# Patient Record
Sex: FEMALE | Race: WHITE | Employment: OTHER | URBAN - METROPOLITAN AREA
[De-identification: names, ages, dates, MRNs, and addresses within clinical notes are randomized per-mention and may not be internally consistent; named-entity substitution may affect disease eponyms.]

---

## 2017-01-09 ENCOUNTER — TELEPHONE (OUTPATIENT)
Dept: INTERNAL MEDICINE CLINIC | Age: 82
End: 2017-01-09

## 2017-01-09 ENCOUNTER — OFFICE VISIT (OUTPATIENT)
Dept: INTERNAL MEDICINE CLINIC | Age: 82
End: 2017-01-09

## 2017-01-09 ENCOUNTER — HOSPITAL ENCOUNTER (OUTPATIENT)
Dept: GENERAL RADIOLOGY | Age: 82
Discharge: HOME | End: 2017-01-09
Attending: FAMILY MEDICINE | Admitting: FAMILY MEDICINE

## 2017-01-09 VITALS
HEART RATE: 83 BPM | SYSTOLIC BLOOD PRESSURE: 112 MMHG | DIASTOLIC BLOOD PRESSURE: 64 MMHG | OXYGEN SATURATION: 96 % | WEIGHT: 154 LBS | BODY MASS INDEX: 24.75 KG/M2 | HEIGHT: 66 IN

## 2017-01-09 DIAGNOSIS — F51.04 PSYCHOPHYSIOLOGICAL INSOMNIA: ICD-10-CM

## 2017-01-09 DIAGNOSIS — E78.2 MIXED HYPERLIPIDEMIA: ICD-10-CM

## 2017-01-09 DIAGNOSIS — R53.83 FATIGUE, UNSPECIFIED TYPE: ICD-10-CM

## 2017-01-09 DIAGNOSIS — Z76.89 ENCOUNTER TO ESTABLISH CARE: ICD-10-CM

## 2017-01-09 DIAGNOSIS — I10 ESSENTIAL HYPERTENSION: ICD-10-CM

## 2017-01-09 DIAGNOSIS — E11.9 TYPE 2 DIABETES MELLITUS WITHOUT COMPLICATION, WITHOUT LONG-TERM CURRENT USE OF INSULIN (HCC): ICD-10-CM

## 2017-01-09 DIAGNOSIS — E11.9 TYPE 2 DIABETES MELLITUS WITHOUT COMPLICATION, WITHOUT LONG-TERM CURRENT USE OF INSULIN (HCC): Primary | ICD-10-CM

## 2017-01-09 DIAGNOSIS — R35.0 URINARY FREQUENCY: ICD-10-CM

## 2017-01-09 LAB
A/G RATIO: 1 (ref 1.1–2.2)
ALBUMIN SERPL-MCNC: 4.1 G/DL (ref 3.4–5)
ALP BLD-CCNC: 111 U/L (ref 40–129)
ALT SERPL-CCNC: 37 U/L (ref 10–40)
ANION GAP SERPL CALCULATED.3IONS-SCNC: 15 MMOL/L (ref 3–16)
AST SERPL-CCNC: 35 U/L (ref 15–37)
BILIRUB SERPL-MCNC: 0.7 MG/DL (ref 0–1)
BILIRUBIN, POC: NORMAL
BLOOD URINE, POC: NORMAL
BUN BLDV-MCNC: 29 MG/DL (ref 7–20)
CALCIUM SERPL-MCNC: 9.6 MG/DL (ref 8.3–10.6)
CHLORIDE BLD-SCNC: 98 MMOL/L (ref 99–110)
CHOLESTEROL, TOTAL: 193 MG/DL (ref 0–199)
CLARITY, POC: CLEAR
CO2: 24 MMOL/L (ref 21–32)
COLOR, POC: YELLOW
CREAT SERPL-MCNC: 1.1 MG/DL (ref 0.6–1.2)
GFR AFRICAN AMERICAN: 58
GFR NON-AFRICAN AMERICAN: 48
GLOBULIN: 4 G/DL
GLUCOSE BLD-MCNC: 164 MG/DL (ref 70–99)
GLUCOSE URINE, POC: NORMAL
HDLC SERPL-MCNC: 51 MG/DL (ref 40–60)
KETONES, POC: NORMAL
LDL CHOLESTEROL CALCULATED: 86 MG/DL
LEUKOCYTE EST, POC: NORMAL
NITRITE, POC: NORMAL
PH, POC: 5
POTASSIUM SERPL-SCNC: 5.1 MMOL/L (ref 3.5–5.1)
PROTEIN, POC: NORMAL
SODIUM BLD-SCNC: 137 MMOL/L (ref 136–145)
SPECIFIC GRAVITY, POC: 1.05
TOTAL PROTEIN: 8.1 G/DL (ref 6.4–8.2)
TRIGL SERPL-MCNC: 282 MG/DL (ref 0–150)
TSH REFLEX: 3.41 UIU/ML (ref 0.27–4.2)
UROBILINOGEN, POC: 0.2
VLDLC SERPL CALC-MCNC: 56 MG/DL

## 2017-01-09 PROCEDURE — 81002 URINALYSIS NONAUTO W/O SCOPE: CPT | Performed by: FAMILY MEDICINE

## 2017-01-09 PROCEDURE — 99215 OFFICE O/P EST HI 40 MIN: CPT | Performed by: FAMILY MEDICINE

## 2017-01-09 RX ORDER — NITROFURANTOIN 25; 75 MG/1; MG/1
100 CAPSULE ORAL 2 TIMES DAILY
Qty: 14 CAPSULE | Refills: 0 | Status: SHIPPED | OUTPATIENT
Start: 2017-01-09 | End: 2017-01-16

## 2017-01-09 RX ORDER — LISINOPRIL 20 MG/1
TABLET ORAL
Qty: 180 TABLET | Refills: 3 | Status: SHIPPED | OUTPATIENT
Start: 2017-01-09 | End: 2018-01-23 | Stop reason: SDUPTHER

## 2017-01-09 RX ORDER — CHOLECALCIFEROL (VITAMIN D3) 125 MCG
5 CAPSULE ORAL NIGHTLY
Qty: 30 TABLET | Refills: 3 | Status: SHIPPED | OUTPATIENT
Start: 2017-01-09 | End: 2017-02-14 | Stop reason: SDUPTHER

## 2017-01-09 ASSESSMENT — PATIENT HEALTH QUESTIONNAIRE - PHQ9
SUM OF ALL RESPONSES TO PHQ9 QUESTIONS 1 & 2: 0
SUM OF ALL RESPONSES TO PHQ QUESTIONS 1-9: 0
2. FEELING DOWN, DEPRESSED OR HOPELESS: 0
1. LITTLE INTEREST OR PLEASURE IN DOING THINGS: 0

## 2017-01-10 LAB
ESTIMATED AVERAGE GLUCOSE: 174.3 MG/DL
HBA1C MFR BLD: 7.7 %

## 2017-01-10 ASSESSMENT — ENCOUNTER SYMPTOMS
NAUSEA: 0
ABDOMINAL PAIN: 0
SHORTNESS OF BREATH: 0
CONSTIPATION: 0
WHEEZING: 0
BACK PAIN: 0
VOMITING: 0
SORE THROAT: 0
COUGH: 0
RHINORRHEA: 0
TROUBLE SWALLOWING: 0
CHOKING: 0
DIARRHEA: 0

## 2017-01-11 LAB — URINE CULTURE, ROUTINE: NORMAL

## 2017-01-12 ENCOUNTER — TELEPHONE (OUTPATIENT)
Dept: INTERNAL MEDICINE CLINIC | Age: 82
End: 2017-01-12

## 2017-01-24 ENCOUNTER — OFFICE VISIT (OUTPATIENT)
Dept: INTERNAL MEDICINE CLINIC | Age: 82
End: 2017-01-24

## 2017-01-24 VITALS
OXYGEN SATURATION: 96 % | TEMPERATURE: 98.1 F | BODY MASS INDEX: 25.18 KG/M2 | DIASTOLIC BLOOD PRESSURE: 68 MMHG | WEIGHT: 156 LBS | SYSTOLIC BLOOD PRESSURE: 162 MMHG | HEART RATE: 86 BPM

## 2017-01-24 DIAGNOSIS — R35.0 FREQUENCY OF URINATION: ICD-10-CM

## 2017-01-24 DIAGNOSIS — N32.81 OAB (OVERACTIVE BLADDER): ICD-10-CM

## 2017-01-24 DIAGNOSIS — I10 ESSENTIAL HYPERTENSION: Primary | ICD-10-CM

## 2017-01-24 LAB
BILIRUBIN, POC: ABNORMAL
BLOOD URINE, POC: ABNORMAL
CLARITY, POC: CLEAR
COLOR, POC: YELLOW
GLUCOSE URINE, POC: ABNORMAL
KETONES, POC: ABNORMAL
LEUKOCYTE EST, POC: ABNORMAL
NITRITE, POC: ABNORMAL
PH, POC: 6
PROTEIN, POC: ABNORMAL
SPECIFIC GRAVITY, POC: 1.02
UROBILINOGEN, POC: 0.2

## 2017-01-24 PROCEDURE — 1090F PRES/ABSN URINE INCON ASSESS: CPT | Performed by: FAMILY MEDICINE

## 2017-01-24 PROCEDURE — G8427 DOCREV CUR MEDS BY ELIG CLIN: HCPCS | Performed by: FAMILY MEDICINE

## 2017-01-24 PROCEDURE — 1036F TOBACCO NON-USER: CPT | Performed by: FAMILY MEDICINE

## 2017-01-24 PROCEDURE — 99213 OFFICE O/P EST LOW 20 MIN: CPT | Performed by: FAMILY MEDICINE

## 2017-01-24 PROCEDURE — 4040F PNEUMOC VAC/ADMIN/RCVD: CPT | Performed by: FAMILY MEDICINE

## 2017-01-24 PROCEDURE — 81002 URINALYSIS NONAUTO W/O SCOPE: CPT | Performed by: FAMILY MEDICINE

## 2017-01-24 PROCEDURE — G8400 PT W/DXA NO RESULTS DOC: HCPCS | Performed by: FAMILY MEDICINE

## 2017-01-24 PROCEDURE — 1123F ACP DISCUSS/DSCN MKR DOCD: CPT | Performed by: FAMILY MEDICINE

## 2017-01-24 PROCEDURE — G8420 CALC BMI NORM PARAMETERS: HCPCS | Performed by: FAMILY MEDICINE

## 2017-01-24 PROCEDURE — G8484 FLU IMMUNIZE NO ADMIN: HCPCS | Performed by: FAMILY MEDICINE

## 2017-01-24 RX ORDER — AMLODIPINE BESYLATE 5 MG/1
5 TABLET ORAL DAILY
Qty: 90 TABLET | Refills: 0 | Status: SHIPPED | OUTPATIENT
Start: 2017-01-24 | End: 2017-04-27 | Stop reason: SDUPTHER

## 2017-01-24 RX ORDER — OXYBUTYNIN CHLORIDE 5 MG/1
TABLET ORAL
Qty: 90 TABLET | Refills: 3 | Status: SHIPPED | OUTPATIENT
Start: 2017-01-24 | End: 2018-01-03 | Stop reason: SDUPTHER

## 2017-01-26 LAB
ORGANISM: ABNORMAL
URINE CULTURE, ROUTINE: ABNORMAL

## 2017-01-26 RX ORDER — CLINDAMYCIN HYDROCHLORIDE 300 MG/1
300 CAPSULE ORAL 3 TIMES DAILY
Qty: 30 CAPSULE | Refills: 0 | Status: SHIPPED | OUTPATIENT
Start: 2017-01-26 | End: 2017-02-05

## 2017-02-14 ENCOUNTER — OFFICE VISIT (OUTPATIENT)
Dept: INTERNAL MEDICINE CLINIC | Age: 82
End: 2017-02-14

## 2017-02-14 VITALS
DIASTOLIC BLOOD PRESSURE: 72 MMHG | WEIGHT: 159 LBS | OXYGEN SATURATION: 98 % | SYSTOLIC BLOOD PRESSURE: 128 MMHG | HEART RATE: 92 BPM | BODY MASS INDEX: 25.66 KG/M2

## 2017-02-14 DIAGNOSIS — F51.04 PSYCHOPHYSIOLOGICAL INSOMNIA: ICD-10-CM

## 2017-02-14 DIAGNOSIS — I10 ESSENTIAL HYPERTENSION: Primary | ICD-10-CM

## 2017-02-14 DIAGNOSIS — R35.0 URINARY FREQUENCY: ICD-10-CM

## 2017-02-14 DIAGNOSIS — K52.1 ANTIBIOTIC-ASSOCIATED DIARRHEA: ICD-10-CM

## 2017-02-14 DIAGNOSIS — E11.9 TYPE 2 DIABETES MELLITUS WITHOUT COMPLICATION, WITHOUT LONG-TERM CURRENT USE OF INSULIN (HCC): ICD-10-CM

## 2017-02-14 DIAGNOSIS — T36.95XA ANTIBIOTIC-ASSOCIATED DIARRHEA: ICD-10-CM

## 2017-02-14 PROCEDURE — 1123F ACP DISCUSS/DSCN MKR DOCD: CPT | Performed by: FAMILY MEDICINE

## 2017-02-14 PROCEDURE — G8400 PT W/DXA NO RESULTS DOC: HCPCS | Performed by: FAMILY MEDICINE

## 2017-02-14 PROCEDURE — 1090F PRES/ABSN URINE INCON ASSESS: CPT | Performed by: FAMILY MEDICINE

## 2017-02-14 PROCEDURE — G8484 FLU IMMUNIZE NO ADMIN: HCPCS | Performed by: FAMILY MEDICINE

## 2017-02-14 PROCEDURE — 4040F PNEUMOC VAC/ADMIN/RCVD: CPT | Performed by: FAMILY MEDICINE

## 2017-02-14 PROCEDURE — G8427 DOCREV CUR MEDS BY ELIG CLIN: HCPCS | Performed by: FAMILY MEDICINE

## 2017-02-14 PROCEDURE — G8420 CALC BMI NORM PARAMETERS: HCPCS | Performed by: FAMILY MEDICINE

## 2017-02-14 PROCEDURE — 99214 OFFICE O/P EST MOD 30 MIN: CPT | Performed by: FAMILY MEDICINE

## 2017-02-14 PROCEDURE — 1036F TOBACCO NON-USER: CPT | Performed by: FAMILY MEDICINE

## 2017-02-14 RX ORDER — CHOLECALCIFEROL (VITAMIN D3) 125 MCG
10 CAPSULE ORAL NIGHTLY
Qty: 60 TABLET | Refills: 5 | Status: SHIPPED | OUTPATIENT
Start: 2017-02-14 | End: 2018-02-28

## 2017-02-16 ENCOUNTER — OFFICE VISIT (OUTPATIENT)
Dept: INTERNAL MEDICINE CLINIC | Age: 82
End: 2017-02-16

## 2017-02-16 DIAGNOSIS — R35.0 URINARY FREQUENCY: Primary | ICD-10-CM

## 2017-02-16 LAB
BILIRUBIN, POC: NORMAL
BLOOD URINE, POC: NORMAL
CLARITY, POC: CLEAR
COLOR, POC: YELLOW
GLUCOSE URINE, POC: NORMAL
KETONES, POC: NORMAL
LEUKOCYTE EST, POC: NORMAL
NITRITE, POC: NORMAL
PH, POC: 5
PROTEIN, POC: NORMAL
SPECIFIC GRAVITY, POC: 1.01
UROBILINOGEN, POC: 0.2

## 2017-02-16 PROCEDURE — 81002 URINALYSIS NONAUTO W/O SCOPE: CPT | Performed by: FAMILY MEDICINE

## 2017-02-16 PROCEDURE — 1036F TOBACCO NON-USER: CPT | Performed by: FAMILY MEDICINE

## 2017-02-16 PROCEDURE — 99999 PR OFFICE/OUTPT VISIT,PROCEDURE ONLY: CPT | Performed by: FAMILY MEDICINE

## 2017-02-18 LAB — URINE CULTURE, ROUTINE: NORMAL

## 2017-02-26 ASSESSMENT — ENCOUNTER SYMPTOMS
DIARRHEA: 1
BACK PAIN: 0
SHORTNESS OF BREATH: 0
SORE THROAT: 0
CONSTIPATION: 0
TROUBLE SWALLOWING: 0
RHINORRHEA: 0
CHOKING: 0
ABDOMINAL PAIN: 0
WHEEZING: 0
BLOOD IN STOOL: 0
NAUSEA: 0
VOMITING: 0
COUGH: 0

## 2017-03-09 ENCOUNTER — OFFICE VISIT (OUTPATIENT)
Dept: INTERNAL MEDICINE CLINIC | Age: 82
End: 2017-03-09

## 2017-03-09 VITALS
DIASTOLIC BLOOD PRESSURE: 68 MMHG | HEART RATE: 103 BPM | WEIGHT: 156 LBS | SYSTOLIC BLOOD PRESSURE: 110 MMHG | OXYGEN SATURATION: 96 % | BODY MASS INDEX: 25.18 KG/M2

## 2017-03-09 DIAGNOSIS — E11.9 TYPE 2 DIABETES MELLITUS WITHOUT COMPLICATION, WITHOUT LONG-TERM CURRENT USE OF INSULIN (HCC): ICD-10-CM

## 2017-03-09 DIAGNOSIS — G47.9 SLEEPING DIFFICULTIES: Primary | ICD-10-CM

## 2017-03-09 DIAGNOSIS — R06.83 SNORING: ICD-10-CM

## 2017-03-09 PROCEDURE — G8420 CALC BMI NORM PARAMETERS: HCPCS | Performed by: FAMILY MEDICINE

## 2017-03-09 PROCEDURE — G8427 DOCREV CUR MEDS BY ELIG CLIN: HCPCS | Performed by: FAMILY MEDICINE

## 2017-03-09 PROCEDURE — 1036F TOBACCO NON-USER: CPT | Performed by: FAMILY MEDICINE

## 2017-03-09 PROCEDURE — 4040F PNEUMOC VAC/ADMIN/RCVD: CPT | Performed by: FAMILY MEDICINE

## 2017-03-09 PROCEDURE — 1123F ACP DISCUSS/DSCN MKR DOCD: CPT | Performed by: FAMILY MEDICINE

## 2017-03-09 PROCEDURE — G8400 PT W/DXA NO RESULTS DOC: HCPCS | Performed by: FAMILY MEDICINE

## 2017-03-09 PROCEDURE — 1090F PRES/ABSN URINE INCON ASSESS: CPT | Performed by: FAMILY MEDICINE

## 2017-03-09 PROCEDURE — 99214 OFFICE O/P EST MOD 30 MIN: CPT | Performed by: FAMILY MEDICINE

## 2017-03-09 PROCEDURE — G8484 FLU IMMUNIZE NO ADMIN: HCPCS | Performed by: FAMILY MEDICINE

## 2017-04-17 ENCOUNTER — HOSPITAL ENCOUNTER (OUTPATIENT)
Dept: GENERAL RADIOLOGY | Age: 82
Discharge: HOME | End: 2017-04-17
Attending: FAMILY MEDICINE | Admitting: FAMILY MEDICINE

## 2017-04-17 ENCOUNTER — OFFICE VISIT (OUTPATIENT)
Dept: INTERNAL MEDICINE CLINIC | Age: 82
End: 2017-04-17

## 2017-04-17 VITALS
WEIGHT: 156 LBS | DIASTOLIC BLOOD PRESSURE: 56 MMHG | HEART RATE: 88 BPM | SYSTOLIC BLOOD PRESSURE: 118 MMHG | BODY MASS INDEX: 25.18 KG/M2 | OXYGEN SATURATION: 98 %

## 2017-04-17 DIAGNOSIS — E78.2 MIXED HYPERLIPIDEMIA: ICD-10-CM

## 2017-04-17 DIAGNOSIS — G47.9 SLEEPING DIFFICULTIES: ICD-10-CM

## 2017-04-17 DIAGNOSIS — I10 ESSENTIAL HYPERTENSION: ICD-10-CM

## 2017-04-17 DIAGNOSIS — E11.9 TYPE 2 DIABETES MELLITUS WITHOUT COMPLICATION, WITHOUT LONG-TERM CURRENT USE OF INSULIN (HCC): Primary | ICD-10-CM

## 2017-04-17 DIAGNOSIS — J30.2 SEASONAL ALLERGIC RHINITIS, UNSPECIFIED ALLERGIC RHINITIS TRIGGER: ICD-10-CM

## 2017-04-17 DIAGNOSIS — R01.1 HEART MURMUR, SYSTOLIC: ICD-10-CM

## 2017-04-17 DIAGNOSIS — E11.9 TYPE 2 DIABETES MELLITUS WITHOUT COMPLICATION, WITHOUT LONG-TERM CURRENT USE OF INSULIN (HCC): ICD-10-CM

## 2017-04-17 DIAGNOSIS — R68.89 DECREASED EXERCISE TOLERANCE: ICD-10-CM

## 2017-04-17 LAB
A/G RATIO: 1.1 (ref 1.1–2.2)
ALBUMIN SERPL-MCNC: 3.9 G/DL (ref 3.4–5)
ALP BLD-CCNC: 96 U/L (ref 40–129)
ALT SERPL-CCNC: 36 U/L (ref 10–40)
ANION GAP SERPL CALCULATED.3IONS-SCNC: 19 MMOL/L (ref 3–16)
AST SERPL-CCNC: 34 U/L (ref 15–37)
BILIRUB SERPL-MCNC: 0.7 MG/DL (ref 0–1)
BUN BLDV-MCNC: 21 MG/DL (ref 7–20)
CALCIUM SERPL-MCNC: 9.4 MG/DL (ref 8.3–10.6)
CHLORIDE BLD-SCNC: 103 MMOL/L (ref 99–110)
CO2: 22 MMOL/L (ref 21–32)
CREAT SERPL-MCNC: 0.9 MG/DL (ref 0.6–1.2)
GFR AFRICAN AMERICAN: >60
GFR NON-AFRICAN AMERICAN: >60
GLOBULIN: 3.6 G/DL
GLUCOSE BLD-MCNC: 246 MG/DL (ref 70–99)
POTASSIUM SERPL-SCNC: 4.8 MMOL/L (ref 3.5–5.1)
SODIUM BLD-SCNC: 144 MMOL/L (ref 136–145)
TOTAL PROTEIN: 7.5 G/DL (ref 6.4–8.2)

## 2017-04-17 PROCEDURE — G8427 DOCREV CUR MEDS BY ELIG CLIN: HCPCS | Performed by: FAMILY MEDICINE

## 2017-04-17 PROCEDURE — 1036F TOBACCO NON-USER: CPT | Performed by: FAMILY MEDICINE

## 2017-04-17 PROCEDURE — G8400 PT W/DXA NO RESULTS DOC: HCPCS | Performed by: FAMILY MEDICINE

## 2017-04-17 PROCEDURE — 4040F PNEUMOC VAC/ADMIN/RCVD: CPT | Performed by: FAMILY MEDICINE

## 2017-04-17 PROCEDURE — G8420 CALC BMI NORM PARAMETERS: HCPCS | Performed by: FAMILY MEDICINE

## 2017-04-17 PROCEDURE — 99214 OFFICE O/P EST MOD 30 MIN: CPT | Performed by: FAMILY MEDICINE

## 2017-04-17 PROCEDURE — 1090F PRES/ABSN URINE INCON ASSESS: CPT | Performed by: FAMILY MEDICINE

## 2017-04-17 PROCEDURE — 1123F ACP DISCUSS/DSCN MKR DOCD: CPT | Performed by: FAMILY MEDICINE

## 2017-04-17 RX ORDER — LORATADINE 10 MG/1
10 TABLET ORAL DAILY
Qty: 30 TABLET | Refills: 1 | Status: SHIPPED | OUTPATIENT
Start: 2017-04-17 | End: 2017-06-15

## 2017-04-18 LAB
ESTIMATED AVERAGE GLUCOSE: 191.5 MG/DL
HBA1C MFR BLD: 8.3 %

## 2017-04-18 ASSESSMENT — ENCOUNTER SYMPTOMS
NAUSEA: 0
BACK PAIN: 0
CONSTIPATION: 0
TROUBLE SWALLOWING: 0
VOMITING: 0
WHEEZING: 0
CHOKING: 0
DIARRHEA: 0
RHINORRHEA: 0
SORE THROAT: 0
ABDOMINAL PAIN: 0
COUGH: 0
SHORTNESS OF BREATH: 0

## 2017-04-19 ENCOUNTER — TELEPHONE (OUTPATIENT)
Dept: INTERNAL MEDICINE CLINIC | Age: 82
End: 2017-04-19

## 2017-04-26 ENCOUNTER — TELEPHONE (OUTPATIENT)
Dept: INTERNAL MEDICINE CLINIC | Age: 82
End: 2017-04-26

## 2017-04-27 RX ORDER — GLIMEPIRIDE 4 MG/1
TABLET ORAL
Qty: 90 TABLET | Refills: 3 | Status: SHIPPED | OUTPATIENT
Start: 2017-04-27 | End: 2018-05-15 | Stop reason: SDUPTHER

## 2017-05-01 ENCOUNTER — HOSPITAL ENCOUNTER (OUTPATIENT)
Dept: NON INVASIVE DIAGNOSTICS | Age: 82
Discharge: HOME | End: 2017-05-01
Attending: FAMILY MEDICINE | Admitting: FAMILY MEDICINE

## 2017-05-01 DIAGNOSIS — R01.1 CARDIAC MURMUR: ICD-10-CM

## 2017-05-01 LAB
LV EF: 55 %
LVEF MODALITY: NORMAL

## 2017-06-15 ENCOUNTER — OFFICE VISIT (OUTPATIENT)
Dept: INTERNAL MEDICINE CLINIC | Age: 82
End: 2017-06-15

## 2017-06-15 VITALS
DIASTOLIC BLOOD PRESSURE: 64 MMHG | WEIGHT: 156 LBS | SYSTOLIC BLOOD PRESSURE: 150 MMHG | HEART RATE: 67 BPM | BODY MASS INDEX: 25.18 KG/M2 | OXYGEN SATURATION: 97 %

## 2017-06-15 DIAGNOSIS — G47.9 SLEEPING DIFFICULTIES: ICD-10-CM

## 2017-06-15 DIAGNOSIS — E11.9 TYPE 2 DIABETES MELLITUS WITHOUT COMPLICATION, WITHOUT LONG-TERM CURRENT USE OF INSULIN (HCC): Primary | ICD-10-CM

## 2017-06-15 DIAGNOSIS — E78.2 MIXED HYPERLIPIDEMIA: ICD-10-CM

## 2017-06-15 DIAGNOSIS — I10 ESSENTIAL HYPERTENSION: ICD-10-CM

## 2017-06-15 LAB — HBA1C MFR BLD: 8.6 %

## 2017-06-15 PROCEDURE — G8400 PT W/DXA NO RESULTS DOC: HCPCS | Performed by: FAMILY MEDICINE

## 2017-06-15 PROCEDURE — 4040F PNEUMOC VAC/ADMIN/RCVD: CPT | Performed by: FAMILY MEDICINE

## 2017-06-15 PROCEDURE — 1123F ACP DISCUSS/DSCN MKR DOCD: CPT | Performed by: FAMILY MEDICINE

## 2017-06-15 PROCEDURE — 83036 HEMOGLOBIN GLYCOSYLATED A1C: CPT | Performed by: FAMILY MEDICINE

## 2017-06-15 PROCEDURE — 99214 OFFICE O/P EST MOD 30 MIN: CPT | Performed by: FAMILY MEDICINE

## 2017-06-15 PROCEDURE — 1090F PRES/ABSN URINE INCON ASSESS: CPT | Performed by: FAMILY MEDICINE

## 2017-06-15 PROCEDURE — 1036F TOBACCO NON-USER: CPT | Performed by: FAMILY MEDICINE

## 2017-06-15 PROCEDURE — G8419 CALC BMI OUT NRM PARAM NOF/U: HCPCS | Performed by: FAMILY MEDICINE

## 2017-06-15 PROCEDURE — G8427 DOCREV CUR MEDS BY ELIG CLIN: HCPCS | Performed by: FAMILY MEDICINE

## 2017-06-15 ASSESSMENT — ENCOUNTER SYMPTOMS
WHEEZING: 0
VOMITING: 0
BACK PAIN: 0
NAUSEA: 0
CONSTIPATION: 0
CHOKING: 0
TROUBLE SWALLOWING: 0
SORE THROAT: 0
DIARRHEA: 0
ABDOMINAL PAIN: 0
RHINORRHEA: 0
COUGH: 0

## 2017-06-20 ENCOUNTER — TELEPHONE (OUTPATIENT)
Dept: INTERNAL MEDICINE CLINIC | Age: 82
End: 2017-06-20

## 2017-06-26 ENCOUNTER — TELEPHONE (OUTPATIENT)
Dept: INTERNAL MEDICINE CLINIC | Age: 82
End: 2017-06-26

## 2017-06-26 RX ORDER — LANCETS
1 EACH MISCELLANEOUS DAILY
Qty: 100 EACH | Refills: 3 | Status: ON HOLD | OUTPATIENT
Start: 2017-06-26 | End: 2019-08-24 | Stop reason: HOSPADM

## 2017-07-18 ENCOUNTER — TELEPHONE (OUTPATIENT)
Dept: INTERNAL MEDICINE CLINIC | Age: 82
End: 2017-07-18

## 2017-07-26 ENCOUNTER — OFFICE VISIT (OUTPATIENT)
Dept: INTERNAL MEDICINE CLINIC | Age: 82
End: 2017-07-26

## 2017-07-26 VITALS
TEMPERATURE: 98.2 F | BODY MASS INDEX: 25.02 KG/M2 | SYSTOLIC BLOOD PRESSURE: 136 MMHG | HEART RATE: 88 BPM | OXYGEN SATURATION: 97 % | WEIGHT: 155 LBS | DIASTOLIC BLOOD PRESSURE: 60 MMHG

## 2017-07-26 DIAGNOSIS — J30.2 SEASONAL ALLERGIC RHINITIS, UNSPECIFIED ALLERGIC RHINITIS TRIGGER: Primary | ICD-10-CM

## 2017-07-26 DIAGNOSIS — M19.90 ARTHRITIS: ICD-10-CM

## 2017-07-26 DIAGNOSIS — R05.9 COUGH: ICD-10-CM

## 2017-07-26 PROCEDURE — 4040F PNEUMOC VAC/ADMIN/RCVD: CPT | Performed by: FAMILY MEDICINE

## 2017-07-26 PROCEDURE — 1090F PRES/ABSN URINE INCON ASSESS: CPT | Performed by: FAMILY MEDICINE

## 2017-07-26 PROCEDURE — 1123F ACP DISCUSS/DSCN MKR DOCD: CPT | Performed by: FAMILY MEDICINE

## 2017-07-26 PROCEDURE — G8419 CALC BMI OUT NRM PARAM NOF/U: HCPCS | Performed by: FAMILY MEDICINE

## 2017-07-26 PROCEDURE — 1036F TOBACCO NON-USER: CPT | Performed by: FAMILY MEDICINE

## 2017-07-26 PROCEDURE — G8400 PT W/DXA NO RESULTS DOC: HCPCS | Performed by: FAMILY MEDICINE

## 2017-07-26 PROCEDURE — G8427 DOCREV CUR MEDS BY ELIG CLIN: HCPCS | Performed by: FAMILY MEDICINE

## 2017-07-26 PROCEDURE — 99214 OFFICE O/P EST MOD 30 MIN: CPT | Performed by: FAMILY MEDICINE

## 2017-07-26 RX ORDER — LORATADINE 10 MG/1
10 CAPSULE, LIQUID FILLED ORAL DAILY
Qty: 30 CAPSULE | Refills: 2 | Status: SHIPPED | OUTPATIENT
Start: 2017-07-26 | End: 2018-02-28

## 2017-07-26 RX ORDER — BENZONATATE 100 MG/1
100-200 CAPSULE ORAL 3 TIMES DAILY PRN
Qty: 25 CAPSULE | Refills: 0 | Status: SHIPPED | OUTPATIENT
Start: 2017-07-26 | End: 2017-08-02

## 2017-07-26 ASSESSMENT — ENCOUNTER SYMPTOMS
CHEST TIGHTNESS: 0
RHINORRHEA: 1
VOMITING: 0
NAUSEA: 0
EYE DISCHARGE: 0
DIARRHEA: 0
COUGH: 1
SINUS PRESSURE: 0
SORE THROAT: 0

## 2017-07-28 ENCOUNTER — TELEPHONE (OUTPATIENT)
Dept: INTERNAL MEDICINE CLINIC | Age: 82
End: 2017-07-28

## 2017-09-18 ENCOUNTER — TELEPHONE (OUTPATIENT)
Dept: INTERNAL MEDICINE CLINIC | Age: 82
End: 2017-09-18

## 2017-09-18 RX ORDER — ATORVASTATIN CALCIUM 40 MG/1
40 TABLET, FILM COATED ORAL DAILY
Qty: 90 TABLET | Refills: 3 | Status: SHIPPED | OUTPATIENT
Start: 2017-09-18 | End: 2018-11-11 | Stop reason: SDUPTHER

## 2017-10-04 ENCOUNTER — OFFICE VISIT (OUTPATIENT)
Dept: ORTHOPEDIC SURGERY | Age: 82
End: 2017-10-04

## 2017-10-04 VITALS
WEIGHT: 156 LBS | DIASTOLIC BLOOD PRESSURE: 76 MMHG | SYSTOLIC BLOOD PRESSURE: 129 MMHG | BODY MASS INDEX: 25.07 KG/M2 | HEART RATE: 62 BPM | HEIGHT: 66 IN

## 2017-10-04 DIAGNOSIS — M25.511 RIGHT SHOULDER PAIN, UNSPECIFIED CHRONICITY: Primary | ICD-10-CM

## 2017-10-04 PROCEDURE — 99203 OFFICE O/P NEW LOW 30 MIN: CPT | Performed by: ORTHOPAEDIC SURGERY

## 2017-10-04 PROCEDURE — 20610 DRAIN/INJ JOINT/BURSA W/O US: CPT | Performed by: ORTHOPAEDIC SURGERY

## 2017-10-04 PROCEDURE — G8484 FLU IMMUNIZE NO ADMIN: HCPCS | Performed by: ORTHOPAEDIC SURGERY

## 2017-10-04 PROCEDURE — G8417 CALC BMI ABV UP PARAM F/U: HCPCS | Performed by: ORTHOPAEDIC SURGERY

## 2017-10-04 PROCEDURE — G8400 PT W/DXA NO RESULTS DOC: HCPCS | Performed by: ORTHOPAEDIC SURGERY

## 2017-10-04 PROCEDURE — G8427 DOCREV CUR MEDS BY ELIG CLIN: HCPCS | Performed by: ORTHOPAEDIC SURGERY

## 2017-10-04 PROCEDURE — 73030 X-RAY EXAM OF SHOULDER: CPT | Performed by: ORTHOPAEDIC SURGERY

## 2017-10-04 PROCEDURE — 1123F ACP DISCUSS/DSCN MKR DOCD: CPT | Performed by: ORTHOPAEDIC SURGERY

## 2017-10-04 PROCEDURE — 1090F PRES/ABSN URINE INCON ASSESS: CPT | Performed by: ORTHOPAEDIC SURGERY

## 2017-10-04 PROCEDURE — 1036F TOBACCO NON-USER: CPT | Performed by: ORTHOPAEDIC SURGERY

## 2017-10-04 PROCEDURE — 4040F PNEUMOC VAC/ADMIN/RCVD: CPT | Performed by: ORTHOPAEDIC SURGERY

## 2017-10-04 RX ORDER — MELOXICAM 15 MG/1
15 TABLET ORAL DAILY
Qty: 30 TABLET | Refills: 3 | Status: SHIPPED | OUTPATIENT
Start: 2017-10-04 | End: 2018-02-28

## 2017-10-04 NOTE — PROGRESS NOTES
Chief Complaint:  Shoulder Pain (RIGHT)      History of Present Illness:  Delisa Briceno is a [de-identified] y.o. female here regarding right shoulder pain. She also has bilateral shoulder pain and left knee pain. But it'll her right shoulder is worse. She has a family history of relatively severe degenerative osteoarthritis. Currently not undergoing any arthritic treatment or therapy. Today she presents with right shoulder pain. He has trouble with range of motion feels that she has limited range of motion as well as weakness. The pain is located diffusely throughout the shoulder. She describes the symptoms as aching and sharp. Symptoms improve with rest. She has trouble sleeping at night on the right shoulder. Medical History:  Patient's medications, allergies, past medical, surgical, social and family histories were reviewed and updated as appropriate. Review of Systems:  Pertinent items are noted in HPI  Review of systems reviewed from Patient History Form dated on 10/4/17 and available in the patient's chart under the Media tab. Vital Signs:  Vitals:    10/04/17 1331   BP: 129/76   Pulse: 62       Pain Assessment:  Pain Assessment  Location of Pain: Shoulder  Location Modifiers: Right  Severity of Pain: 1  Quality of Pain: Dull, Aching  Frequency of Pain: Intermittent  Limiting Behavior: No  Relieving Factors: Nsaids  Result of Injury: No  Work-Related Injury: No  Are there other pain locations you wish to document?: No         General Exam:   Constitutional: Patient is adequately groomed with no evidence of malnutrition  Mental Status: The patient is oriented to time, place and person. The patient's mood and affect are appropriate. Vascular: Examination reveals no swelling or calf tenderness. Peripheral pulses are palpable and 2+. Neurological: The patient has good coordination. There is no weakness or sensory deficit. Shoulder Examination:  Inspection:  No gross deformities noted.  No atrophy, erythema or ecchymosis. Skin is intact. Palpation:  She has some mild tenderness to palpation around the right shoulder girdle but not well localized. Range of Motion:  Her range of motion both active and passive are limited secondary to her degenerative changes. Flexion is about 120° her abduction to about 70  Ro 40 internal rotates to her hip pocket. Strength:  She has good rotator cuff strength no evidence of a rotator cuff deficit. Special Tests:  Her shoulder is stable    Skin: There are no rashes, ulcerations or lesions. Additional Comments: Sensation is intact to light touch throughout the median, ulnar and radial nerve distribution. Able to wiggle fingers, give thumbs up, A-OK and cross index and middle fingers. Additional Examinations:  Left Upper Extremity: Examination of the left upper extremity does not show any tenderness, deformity or injury. Range of motion is unremarkable. There is no gross instability. There are no rashes, ulcerations or lesions. Strength and tone are normal.    XR SHOULDER RIGHT STANDARD  NE ARTHROCENTESIS ASPIR&/INJ MAJOR JT/BURSA W/O US  NE METHYLPREDNISOLONE 40 MG INJ  Diagnostic Test Findings:    4 views of the right shoulder show moderately severe degenerative osteoarthritis of the right glenohumeral joint      Assessment:  Right shoulder arthritis    Impression:  Encounter Diagnosis   Name Primary?  Right shoulder pain, unspecified chronicity Yes       Office Procedures:  Orders Placed This Encounter   Procedures    XR SHOULDER RIGHT (MIN 2 VIEWS)     97788     Order Specific Question:   Reason for exam:     Answer:   Pain    NE ARTHROCENTESIS ASPIR&/INJ MAJOR JT/BURSA W/O US    NE METHYLPREDNISOLONE 40 MG INJ       Treatment Plan:  She will be placed on meloxicam 15 mg a day for at least the next 2 months. She is instructed to take that with food contact us if she has any problems.   Today we also injected her glenohumeral joint with Depo-Medrol and Marcaine which she tolerated well. She'll follow up as needed. The risks and benefits of an injection were discussed with the patient. The patient had full opportunity to ask questions and all were answered. The patient then provided verbal informed consent. The skin was then prepped with betadine solution and alcohol. Under aseptic conditions, the  right glenohumeral joint was injected with 2cc of 1% xylocaine, then a mixture of 3cc of 0.25% marcaine and 2cc (80 mg) of Depomedrol. There were no immediate complications following the injection. The patient was advised of the possibility of injection site reaction and instructed to apply ice to the area. This dictation was performed with a verbal recognition program (DRAGON) and it was checked for errors. It is possible that there are still dictated errors within this office note. If so, please bring any errors to my attention for an addendum. All efforts were made to ensure that this office note is accurate.

## 2017-10-04 NOTE — MR AVS SNAPSHOT
After Visit Summary             Gala Mckinley   10/4/2017 1:30 PM   Office Visit    Description:  Female : 1935   Provider:  Perri Bocanegra MD   Department:  [address] [place] [address] Drive and Future Appointments         Below is a list of your follow-up and future appointments. This may not be a complete list as you may have made appointments directly with providers that we are not aware of or your providers may have made some for you. Please call your providers to confirm appointments. It is important to keep your appointments. Please bring your current insurance card, photo ID, co-pay, and all medication bottles to your appointment. If self-pay, payment is expected at the time of service. Your To-Do List     Future Appointments Provider Department Dept Phone    10/9/2017 2:00 PM Raissa Mondragon MD [facility] [phone]    Please arrive 15 minutes prior to appointment, bring photo ID and insurance card. Follow-Up    Return if symptoms worsen or fail to improve. Information from Your Visit        Department     Name Address Phone Fax    [facility] [address] [phone]      You Were Seen for:         Comments    Right shoulder pain, unspecified chronicity   [[id number]]         Vital Signs     Blood Pressure Pulse Height Weight Body Mass Index Smoking Status    129/76 62 5' 6\" (1.676 m) 156 lb (70.8 kg) 25.18 kg/m2 Never Smoker      Additional Information about your Body Mass Index (BMI)           Your BMI as listed above is considered overweight (25.0-29.9). BMI is an estimate of body fat, calculated from your height and weight. The higher your BMI, the greater your risk of heart disease, high blood pressure, type 2 diabetes, stroke, gallstones, arthritis, sleep apnea, and certain cancers. BMI is not perfect.   It may overestimate body fat in athletes and [employer] LANCETS ULTRATHIN 30G MISC USE TO TEST BLOOD SUGAR LEVELS ONCE DAILY  Dx E11.9    Glucosamine-Chondroitin (MOVE FREE PO) Take  by mouth. ibuprofen (ADVIL) 200 MG tablet Take 200 mg by mouth every 6 hours as needed. Allergies              Metformin And Related Other (See Comments)    diarrhea     Darvocet [Propoxyphene N-acetaminophen] Nausea And Vomiting      We Ordered/Performed the following           IL ARTHROCENTESIS ASPIR&/INJ MAJOR JT/BURSA W/O US     IL METHYLPREDNISOLONE 40 MG INJ     XR SHOULDER RIGHT (MIN 2 VIEWS)     Comments:    650[address]         Result Summary for XR SHOULDER RIGHT (MIN 2 VIEWS)      Result Information     Status          Final result (Exam End: 10/4/2017  1:31 PM)           10/4/2017  1:31 PM      Narrative & Impression           Radiology result is complete; follow up with provider / physician office for radiology results                       Additional Information        Basic Information     Date Of Birth Sex Race Ethnicity Preferred Language    1935 Female White Non-/Non  English      Problem List as of 10/4/2017  Date Reviewed: 10/4/2017                Status post hip replacement    Essential hypertension    Type 2 diabetes mellitus without complication ([facility] [address]    Mixed hyperlipidemia    Ganglion cyst    Scoliosis    Elevated LFTs    Osteoarthritis      Your Goals as of 10/4/2017 at 2:45 PM              10/4/17    7/26/17    6/15/17       Blood Pressure    Blood Pressure < 140/90   129/76  136/60  150/64    Notes    Patient Self-Management Goal for Chronic Condition  Goal: I will take all medications as prescribed by my doctor, and I will call the office if I am having any medication problems.   Barriers to success: none  Plan for overcoming my barriers: N/A     Confidence: 10/10  Date goal set:4/17/17  Date goal attained:         Immunizations as of 10/4/2017     Name Date    Influenza Virus Vaccine 10/2/2013, 9/24/2012, 11/29/2011, 10/27/2010, 9/16/2009    Influenza, High Dose 10/18/2016, 10/24/2014    Pneumococcal 13-valent Conjugate ([id number]) 2/1/2016    Pneumococcal Polysaccharide ([id number]) 8/8/2008, 4/30/2003    Td 7/11/2006      Preventive Care        Date Due    Tetanus Combination Vaccine (1 - Tdap) 7/12/2006    Yearly Flu Vaccine (1) 9/1/2017    Hemoglobin A1C (Test For Long-Term Glucose Control) 12/15/2017    Cholesterol Screening 1/9/2018    Eye Exam By An Eye Doctor 1/12/2018    Diabetic Foot Exam 4/18/2018            MyChart Signup           Our records indicate that you have declined MyChart signup.

## 2017-10-05 ENCOUNTER — TELEPHONE (OUTPATIENT)
Dept: ORTHOPEDIC SURGERY | Age: 82
End: 2017-10-05

## 2017-10-09 ENCOUNTER — OFFICE VISIT (OUTPATIENT)
Dept: INTERNAL MEDICINE CLINIC | Age: 82
End: 2017-10-09

## 2017-10-09 VITALS
HEART RATE: 95 BPM | BODY MASS INDEX: 24.86 KG/M2 | OXYGEN SATURATION: 97 % | WEIGHT: 154 LBS | SYSTOLIC BLOOD PRESSURE: 128 MMHG | DIASTOLIC BLOOD PRESSURE: 68 MMHG

## 2017-10-09 DIAGNOSIS — Z23 NEED FOR INFLUENZA VACCINATION: ICD-10-CM

## 2017-10-09 DIAGNOSIS — Z23 NEED FOR DIPHTHERIA-TETANUS-PERTUSSIS (TDAP) VACCINE: ICD-10-CM

## 2017-10-09 DIAGNOSIS — I10 ESSENTIAL HYPERTENSION: ICD-10-CM

## 2017-10-09 DIAGNOSIS — E78.2 MIXED HYPERLIPIDEMIA: ICD-10-CM

## 2017-10-09 DIAGNOSIS — G47.9 SLEEPING DIFFICULTIES: ICD-10-CM

## 2017-10-09 DIAGNOSIS — E11.9 TYPE 2 DIABETES MELLITUS WITHOUT COMPLICATION, WITHOUT LONG-TERM CURRENT USE OF INSULIN (HCC): Primary | ICD-10-CM

## 2017-10-09 LAB — HBA1C MFR BLD: 7.9 %

## 2017-10-09 PROCEDURE — G8427 DOCREV CUR MEDS BY ELIG CLIN: HCPCS | Performed by: FAMILY MEDICINE

## 2017-10-09 PROCEDURE — 1123F ACP DISCUSS/DSCN MKR DOCD: CPT | Performed by: FAMILY MEDICINE

## 2017-10-09 PROCEDURE — 83036 HEMOGLOBIN GLYCOSYLATED A1C: CPT | Performed by: FAMILY MEDICINE

## 2017-10-09 PROCEDURE — 1036F TOBACCO NON-USER: CPT | Performed by: FAMILY MEDICINE

## 2017-10-09 PROCEDURE — 99214 OFFICE O/P EST MOD 30 MIN: CPT | Performed by: FAMILY MEDICINE

## 2017-10-09 PROCEDURE — G8484 FLU IMMUNIZE NO ADMIN: HCPCS | Performed by: FAMILY MEDICINE

## 2017-10-09 PROCEDURE — G0008 ADMIN INFLUENZA VIRUS VAC: HCPCS | Performed by: FAMILY MEDICINE

## 2017-10-09 PROCEDURE — G8400 PT W/DXA NO RESULTS DOC: HCPCS | Performed by: FAMILY MEDICINE

## 2017-10-09 PROCEDURE — 90662 IIV NO PRSV INCREASED AG IM: CPT | Performed by: FAMILY MEDICINE

## 2017-10-09 PROCEDURE — 4040F PNEUMOC VAC/ADMIN/RCVD: CPT | Performed by: FAMILY MEDICINE

## 2017-10-09 PROCEDURE — 1090F PRES/ABSN URINE INCON ASSESS: CPT | Performed by: FAMILY MEDICINE

## 2017-10-09 PROCEDURE — G8420 CALC BMI NORM PARAMETERS: HCPCS | Performed by: FAMILY MEDICINE

## 2017-10-09 ASSESSMENT — ENCOUNTER SYMPTOMS
NAUSEA: 0
RHINORRHEA: 0
BACK PAIN: 0
CHOKING: 0
ABDOMINAL PAIN: 0
CONSTIPATION: 0
COUGH: 0
SORE THROAT: 0
TROUBLE SWALLOWING: 0
WHEEZING: 0
VOMITING: 0
DIARRHEA: 0

## 2017-10-09 NOTE — PATIENT INSTRUCTIONS
Go for Tdap vaccine at the pharmacy with Rx I gave you    Return for fasting bloodwork    Bring log of BS and BP readings to next appt    Let me know of your BP is consistently >140/90

## 2017-10-09 NOTE — PROGRESS NOTES
stable per Pt   Musculoskeletal: Normal range of motion. She exhibits no edema. Right pelvis rides higher than left, altered gait, walks with a cane   Lymphadenopathy:     She has no cervical adenopathy. Neurological: She is alert and oriented to person, place, and time. No cranial nerve deficit. Skin: Skin is warm and dry. No rash noted. She is not diaphoretic. Psychiatric: She has a normal mood and affect. Her behavior is normal. Judgment and thought content normal.   Vitals reviewed. Vitals:    10/09/17 1359   BP: 128/68   Site: Right Arm   Position: Sitting   Cuff Size: Medium Adult   Pulse: 95   SpO2: 97%   Weight: 154 lb (69.9 kg)     Body mass index is 24.86 kg/m². Assessment/Plan:    1. Type 2 diabetes mellitus without complication, without long-term current use of insulin (HCC)  A1c was improved at 7.9 (from 8.6)  today  Currently on glimeperide  We called the pharmacy to inquire into filling patterns of recently prescribed Jardiance, and she had not yet picked it up; I encouraged her to try this medication as we could stand to further improve her DM control  Urged her to cut back on ice cream and continue to make healthier food choices  Recent eye exam and Foot exam were normal  Continue on ACEI, and statin      2. Essential hypertension  BP was well-controlled today and at home   Continue on amlodipine and lisinopril  continue watching BP closely  Rec low salt diet    3. Mixed hyperlipidemia  Stable on Lipitor 40 mg  Tolerating statin well without side effect    - Lipid Panel; Future    4. Sleeping difficulties  Continue on melatonin  Previously advised her to continue avoiding OTC sleep aids that contain benadryl  Encouraged better sleep hygiene (avoid turning on the TV in the middle of the night)  Discussed considering a sleep study in the future to eval for COLE or movement d/o; Referred to sleep medicine previously but patient is still not ready to proceed    5.  Need for influenza vaccination  - INFLUENZA, HIGH DOSE, 65 YRS +, IM, PF, PREFILL SYR, 0.5ML (FLUZONE HD)    6. Need for diphtheria-tetanus-pertussis (Tdap) vaccine  - Tetanus-Diphth-Acell Pertussis (BOOSTRIX) 5-2.5-18.5 LF-MCG/0.5 injection; Inject 0.5 mLs into the muscle once for 1 dose  Dispense: 0.5 mL; Refill: 0      Return in about 3 months (around 1/9/2018) for DM, HTN, HLD.

## 2017-10-09 NOTE — PROGRESS NOTES
Vaccine Information Sheet, \"Influenza - Inactivated\"  given to Lali Conroy, or parent/legal guardian of  Lali Conroy and verbalized understanding. Patient responses:    Have you ever had a reaction to a flu vaccine? No  Are you able to eat eggs without adverse effects? Yes  Do you have any current illness? No  Have you ever had Guillian [place] Syndrome? No    Flu vaccine given per order. Please see immunization tab.

## 2017-10-16 NOTE — TELEPHONE ENCOUNTER
Please let her know that I sent an Rx for Invokana to try to obtain.  Let me know what the cost is when she gets it please, and have her call back for instructions

## 2017-10-17 ENCOUNTER — HOSPITAL ENCOUNTER (OUTPATIENT)
Dept: GENERAL RADIOLOGY | Age: 82
Discharge: HOME | End: 2017-10-17
Attending: FAMILY MEDICINE | Admitting: FAMILY MEDICINE

## 2017-10-17 DIAGNOSIS — E11.9 TYPE 2 DIABETES MELLITUS WITHOUT COMPLICATION, WITHOUT LONG-TERM CURRENT USE OF INSULIN (HCC): ICD-10-CM

## 2017-10-17 DIAGNOSIS — E78.2 MIXED HYPERLIPIDEMIA: ICD-10-CM

## 2017-10-17 LAB
A/G RATIO: 1.1 (ref 1.1–2.2)
ALBUMIN SERPL-MCNC: 4.3 G/DL (ref 3.4–5)
ALP BLD-CCNC: 93 U/L (ref 40–129)
ALT SERPL-CCNC: 30 U/L (ref 10–40)
ANION GAP SERPL CALCULATED.3IONS-SCNC: 17 MMOL/L (ref 3–16)
AST SERPL-CCNC: 30 U/L (ref 15–37)
BILIRUB SERPL-MCNC: 0.7 MG/DL (ref 0–1)
BUN BLDV-MCNC: 39 MG/DL (ref 7–20)
CALCIUM SERPL-MCNC: 9.6 MG/DL (ref 8.3–10.6)
CHLORIDE BLD-SCNC: 102 MMOL/L (ref 99–110)
CHOLESTEROL, TOTAL: 221 MG/DL (ref 0–199)
CO2: 22 MMOL/L (ref 21–32)
CREAT SERPL-MCNC: 1.1 MG/DL (ref 0.6–1.2)
GFR AFRICAN AMERICAN: 58
GFR NON-AFRICAN AMERICAN: 48
GLOBULIN: 3.9 G/DL
GLUCOSE BLD-MCNC: 140 MG/DL (ref 70–99)
HDLC SERPL-MCNC: 50 MG/DL (ref 40–60)
LDL CHOLESTEROL CALCULATED: 120 MG/DL
POTASSIUM SERPL-SCNC: 5.3 MMOL/L (ref 3.5–5.1)
SODIUM BLD-SCNC: 141 MMOL/L (ref 136–145)
TOTAL PROTEIN: 8.2 G/DL (ref 6.4–8.2)
TRIGL SERPL-MCNC: 253 MG/DL (ref 0–150)
VLDLC SERPL CALC-MCNC: 51 MG/DL

## 2017-10-20 ENCOUNTER — TELEPHONE (OUTPATIENT)
Dept: INTERNAL MEDICINE CLINIC | Age: 82
End: 2017-10-20

## 2017-10-23 NOTE — PROGRESS NOTES
CMP suggests mild dehydration  Lipids are elevated with , ,     Be sure to drink plenty of water, and take atorvastatin/lipitor daily

## 2017-11-16 ENCOUNTER — TELEPHONE (OUTPATIENT)
Dept: INTERNAL MEDICINE CLINIC | Age: 82
End: 2017-11-16

## 2017-11-16 NOTE — TELEPHONE ENCOUNTER
Patient began taking jardiance about 3 weeks ago, and after about a week , she started feeling \"not right\" in the mornings. Velvet Mendez She states it is getting progressively worse. Feels light headed or dizzy and has trouble walking and getting her balance. It improves as the day goes on, but is never entirely gone. Please advise  [phone]. She will be out between 1 and 4.

## 2017-11-24 ENCOUNTER — OFFICE VISIT (OUTPATIENT)
Dept: ORTHOPEDIC SURGERY | Age: 82
End: 2017-11-24

## 2017-11-24 VITALS
WEIGHT: 154.1 LBS | BODY MASS INDEX: 24.77 KG/M2 | HEART RATE: 72 BPM | HEIGHT: 66 IN | DIASTOLIC BLOOD PRESSURE: 82 MMHG | SYSTOLIC BLOOD PRESSURE: 121 MMHG

## 2017-11-24 DIAGNOSIS — M54.32 SCIATICA OF LEFT SIDE: ICD-10-CM

## 2017-11-24 DIAGNOSIS — M25.551 BILATERAL HIP PAIN: Primary | ICD-10-CM

## 2017-11-24 DIAGNOSIS — M51.36 DDD (DEGENERATIVE DISC DISEASE), LUMBAR: ICD-10-CM

## 2017-11-24 DIAGNOSIS — M25.552 BILATERAL HIP PAIN: Primary | ICD-10-CM

## 2017-11-24 DIAGNOSIS — M70.62 GREATER TROCHANTERIC BURSITIS OF LEFT HIP: ICD-10-CM

## 2017-11-24 DIAGNOSIS — M21.70 LEG LENGTH DISCREPANCY: ICD-10-CM

## 2017-11-24 DIAGNOSIS — M16.12 PRIMARY OSTEOARTHRITIS OF LEFT HIP: ICD-10-CM

## 2017-11-24 DIAGNOSIS — Z96.641 HISTORY OF TOTAL RIGHT HIP REPLACEMENT: ICD-10-CM

## 2017-11-24 PROCEDURE — G8427 DOCREV CUR MEDS BY ELIG CLIN: HCPCS | Performed by: ORTHOPAEDIC SURGERY

## 2017-11-24 PROCEDURE — G8420 CALC BMI NORM PARAMETERS: HCPCS | Performed by: ORTHOPAEDIC SURGERY

## 2017-11-24 PROCEDURE — 1090F PRES/ABSN URINE INCON ASSESS: CPT | Performed by: ORTHOPAEDIC SURGERY

## 2017-11-24 PROCEDURE — 20611 DRAIN/INJ JOINT/BURSA W/US: CPT | Performed by: ORTHOPAEDIC SURGERY

## 2017-11-24 PROCEDURE — 4040F PNEUMOC VAC/ADMIN/RCVD: CPT | Performed by: ORTHOPAEDIC SURGERY

## 2017-11-24 PROCEDURE — 99214 OFFICE O/P EST MOD 30 MIN: CPT | Performed by: ORTHOPAEDIC SURGERY

## 2017-11-24 PROCEDURE — G8400 PT W/DXA NO RESULTS DOC: HCPCS | Performed by: ORTHOPAEDIC SURGERY

## 2017-11-24 PROCEDURE — G8484 FLU IMMUNIZE NO ADMIN: HCPCS | Performed by: ORTHOPAEDIC SURGERY

## 2017-11-24 PROCEDURE — 1123F ACP DISCUSS/DSCN MKR DOCD: CPT | Performed by: ORTHOPAEDIC SURGERY

## 2017-11-24 PROCEDURE — 1036F TOBACCO NON-USER: CPT | Performed by: ORTHOPAEDIC SURGERY

## 2017-11-24 NOTE — PROGRESS NOTES
CHIEF COMPLAINT:    Chief Complaint   Patient presents with    New Patient     Bilateral Hip: pain started few weeks ago, no injury, most pain in butt, increase pain in the morning        HISTORY OF PRESENT ILLNESS:                The patient is a [de-identified] y.o. female presents to clinic for evaluation of bilateral hip pain. She has a history of right hip replacement. She states that most of her pain today is actually in the LEFT hip. She does not complain of pain with range of motion of the hip however, experiences significant pain 1st thing in the morning when ambulating longer distances. She points to the lateral and posterior aspect of the LEFT hip for pain. She states the pain does not radiate down the leg. She also states that she feels as though the right hip is shorter than the LEFT. She does wear a heel insert in the LEFT shoe only however, has been doing this for 50+ years. Past Medical History:   Diagnosis Date    DJD (degenerative joint disease)     Eczema     Elevated LFTs     HBP (high blood pressure)     Hyperlipidemia     NIDDM (non-insulin dependent diabetes mellitus)     Scoliosis           The pain assessment was noted & is as follows:  Pain Assessment  Location of Pain: Pelvis  Location Modifiers: Left, Right  Severity of Pain: 3 (8/10 when she first wakes up )  Quality of Pain: Aching  Duration of Pain: Persistent  Frequency of Pain: Constant  Aggravating Factors:  Other (Comment) (sit to stand, laying down to stand, and in the morning walking, but over time walking makes it fell a little better )  Limiting Behavior: Some  Relieving Factors: Rest  Result of Injury: No  Work-Related Injury: No  Are there other pain locations you wish to document?: No]      Work Status/Functionality:     Past Medical History: Medical history form was reviewed today & can be found in the media tab  Past Medical History:   Diagnosis Date    DJD (degenerative joint disease)     Eczema     Elevated LFTs of motion. Old, well-healed surgical incision is noted. Brief examination of the lumbar spine reveals no midline tenderness. Range of motion limited with forward bend. Negative straight leg raise testing bilaterally. Diagnostic Testing:      Views:  3   Location:  Bilateral hips. One AP view of the pelvis and one additional view of each hip   Findings:  X-rays taken reveal a right total hip replacement alignment with no signs of loosening. She does have FAIRLY severe osteoarthritis of the LEFT hip as well as her lumbar spine. No acute findings. she actually is mildly shorter LEFT hip on x-ray. From a leg length perspective, the surgical total hip right leg is actually about a centimeter longer than the arthritic LEFT side. The patient actually perceives the opposite. Orders     Orders Placed This Encounter   Procedures    XR HIP BILATERAL W AP PELVIS (2 VIEWS)     62142     Order Specific Question:   Reason for exam:     Answer:   Pain    US Guided Needle Placement    Ambulatory referral to Physical Therapy     Referral Priority:   Routine     Referral Type:   Eval and Treat     Referral Reason:   Specialty Services Required     Requested Specialty:   Physical Therapy     Number of Visits Requested:   1    MD ARTHROCENTESIS ASPIR&/INJ MAJOR JT/BURSA W/O US    MD METHYLPREDNISOLONE 40 MG INJ    Foot Insert, Removable, Molded Orthotics     Standing Status:   Future     Standing Expiration Date:   11/24/2018         Assessment / Treatment Plan:     1. LEFT hip osteoarthritis/ trochanteric bursitis    2. History of right total hip replacement    3. DDD lumbar spine with left-sided    She was given a trochanteric cortisone injection of the LEFT hip today. She is also given a referral to physical therapy for her left-sided sciatica and a referral to [facility] for custom orthotics given her perceived leg length discrepancy.   We'll see her back in 3-4 weeks if her symptoms do not improve

## 2017-11-28 ENCOUNTER — HOSPITAL ENCOUNTER (OUTPATIENT)
Dept: PHYSICAL THERAPY | Age: 82
Discharge: HOME | End: 2017-11-30
Admitting: ORTHOPAEDIC SURGERY

## 2017-11-28 NOTE — PLAN OF CARE
that recent x-rays show that right LE is actually 1 cm longer then L LE. Pt states that she has worn shoe insert on the left for past 50 years. Dr. Kiana Stoddard has given her a script for custom orthotic evaluation w/ [facility]. Received cortisone injection into lateral aspect of left hip 4 days ago, but it has not changed her symptoms. Pat Seals on her buttocks about 6 months ago when she missed the chair she was sitting back on, otherwise no fall history    Relevant Medical History: Right THR 2011  X-rays (11-):X-rays taken reveal a right total hip replacement alignment with no signs of loosening. She does have FAIRLY severe osteoarthritis of the LEFT hip as well as her lumbar spine. No acute findings. she actually is mildly shorter LEFT hip on x-ray.     From a leg length perspective, the surgical total hip right leg is actually about a centimeter longer than the arthritic LEFT side. The patient actually perceives the opposite. Functional Disability Index/G-Codes:  PT G-Codes  Functional Assessment Tool Used: LEFS  Score: 75%  Functional Limitation: Mobility: Walking and moving around  Mobility: Walking and Moving Around Current Status (): At least 60 percent but less than 80 percent impaired, limited or restricted  Mobility: Walking and Moving Around Goal Status (): At least 40 percent but less than 60 percent impaired, limited or restricted    Pain Scale: 8/10 when first rising, then slowly down to 3/10 when she get moving around. Easing factors: rest  Provocative factors: first standing, standing, walking, stairs, squatting, kneeling, bending, lifting    Type: []Constant   [x]Intermittent  []Radiating []Localized []other:     Numbness/Tingling: none    Occupation/School: Retired/ lives alone    Living Status/Prior Level of Function: Lives alone in 2 story house w/ laundry in basement. Has had to crawl up the stairs recently due to increase bilat hip pain.   Independent with ADLs and dermatome dysfunction   []Signs/symptoms consistent with post-surgical status including: decreased ROM, strength and function. []signs/symptoms consistent with pathology which may benefit from Dry needling     [x]other: Left Hip OA and tendinitis/bursitis. Right hip gluteal weakness. Prognosis/Rehab Potential:      []Excellent   [x]Good    []Fair   []Poor    Tolerance of evaluation/treatment:    []Excellent   [x]Good    []Fair   []Poor  Physical Therapy Evaluation Complexity Justification  [x] A history of present problem with:  [] no personal factors and/or comorbidities that impact the plan of care;  []1-2 personal factors and/or comorbidities that impact the plan of care  [x]3 personal factors and/or comorbidities that impact the plan of care  [x] An examination of body systems using standardized tests and measures addressing any of the following: body structures and functions (impairments), activity limitations, and/or participation restrictions;:  [] a total of 1-2 or more elements   [x] a total of 3 or more elements   [] a total of 4 or more elements   [x] A clinical presentation with:  [] stable and/or uncomplicated characteristics   [x] evolving clinical presentation with changing characteristics  [] unstable and unpredictable characteristics;   [x] Clinical decision making of [] low, [x] moderate, [] high complexity using standardized patient assessment instrument and/or measurable assessment of functional outcome.     [] EVAL (LOW) 10027 (typically 20 minutes face-to-face)  [x] EVAL (MOD) 11106 (typically 30 minutes face-to-face)  [] EVAL (HIGH) 91378 (typically 45 minutes face-to-face)  [] RE-EVAL         PLAN: Begin PT focusing on: proximal hip mobilizations, LB mobs, LB core activation, proximal hip activation, and HEP    Frequency/Duration:  2 days per week for 6 Weeks:  Interventions:  [x]  Therapeutic exercise including: strength training, ROM, for LE, Glutes and core   [x]  NMR activation and

## 2017-11-28 NOTE — FLOWSHEET NOTE
[facility],  [address]  Phone: [phone]  Fax [phone]    Physical Therapy Daily Treatment Note  Date:  2017    Patient Name:  Rodo Pham    :  1935  MRN: [id number]  Restrictions/Precautions:  Right THR 2011, Lumbar DDD, DM, HTN, HLD  Medical/Treatment Diagnosis Information:  · Diagnosis: M54.32 Left Sciatica, M70.62 Left Hip Greater Trochanteric Bursitis  · Treatment Diagnosis: M25.552 Left Hip Pain,  Insurance/Certification information:  PT Insurance Information: [facility]  Physician Information:  Referring Practitioner: Dr. Primo Dillon of care signed (Y/N): sent (Due 2017)    Date of Patient follow up with Physician:     G-Code (if applicable):      Date G-Code Applied:    PT G-Codes  Functional Assessment Tool Used: LEFS  Score: 75%  Functional Limitation: Mobility: Walking and moving around  Mobility: Walking and Moving Around Current Status (): At least 60 percent but less than 80 percent impaired, limited or restricted  Mobility: Walking and Moving Around Goal Status (): At least 40 percent but less than 60 percent impaired, limited or restricted    Progress Note: [x]  Yes  []  No  Next due by: Visit #10      Latex Allergy:  [x]NO      []YES  Preferred Language for Healthcare:   [x]English       []other:     Visit # Insurance Allowable Requires auth   1 2xwk for 3 week    [x]no        []yes:     Pain level:  8/10  Left > Right buttock / hip / groin and LB    SUBJECTIVE:  See eval    OBJECTIVE: See eval  Observation:   Test measurements:      RESTRICTIONS/PRECAUTIONS:  Pt has Right THR. Had cortisone injection into left hip recently.     Exercises/Interventions:     Therapeutic Ex sets/reps comments   Dx Review and Back/Hip care Instructions 10 min hep        HL TrA w/ Add ball squeeze H5x10 hep   HL LTR H5x10 hep   HL Hip Abd w/ Blue TB H5 x10 hep   Supine Psoas Release H30x3 hep   Seated Calf Stretch w/ strap H30x3 hep                                                Manual Intervention      Right SL: rolling & STM Left Buttock, LB and Left Hip 7 min    Gentle distraction and stretching left hip: HF, IT Band, quad, hip rotators 8 min                                  NMR re-education                                 Therapeutic Exercise and NMR EXR  [x] (53923) Provided verbal/tactile cueing for activities related to strengthening, flexibility, endurance, ROM  for improvements in proximal hip and core control with self care, mobility, lifting and ambulation.  [] (17429) Provided verbal/tactile cueing for activities related to improving balance, coordination, kinesthetic sense, posture, motor skill, proprioception  to assist with core control in self care, mobility, lifting, and ambulation.      Therapeutic Activities:    [] (94233 or 35554) Provided verbal/tactile cueing for activities related to improving balance, coordination, kinesthetic sense, posture, motor skill, proprioception and motor activation to allow for proper function  with self care and ADLs  [] (17246) Provided training and instruction to the patient for proper core and proximal hip recruitment and positioning with ambulation re-education     Home Exercise Program:    [x] (99680) Reviewed/Progressed HEP activities related to strengthening, flexibility, endurance, ROM of core, proximal hip and LE for functional self-care, mobility, lifting and ambulation   [] (83677) Reviewed/Progressed HEP activities related to improving balance, coordination, kinesthetic sense, posture, motor skill, proprioception of core, proximal hip and LE for self care, mobility, lifting, and ambulation      Manual Treatments:  PROM / STM / Oscillations-Mobs:  G-I, II, III, IV (PA's, Inf., Post.)  [] (19006) Provided manual therapy to mobilize proximal hip and LS spine soft tissue/joints for the purpose of modulating pain, promoting relaxation, increasing ROM, reducing/eliminating soft tissue swelling/inflammation/restriction, improving soft tissue extensibility and allowing for proper ROM for normal function with self care, mobility, lifting and ambulation. Modalities:  HV ES/ CP to left hip and HMP to LB in supine w/ legs over wedges x15 min     Charges:  Timed Code Treatment Minutes: 35   Total Treatment Minutes: 80     [] EVAL (LOW) 36990 (typically 20 minutes face-to-face)  [x] EVAL (MOD) 84544 (typically 30 minutes face-to-face)  [] EVAL (HIGH) 86770 (typically 45 minutes face-to-face)  [] RE-EVAL     [x] BZ(16830) x  1   [] IONTO  [] NMR (84382) x      [] VASO  [x] Manual (59951) x  1    [] Other:  [] TA x       [] Mech Traction (94353)  [] ES(attended) (81132)      [x] ES (un) (35873):     Goals:   Short Term Goals: To be achieved in: 2 weeks  1. Independent in HEP and progression per patient tolerance, in order to prevent re-injury. 2. Patient will have a decrease in pain to facilitate improvement in movement, function, and ADLs as indicated by Functional Deficits.     Long Term Goals: To be achieved in: 6 weeks  1. Disability index score of  50 % or less for the LEFS    to assist with reaching prior level of function. 2. Patient will demonstrate increased AROM to WNL, good LS mobility, good hip ROM to allow for proper joint functioning as indicated by patients Functional Deficits. 3. Patient will demonstrate an increase in Strength to good proximal hip and core activation to allow for proper functional mobility as indicated by patients Functional Deficits. 4. Patient will return to rise from chair without increased symptoms or restriction. 5. Walk up and down flight of stairs w/o increase in symptoms. Progression Towards Functional goals:  [] Patient is progressing as expected towards functional goals listed. [] Progression is slowed due to complexities listed.   [] Progression has been slowed due to co-morbidities.   [x] Plan just implemented, too soon to assess goals progression  [] Other:     ASSESSMENT:  See eval    Treatment/Activity Tolerance:  [x] Patient tolerated treatment well [] Patient limited by fatique  [x] Patient limited by pain  [] Patient limited by other medical complications  [] Other:     Prognosis: [] Good [x] Fair  [] Poor    Patient Requires Follow-up: [x] Yes  [] No    PLAN: See eval  [] Continue per plan of care [] Alter current plan (see comments)  [x] Plan of care initiated [] Hold pending MD visit [] Discharge    Electronically signed by: Olivia Acosta, [address]

## 2017-12-01 ENCOUNTER — HOSPITAL ENCOUNTER (OUTPATIENT)
Dept: PHYSICAL THERAPY | Age: 82
Discharge: HOME | End: 2017-12-31
Attending: ORTHOPAEDIC SURGERY | Admitting: ORTHOPAEDIC SURGERY

## 2017-12-04 ENCOUNTER — HOSPITAL ENCOUNTER (OUTPATIENT)
Dept: PHYSICAL THERAPY | Age: 82
Discharge: HOME | End: 2017-12-04
Admitting: ORTHOPAEDIC SURGERY

## 2017-12-04 NOTE — TELEPHONE ENCOUNTER
Refill request for jardiance medication.      Name of Evan [facility]    Last visit - 10/9/17     Pending visit - 1/9/18    Last refill -7/18/17  3 refills

## 2017-12-04 NOTE — FLOWSHEET NOTE
[facility], [address]  Phone: [phone]  Fax [phone]    Physical Therapy Daily Treatment Note  Date:  2017    Patient Name:  Ernestine Briggs    :  1935  MRN: [id number]  Restrictions/Precautions:  Right THR 2011, Lumbar DDD, DM, HTN, HLD  Medical/Treatment Diagnosis Information:  · Diagnosis: M54.32 Left Sciatica, M70.62 Left Hip Greater Trochanteric Bursitis  · Treatment Diagnosis: M25.552 Left Hip Pain,  Insurance/Certification information:  PT Insurance Information: [facility]  Physician Information:  Referring Practitioner: Dr. Leonora Claire of care signed (Y/N): sent (Due 2017)    Date of Patient follow up with Physician:     G-Code (if applicable):      Date G-Code Applied:         Progress Note: [x]  Yes  []  No  Next due by: Visit #10      Latex Allergy:  [x]NO      []YES  Preferred Language for Healthcare:   [x]English       []other:     Visit # Insurance Allowable Requires auth   2 2xwk for 3 week    [x]no        []yes:     Pain level:  7/10  Left > Right buttock / hip / groin and LB    SUBJECTIVE:  Still having a lot of pain in both buttocks and low back. OBJECTIVE:   Observation: Pt experiences some vertigo when changing positions. Monitor closely. Pt encouraged to use SC or walker when in community and when coming to PT. Able to progress TE w/ extended rest breaks and close monitoring. Test measurements:      RESTRICTIONS/PRECAUTIONS:  Pt has Right THR. Had cortisone injection into left hip recently. PT has DM/HTN: monitor closely for dizziness.                  Exercises/Interventions:     Therapeutic Ex & NMR sets/reps comments        HL TrA w/ Add ball squeeze H5x10 hep   SB LTR H5x10 hep   SB DKTC H5x10 R THR precautions   SB Bridge H5 x10    HL Hip Abd w/ Blue TB H5 x10 hep   Supine Psoas Release H30x3 hep   hep   Gastroc Stretch on Incline H30x3    Sidestepping @ half wall OTB @ Knees 1 lap    Seated HS Stretch w/ Chairs H30x3 B    Mini Wall Slide w/ Add Ball Squeeze H5 x10                             Manual Intervention      Right SL: rolling & STM Left Buttock, LB and Left Hip 8 min    Gentle distraction and stretching left hip: HF, IT Band, quad, hip rotators 8 min                                                             Therapeutic Exercise and NMR EXR  [x] (70955) Provided verbal/tactile cueing for activities related to strengthening, flexibility, endurance, ROM  for improvements in proximal hip and core control with self care, mobility, lifting and ambulation.  [] (06059) Provided verbal/tactile cueing for activities related to improving balance, coordination, kinesthetic sense, posture, motor skill, proprioception  to assist with core control in self care, mobility, lifting, and ambulation.      Therapeutic Activities:    [] (84765 or 50456) Provided verbal/tactile cueing for activities related to improving balance, coordination, kinesthetic sense, posture, motor skill, proprioception and motor activation to allow for proper function  with self care and ADLs  [] (99747) Provided training and instruction to the patient for proper core and proximal hip recruitment and positioning with ambulation re-education     Home Exercise Program:    [x] (62551) Reviewed/Progressed HEP activities related to strengthening, flexibility, endurance, ROM of core, proximal hip and LE for functional self-care, mobility, lifting and ambulation   [] (09520) Reviewed/Progressed HEP activities related to improving balance, coordination, kinesthetic sense, posture, motor skill, proprioception of core, proximal hip and LE for self care, mobility, lifting, and ambulation      Manual Treatments:  PROM / STM / Oscillations-Mobs:  G-I, II, III, IV (PA's, Inf., Post.)  [] (23546) Provided manual therapy to mobilize proximal hip and LS spine soft tissue/joints for the purpose of modulating pain, promoting relaxation,  increasing ROM, reducing/eliminating soft tissue swelling/inflammation/restriction, improving soft tissue extensibility and allowing for proper ROM for normal function with self care, mobility, lifting and ambulation. Modalities:  HV ES/ CP to left hip and HMP to LB in supine w/ legs over wedges x15 min     Charges:  Timed Code Treatment Minutes: 45   Total Treatment Minutes: 60     [] EVAL (LOW) 50014 (typically 20 minutes face-to-face)  [] EVAL (MOD) 10428 (typically 30 minutes face-to-face)  [] EVAL (HIGH) 11781 (typically 45 minutes face-to-face)  [] RE-EVAL     [x] IF(75666) x  1   [] IONTO  [x] NMR (83858) x  1   [] VASO  [x] Manual (75777) x  1    [] Other:  [] TA x       [] Mech Traction (17635)  [] ES(attended) (67369)      [x] ES (un) (43620):     Goals:   Short Term Goals: To be achieved in: 2 weeks  1. Independent in HEP and progression per patient tolerance, in order to prevent re-injury. 2. Patient will have a decrease in pain to facilitate improvement in movement, function, and ADLs as indicated by Functional Deficits.     Long Term Goals: To be achieved in: 6 weeks  1. Disability index score of  50 % or less for the LEFS    to assist with reaching prior level of function. 2. Patient will demonstrate increased AROM to WNL, good LS mobility, good hip ROM to allow for proper joint functioning as indicated by patients Functional Deficits. 3. Patient will demonstrate an increase in Strength to good proximal hip and core activation to allow for proper functional mobility as indicated by patients Functional Deficits. 4. Patient will return to rise from chair without increased symptoms or restriction. 5. Walk up and down flight of stairs w/o increase in symptoms. Progression Towards Functional goals:  [] Patient is progressing as expected towards functional goals listed. [] Progression is slowed due to complexities listed.   [] Progression has been slowed due to co-morbidities.   [x] Plan just implemented, too soon to assess goals progression  [] Other:     ASSESSMENT:  See eval    Treatment/Activity Tolerance:  [x] Patient tolerated treatment well [] Patient limited by fatique  [x] Patient limited by pain  [] Patient limited by other medical complications  [] Other:     Prognosis: [] Good [x] Fair  [] Poor    Patient Requires Follow-up: [x] Yes  [] No    PLAN: See eval  [x] Continue per plan of care [] Alter current plan (see comments)  [] Plan of care initiated [] Hold pending MD visit [] Discharge    Electronically signed by: Paulina Harris, [address]

## 2017-12-04 NOTE — TELEPHONE ENCOUNTER
I don't think she is taking Jardiance any longer (due to some side effects).  Please call her to clarify

## 2017-12-08 ENCOUNTER — HOSPITAL ENCOUNTER (OUTPATIENT)
Dept: PHYSICAL THERAPY | Age: 82
Discharge: HOME | End: 2017-12-08
Admitting: ORTHOPAEDIC SURGERY

## 2017-12-08 NOTE — FLOWSHEET NOTE
[facility], [address]  Phone: [phone]  Fax [phone]    Physical Therapy Daily Treatment Note  Date:  2017    Patient Name:  Nato Trotter    :  1935  MRN: [id number]  Restrictions/Precautions:  Right THR 2011, Lumbar DDD, DM, HTN, HLD  Medical/Treatment Diagnosis Information:  · Diagnosis: M54.32 Left Sciatica, M70.62 Left Hip Greater Trochanteric Bursitis  · Treatment Diagnosis: M25.552 Left Hip Pain,  Insurance/Certification information:  PT Insurance Information: [facility]  Physician Information:  Referring Practitioner: Dr. Clive Fowler of care signed (Y/N): sent (Due 2017)    Date of Patient follow up with Physician:     G-Code (if applicable):      Date G-Code Applied:         Progress Note: [x]  Yes  []  No  Next due by: Visit #10      Latex Allergy:  [x]NO      []YES  Preferred Language for Healthcare:   [x]English       []other:     Visit # Insurance Allowable Requires auth   3 2xwk for 3 week    [x]no        []yes:     Pain level:  3/10  Left < Right buttock / hip / groin and LB    SUBJECTIVE:  Pt states she was sore after last therapy RX for a day, but then started to feel better. Having more pain in right gluteal and SI region today. Has lost her HEP and needs a new copy. OBJECTIVE:   Observation: Pt enters clinic w/o AD. Fitted w/ SC and GT in clinic and, then, had pt use SC when moving about clinic to reinforce using SC in community of safety. Increase TTP over right SI Joint, right gluteus medius, and right quad. Pt given new written HEP. Test measurements:  Short Left LE. IC become level w/ 1/3 inch insert under left heel. Pt encouraged to resume wearing left heel insert to decrease stresses to right pelvis and LB. RESTRICTIONS/PRECAUTIONS:  Pt has Right THR. Had cortisone injection into left hip recently. PT has DM/HTN: monitor closely for dizziness. Exercises/Interventions:     Therapeutic Ex & NMR sets/reps comments   GT w/ SC level surface, curb, stairs 17 min    Bike 5 min    Standing Gastroc Stretch on incline H30x3    Sidestepping @ half wall OTB @ Knees  2 laps    Seated HS Stretch w/ chairs H30x3    Mini Wall Slide w/ Add ball squeeze H10x10    Airex WS EC x20ea direction         SAQ 3# 3x10 B    HL TrA w/ Add ball squeeze H5x10 hep   SB LTR H5x10 hep   SB DKTC H5x10 R THR precautions   Bridge w/ IN H5 x10    HL Hip Abd w/ Blue TB H5 x10 hep   Supine Psoas Release H30x3 hep                            Manual Intervention      Right SL: rolling & STM Left Buttock, LB and Left Hip 8 min    Gentle distraction and stretching left hip: HF, IT Band, quad, hip rotators 8 min                                                             Therapeutic Exercise and NMR EXR  [x] (78268) Provided verbal/tactile cueing for activities related to strengthening, flexibility, endurance, ROM  for improvements in proximal hip and core control with self care, mobility, lifting and ambulation.  [] (36401) Provided verbal/tactile cueing for activities related to improving balance, coordination, kinesthetic sense, posture, motor skill, proprioception  to assist with core control in self care, mobility, lifting, and ambulation.      Therapeutic Activities:    [] (50508 or 79793) Provided verbal/tactile cueing for activities related to improving balance, coordination, kinesthetic sense, posture, motor skill, proprioception and motor activation to allow for proper function  with self care and ADLs  [] (28415) Provided training and instruction to the patient for proper core and proximal hip recruitment and positioning with ambulation re-education     Home Exercise Program:    [x] (36184) Reviewed/Progressed HEP activities related to strengthening, flexibility, endurance, ROM of core, proximal hip and LE for functional self-care, mobility, lifting and ambulation   [] (04655) Reviewed/Progressed HEP activities related to improving balance, coordination, kinesthetic sense, posture, motor skill, proprioception of core, proximal hip and LE for self care, mobility, lifting, and ambulation      Manual Treatments:  PROM / STM / Oscillations-Mobs:  G-I, II, III, IV (PA's, Inf., Post.)  [] (09540) Provided manual therapy to mobilize proximal hip and LS spine soft tissue/joints for the purpose of modulating pain, promoting relaxation,  increasing ROM, reducing/eliminating soft tissue swelling/inflammation/restriction, improving soft tissue extensibility and allowing for proper ROM for normal function with self care, mobility, lifting and ambulation. Modalities:  HV ES/ CP to left hip and HMP to LB in supine w/ legs over wedges x15 min     Charges:  Timed Code Treatment Minutes: 60   Total Treatment Minutes: 75     [] EVAL (LOW) 50983 (typically 20 minutes face-to-face)  [] EVAL (MOD) 29025 (typically 30 minutes face-to-face)  [] EVAL (HIGH) 00357 (typically 45 minutes face-to-face)  [] RE-EVAL     [x] HP(19998) x  2   [] IONTO  [x] NMR (22821) x  1   [] VASO  [x] Manual (70175) x  1    [] Other:  [] TA x       [] Mech Traction (09784)  [] ES(attended) (18325)      [x] ES (un) (34913):     Goals:   Short Term Goals: To be achieved in: 2 weeks  1. Independent in HEP and progression per patient tolerance, in order to prevent re-injury. Approaching  2. Patient will have a decrease in pain to facilitate improvement in movement, function, and ADLs as indicated by Functional Deficits. Met     Long Term Goals: To be achieved in: 6 weeks  1. Disability index score of  50 % or less for the LEFS    to assist with reaching prior level of function. 2. Patient will demonstrate increased AROM to WNL, good LS mobility, good hip ROM to allow for proper joint functioning as indicated by patients Functional Deficits.    3. Patient will demonstrate an increase in Strength to good proximal hip and core activation to allow for proper functional mobility as indicated by patients Functional Deficits. 4. Patient will return to rise from chair without increased symptoms or restriction. 5. Walk up and down flight of stairs w/o increase in symptoms. Progression Towards Functional goals:  [x] Patient is progressing as expected towards functional goals listed. [] Progression is slowed due to complexities listed. [] Progression has been slowed due to co-morbidities. [] Plan just implemented, too soon to assess goals progression  [] Other:     ASSESSMENT:  Pt would benefit from wearing her left shoe insert to level her pelvis when walking and standing.     Treatment/Activity Tolerance:  [x] Patient tolerated treatment well [] Patient limited by fatique  [x] Patient limited by pain  [] Patient limited by other medical complications  [] Other:     Prognosis: [] Good [x] Fair  [] Poor    Patient Requires Follow-up: [x] Yes  [] No    PLAN: See eval  [x] Continue per plan of care [] Alter current plan (see comments)  [] Plan of care initiated [] Hold pending MD visit [] Discharge    Electronically signed by: Eric Shepard, [address]

## 2017-12-13 ENCOUNTER — TELEPHONE (OUTPATIENT)
Dept: INTERNAL MEDICINE CLINIC | Age: 82
End: 2017-12-13

## 2017-12-13 NOTE — TELEPHONE ENCOUNTER
Patient has had cold symptoms for the past 2 days. Bad non productive  cough, and her chest hurts. She's not wheezing, but her chest feels tight. She has a slight sore throat.   Please advise  [phone]  Uses [facility]

## 2017-12-22 ENCOUNTER — ORTHOTIC/BRACE ENCOUNTER (OUTPATIENT)
Dept: ORTHOPEDIC SURGERY | Age: 82
End: 2017-12-22

## 2017-12-22 NOTE — PROGRESS NOTES
She is seen today for evaluation for custom foot orthotics. She feels as though her left side is shorter than her right. I think this is most likely from Scoliosis. We discussed custom inserts, but her insurance most likely will not cover this. She opted to try some over the counter inserts first. I gave her a recommendation for [employer] insoles or Dr. Semaj Brooks. She will try this and see if it helps. I advised her that she can follow up with me at any time if she wants to pursue custom inserts.

## 2018-01-01 ENCOUNTER — HOSPITAL ENCOUNTER (OUTPATIENT)
Dept: PHYSICAL THERAPY | Age: 83
Discharge: HOME | End: 2018-01-31
Attending: ORTHOPAEDIC SURGERY | Admitting: ORTHOPAEDIC SURGERY

## 2018-01-08 ENCOUNTER — OFFICE VISIT (OUTPATIENT)
Dept: ORTHOPEDIC SURGERY | Age: 83
End: 2018-01-08

## 2018-01-08 VITALS — BODY MASS INDEX: 24.77 KG/M2 | WEIGHT: 154.1 LBS | HEIGHT: 66 IN

## 2018-01-08 DIAGNOSIS — M16.12 PRIMARY OSTEOARTHRITIS OF LEFT HIP: Primary | ICD-10-CM

## 2018-01-08 DIAGNOSIS — Z96.641 HISTORY OF RIGHT HIP REPLACEMENT: ICD-10-CM

## 2018-01-08 DIAGNOSIS — M70.61 GREATER TROCHANTERIC BURSITIS, RIGHT: ICD-10-CM

## 2018-01-08 PROCEDURE — G8427 DOCREV CUR MEDS BY ELIG CLIN: HCPCS | Performed by: ORTHOPAEDIC SURGERY

## 2018-01-08 PROCEDURE — 99214 OFFICE O/P EST MOD 30 MIN: CPT | Performed by: ORTHOPAEDIC SURGERY

## 2018-01-08 PROCEDURE — G8420 CALC BMI NORM PARAMETERS: HCPCS | Performed by: ORTHOPAEDIC SURGERY

## 2018-01-08 PROCEDURE — 1036F TOBACCO NON-USER: CPT | Performed by: ORTHOPAEDIC SURGERY

## 2018-01-08 PROCEDURE — G8400 PT W/DXA NO RESULTS DOC: HCPCS | Performed by: ORTHOPAEDIC SURGERY

## 2018-01-08 PROCEDURE — 20611 DRAIN/INJ JOINT/BURSA W/US: CPT | Performed by: ORTHOPAEDIC SURGERY

## 2018-01-08 PROCEDURE — 1123F ACP DISCUSS/DSCN MKR DOCD: CPT | Performed by: ORTHOPAEDIC SURGERY

## 2018-01-08 PROCEDURE — G8484 FLU IMMUNIZE NO ADMIN: HCPCS | Performed by: ORTHOPAEDIC SURGERY

## 2018-01-08 PROCEDURE — 1090F PRES/ABSN URINE INCON ASSESS: CPT | Performed by: ORTHOPAEDIC SURGERY

## 2018-01-08 PROCEDURE — 4040F PNEUMOC VAC/ADMIN/RCVD: CPT | Performed by: ORTHOPAEDIC SURGERY

## 2018-01-08 NOTE — PROGRESS NOTES
show any tenderness, deformity or injury. Range of motion is unremarkable. There is no gross instability. There are no rashes, ulcerations or lesions. Strength and tone are normal.      Diagnostic Testing:      none    Orders   No orders of the defined types were placed in this encounter. Assessment / Treatment Plan:     1. Bilateral greater trochanteric bursitis. The patient is elected to go ahead and receive a cortisone injection today to the right hip. I discussed in detail the risks, benefits, and complications of an injection which include but are not limited to infection, skin reactions, hot swollen joints, and anaphylaxis with the patient. The patient verbalized good understanding and gave informed consent for the injection. The skin was prepped using sterile alcohol. A sterile 22-gauge needle was inserted into the area of maximal tenderness over the greater trochanter and a mixture of 4 mL of 2% Carbocaine, 4 mL of 0.25% Marcaine, and 80 mg of Depo-Medrol was injected under sterile technique. The needle was withdrawn and the puncture site sealed with a Band-Aid. Technique: Under sterile conditions a [employer] ultrasound unit with a variable frequency (6.0-15.0 MHz) linear transducer was used to localize the placement of a 22-gauge needle into the area of maximal tenderness over the greater trochanter. Findings: Successful needle placement for Hip injection. Final images were taken and saved for permanent record. The patient tolerated the injection well. The patient was instructed to call the office immediately if there is any pain, redness, warmth, fever, or chills. 2.  History of right total hip replacement    3. LEFT hip osteoarthritis      We discussed treatment options including attempting a greater trochanteric cortisone injection to the right hip.   We also discussed physical therapy and possible further evaluation of her lumbar spine to rule out a radicular cause for

## 2018-01-09 ENCOUNTER — OFFICE VISIT (OUTPATIENT)
Dept: INTERNAL MEDICINE CLINIC | Age: 83
End: 2018-01-09

## 2018-01-09 VITALS
HEIGHT: 66 IN | BODY MASS INDEX: 24.59 KG/M2 | SYSTOLIC BLOOD PRESSURE: 136 MMHG | DIASTOLIC BLOOD PRESSURE: 64 MMHG | WEIGHT: 153 LBS | HEART RATE: 97 BPM | OXYGEN SATURATION: 97 %

## 2018-01-09 DIAGNOSIS — Z23 NEED FOR DIPHTHERIA-TETANUS-PERTUSSIS (TDAP) VACCINE: ICD-10-CM

## 2018-01-09 DIAGNOSIS — I10 ESSENTIAL HYPERTENSION: ICD-10-CM

## 2018-01-09 DIAGNOSIS — M25.551 PAIN OF BOTH HIP JOINTS: ICD-10-CM

## 2018-01-09 DIAGNOSIS — E11.9 TYPE 2 DIABETES MELLITUS WITHOUT COMPLICATION, WITHOUT LONG-TERM CURRENT USE OF INSULIN (HCC): Primary | ICD-10-CM

## 2018-01-09 DIAGNOSIS — N32.81 OAB (OVERACTIVE BLADDER): ICD-10-CM

## 2018-01-09 DIAGNOSIS — E78.2 MIXED HYPERLIPIDEMIA: ICD-10-CM

## 2018-01-09 DIAGNOSIS — M25.552 PAIN OF BOTH HIP JOINTS: ICD-10-CM

## 2018-01-09 LAB — HBA1C MFR BLD: 7.7 %

## 2018-01-09 PROCEDURE — 1090F PRES/ABSN URINE INCON ASSESS: CPT | Performed by: FAMILY MEDICINE

## 2018-01-09 PROCEDURE — G8427 DOCREV CUR MEDS BY ELIG CLIN: HCPCS | Performed by: FAMILY MEDICINE

## 2018-01-09 PROCEDURE — 1036F TOBACCO NON-USER: CPT | Performed by: FAMILY MEDICINE

## 2018-01-09 PROCEDURE — G8484 FLU IMMUNIZE NO ADMIN: HCPCS | Performed by: FAMILY MEDICINE

## 2018-01-09 PROCEDURE — G8420 CALC BMI NORM PARAMETERS: HCPCS | Performed by: FAMILY MEDICINE

## 2018-01-09 PROCEDURE — G8400 PT W/DXA NO RESULTS DOC: HCPCS | Performed by: FAMILY MEDICINE

## 2018-01-09 PROCEDURE — 1123F ACP DISCUSS/DSCN MKR DOCD: CPT | Performed by: FAMILY MEDICINE

## 2018-01-09 PROCEDURE — 99214 OFFICE O/P EST MOD 30 MIN: CPT | Performed by: FAMILY MEDICINE

## 2018-01-09 PROCEDURE — 4040F PNEUMOC VAC/ADMIN/RCVD: CPT | Performed by: FAMILY MEDICINE

## 2018-01-09 PROCEDURE — 83036 HEMOGLOBIN GLYCOSYLATED A1C: CPT | Performed by: FAMILY MEDICINE

## 2018-01-09 ASSESSMENT — ENCOUNTER SYMPTOMS
NAUSEA: 0
BACK PAIN: 0
RHINORRHEA: 0
DIARRHEA: 0
COUGH: 0
SORE THROAT: 0
TROUBLE SWALLOWING: 0
CHOKING: 0
ABDOMINAL PAIN: 0
CONSTIPATION: 0
VOMITING: 0
WHEEZING: 0

## 2018-01-09 NOTE — PROGRESS NOTES
Nicky Crowley          Chief Complaint   Patient presents with    Diabetes    Hypertension       Diabetes     Hypertension       Doing fairly well, but complains of continued hip pain. Hard when she wakes up in the morning and first bears weight. Seeing ortho Dr Sanam Frederick, who thinks she has bilateral bursitis. She just had an injection into her right hip yesterday; waiting to see if it will be helpful. PT made things worse. Taking oxybutynin but not sure that it is helping; still frustrated by urinary frequency and urgency. Checks her blood sugars regularly; she forgot to bring her log of readings. No low blood sugars. Still eating ice cream but cut down on it, and has cut out candy and cake. Occ checks her BP at home, usually 120-130/80's      Current Outpatient Prescriptions on File Prior to Visit   Medication Sig Dispense Refill    oxybutynin (DITROPAN) 5 MG tablet Take 1 tablet by mouth 3 times daily 90 tablet 5    meloxicam (MOBIC) 15 MG tablet Take 1 tablet by mouth daily 30 tablet 3    atorvastatin (LIPITOR) 40 MG tablet Take 1 tablet by mouth daily 90 tablet 3    glucose blood VI test strips (MASON CONTOUR TEST) strip USE TO TEST ONCE DAILY. Dx E11.92 100 strip 3    MICROLET LANCETS MISC 1 each by Does not apply route daily Dx E11.92 100 each 3    KROGER LANCETS ULTRATHIN 30G MISC USE TO TEST BLOOD  SUGAR LEVELS ONCE DAILY 100 each 3    Glucos-Chond-Hyal Ac-Ca Fructo (MOVE FREE JOINT HEALTH ADVANCE PO) Take by mouth      amLODIPine (NORVASC) 5 MG tablet TAKE ONE TABLET BY MOUTH DAILY 90 tablet 3    glimepiride (AMARYL) 4 MG tablet TAKE ONE TABLET DAILY 90 tablet 3    Coenzyme Q10 (CO Q 10 PO) Take 1 tablet by mouth daily      KROGER LANCETS ULTRATHIN 30G MISC USE TO TEST BLOOD SUGAR LEVELS ONCE DAILY  Dx E11.9 100 each 3    Glucosamine-Chondroitin (MOVE FREE PO) Take  by mouth.  ibuprofen (ADVIL) 200 MG tablet Take 200 mg by mouth every 6 hours as needed.       Tetanus-Diphth-Acell Pertussis (BOOSTRIX) 5-2.5-18.5 LF-MCG/0.5 injection; Inject 0.5 mLs into the muscle once for 1 dose  Dispense: 0.5 mL; Refill: 0    Return in about 3 months (around 4/9/2018) for DM, HTN, HLD, OAB.

## 2018-01-23 DIAGNOSIS — I10 ESSENTIAL HYPERTENSION: ICD-10-CM

## 2018-01-23 RX ORDER — LISINOPRIL 20 MG/1
TABLET ORAL
Qty: 180 TABLET | Refills: 0 | Status: SHIPPED | OUTPATIENT
Start: 2018-01-23 | End: 2018-04-20 | Stop reason: SDUPTHER

## 2018-02-21 ENCOUNTER — TELEPHONE (OUTPATIENT)
Dept: INTERNAL MEDICINE CLINIC | Age: 83
End: 2018-02-21

## 2018-02-21 NOTE — TELEPHONE ENCOUNTER
Patient states she would like to speak with you regarding not feeling well. She thinks it is from taking all her medications at one time. She said she will also call and try to get an appointment in the morning and then hung up. I could not get any other information form her as she hung up on me.

## 2018-02-27 ENCOUNTER — TELEPHONE (OUTPATIENT)
Dept: INTERNAL MEDICINE CLINIC | Age: 83
End: 2018-02-27

## 2018-02-28 ENCOUNTER — OFFICE VISIT (OUTPATIENT)
Dept: INTERNAL MEDICINE CLINIC | Age: 83
End: 2018-02-28

## 2018-02-28 ENCOUNTER — HOSPITAL ENCOUNTER (OUTPATIENT)
Dept: GENERAL RADIOLOGY | Age: 83
Discharge: HOME | End: 2018-02-28
Attending: FAMILY MEDICINE | Admitting: FAMILY MEDICINE

## 2018-02-28 ENCOUNTER — TELEPHONE (OUTPATIENT)
Dept: INTERNAL MEDICINE CLINIC | Age: 83
End: 2018-02-28

## 2018-02-28 VITALS
HEART RATE: 80 BPM | DIASTOLIC BLOOD PRESSURE: 68 MMHG | WEIGHT: 155 LBS | OXYGEN SATURATION: 98 % | SYSTOLIC BLOOD PRESSURE: 138 MMHG | BODY MASS INDEX: 25.02 KG/M2

## 2018-02-28 DIAGNOSIS — M54.41 CHRONIC BILATERAL LOW BACK PAIN WITH BILATERAL SCIATICA: ICD-10-CM

## 2018-02-28 DIAGNOSIS — M51.36 DDD (DEGENERATIVE DISC DISEASE), LUMBAR: ICD-10-CM

## 2018-02-28 DIAGNOSIS — H35.30 MACULAR DEGENERATION: ICD-10-CM

## 2018-02-28 DIAGNOSIS — M54.42 CHRONIC BILATERAL LOW BACK PAIN WITH BILATERAL SCIATICA: ICD-10-CM

## 2018-02-28 DIAGNOSIS — R53.83 FATIGUE, UNSPECIFIED TYPE: Primary | ICD-10-CM

## 2018-02-28 DIAGNOSIS — G89.29 CHRONIC BILATERAL LOW BACK PAIN WITH BILATERAL SCIATICA: ICD-10-CM

## 2018-02-28 DIAGNOSIS — H40.9 GLAUCOMA OF BOTH EYES, UNSPECIFIED GLAUCOMA TYPE: ICD-10-CM

## 2018-02-28 DIAGNOSIS — N32.81 OAB (OVERACTIVE BLADDER): ICD-10-CM

## 2018-02-28 LAB
A/G RATIO: 1.2 (ref 1.1–2.2)
ALBUMIN SERPL-MCNC: 4.2 G/DL (ref 3.4–5)
ALP BLD-CCNC: 120 U/L (ref 40–129)
ALT SERPL-CCNC: 26 U/L (ref 10–40)
ANION GAP SERPL CALCULATED.3IONS-SCNC: 14 MMOL/L (ref 3–16)
AST SERPL-CCNC: 24 U/L (ref 15–37)
BASOPHILS ABSOLUTE: 0.1 K/UL (ref 0–0.2)
BASOPHILS RELATIVE PERCENT: 0.7 %
BILIRUB SERPL-MCNC: 0.6 MG/DL (ref 0–1)
BUN BLDV-MCNC: 20 MG/DL (ref 7–20)
CALCIUM SERPL-MCNC: 9.1 MG/DL (ref 8.3–10.6)
CHLORIDE BLD-SCNC: 101 MMOL/L (ref 99–110)
CO2: 26 MMOL/L (ref 21–32)
CREAT SERPL-MCNC: 0.8 MG/DL (ref 0.6–1.2)
EOSINOPHILS ABSOLUTE: 0.6 K/UL (ref 0–0.6)
EOSINOPHILS RELATIVE PERCENT: 5.9 %
GFR AFRICAN AMERICAN: >60
GFR NON-AFRICAN AMERICAN: >60
GLOBULIN: 3.6 G/DL
GLUCOSE BLD-MCNC: 142 MG/DL (ref 70–99)
HCT VFR BLD CALC: 35.5 % (ref 36–48)
HEMOGLOBIN: 11.7 G/DL (ref 12–16)
LYMPHOCYTES ABSOLUTE: 1.3 K/UL (ref 1–5.1)
LYMPHOCYTES RELATIVE PERCENT: 13.9 %
MCH RBC QN AUTO: 28.2 PG (ref 26–34)
MCHC RBC AUTO-ENTMCNC: 33 G/DL (ref 31–36)
MCV RBC AUTO: 85.5 FL (ref 80–100)
MONOCYTES ABSOLUTE: 0.8 K/UL (ref 0–1.3)
MONOCYTES RELATIVE PERCENT: 8.1 %
NEUTROPHILS ABSOLUTE: 6.7 K/UL (ref 1.7–7.7)
NEUTROPHILS RELATIVE PERCENT: 71.4 %
PDW BLD-RTO: 14.4 % (ref 12.4–15.4)
PLATELET # BLD: 248 K/UL (ref 135–450)
PMV BLD AUTO: 9.9 FL (ref 5–10.5)
POTASSIUM SERPL-SCNC: 5 MMOL/L (ref 3.5–5.1)
RBC # BLD: 4.15 M/UL (ref 4–5.2)
SODIUM BLD-SCNC: 141 MMOL/L (ref 136–145)
T4 FREE: 1 NG/DL (ref 0.9–1.8)
TOTAL PROTEIN: 7.8 G/DL (ref 6.4–8.2)
TSH REFLEX: 4.62 UIU/ML (ref 0.27–4.2)
WBC # BLD: 9.4 K/UL (ref 4–11)

## 2018-02-28 PROCEDURE — 1036F TOBACCO NON-USER: CPT | Performed by: FAMILY MEDICINE

## 2018-02-28 PROCEDURE — 99214 OFFICE O/P EST MOD 30 MIN: CPT | Performed by: FAMILY MEDICINE

## 2018-02-28 PROCEDURE — G8427 DOCREV CUR MEDS BY ELIG CLIN: HCPCS | Performed by: FAMILY MEDICINE

## 2018-02-28 PROCEDURE — 1090F PRES/ABSN URINE INCON ASSESS: CPT | Performed by: FAMILY MEDICINE

## 2018-02-28 PROCEDURE — G8484 FLU IMMUNIZE NO ADMIN: HCPCS | Performed by: FAMILY MEDICINE

## 2018-02-28 PROCEDURE — 4040F PNEUMOC VAC/ADMIN/RCVD: CPT | Performed by: FAMILY MEDICINE

## 2018-02-28 PROCEDURE — 1123F ACP DISCUSS/DSCN MKR DOCD: CPT | Performed by: FAMILY MEDICINE

## 2018-02-28 PROCEDURE — G8400 PT W/DXA NO RESULTS DOC: HCPCS | Performed by: FAMILY MEDICINE

## 2018-02-28 PROCEDURE — G8417 CALC BMI ABV UP PARAM F/U: HCPCS | Performed by: FAMILY MEDICINE

## 2018-02-28 ASSESSMENT — PATIENT HEALTH QUESTIONNAIRE - PHQ9
SUM OF ALL RESPONSES TO PHQ QUESTIONS 1-9: 0
SUM OF ALL RESPONSES TO PHQ9 QUESTIONS 1 & 2: 0
2. FEELING DOWN, DEPRESSED OR HOPELESS: 0
1. LITTLE INTEREST OR PLEASURE IN DOING THINGS: 0

## 2018-02-28 NOTE — TELEPHONE ENCOUNTER
Dianna Wan, calling to have a new script to them for Tawana's test strips with a dx code added to the script.

## 2018-03-02 ASSESSMENT — ENCOUNTER SYMPTOMS
CONSTIPATION: 0
BACK PAIN: 1
SORE THROAT: 0
COUGH: 0
VOMITING: 0
WHEEZING: 0
ABDOMINAL PAIN: 0
CHOKING: 0
DIARRHEA: 0
RHINORRHEA: 0
TROUBLE SWALLOWING: 0
NAUSEA: 0

## 2018-03-05 ENCOUNTER — TELEPHONE (OUTPATIENT)
Dept: INTERNAL MEDICINE CLINIC | Age: 83
End: 2018-03-05

## 2018-03-05 NOTE — TELEPHONE ENCOUNTER
[employer] has denied Tawana's test strips under Medicare Part D; all diabetic supplies should be billed under Medicare Part B. Please advise patient to discuss this with the pharmacy.

## 2018-03-07 PROBLEM — E03.8 SUBCLINICAL HYPOTHYROIDISM: Status: ACTIVE | Noted: 2018-03-07

## 2018-04-20 DIAGNOSIS — I10 ESSENTIAL HYPERTENSION: ICD-10-CM

## 2018-04-20 RX ORDER — LISINOPRIL 20 MG/1
TABLET ORAL
Qty: 180 TABLET | Refills: 0 | Status: SHIPPED | OUTPATIENT
Start: 2018-04-20 | End: 2018-07-20 | Stop reason: SDUPTHER

## 2018-04-26 ENCOUNTER — OFFICE VISIT (OUTPATIENT)
Dept: ORTHOPEDIC SURGERY | Age: 83
End: 2018-04-26

## 2018-04-26 VITALS
DIASTOLIC BLOOD PRESSURE: 83 MMHG | HEART RATE: 79 BPM | BODY MASS INDEX: 24.91 KG/M2 | HEIGHT: 66 IN | WEIGHT: 154.98 LBS | SYSTOLIC BLOOD PRESSURE: 117 MMHG

## 2018-04-26 DIAGNOSIS — M54.5 LOW BACK PAIN, UNSPECIFIED BACK PAIN LATERALITY, UNSPECIFIED CHRONICITY, WITH SCIATICA PRESENCE UNSPECIFIED: Primary | ICD-10-CM

## 2018-04-26 PROCEDURE — 1036F TOBACCO NON-USER: CPT | Performed by: PHYSICAL MEDICINE & REHABILITATION

## 2018-04-26 PROCEDURE — 99203 OFFICE O/P NEW LOW 30 MIN: CPT | Performed by: PHYSICAL MEDICINE & REHABILITATION

## 2018-04-26 PROCEDURE — 1090F PRES/ABSN URINE INCON ASSESS: CPT | Performed by: PHYSICAL MEDICINE & REHABILITATION

## 2018-04-26 PROCEDURE — G8417 CALC BMI ABV UP PARAM F/U: HCPCS | Performed by: PHYSICAL MEDICINE & REHABILITATION

## 2018-04-26 PROCEDURE — 4040F PNEUMOC VAC/ADMIN/RCVD: CPT | Performed by: PHYSICAL MEDICINE & REHABILITATION

## 2018-04-26 PROCEDURE — G8427 DOCREV CUR MEDS BY ELIG CLIN: HCPCS | Performed by: PHYSICAL MEDICINE & REHABILITATION

## 2018-04-26 PROCEDURE — 1123F ACP DISCUSS/DSCN MKR DOCD: CPT | Performed by: PHYSICAL MEDICINE & REHABILITATION

## 2018-04-26 PROCEDURE — G8400 PT W/DXA NO RESULTS DOC: HCPCS | Performed by: PHYSICAL MEDICINE & REHABILITATION

## 2018-05-09 DIAGNOSIS — I10 ESSENTIAL HYPERTENSION: ICD-10-CM

## 2018-05-09 RX ORDER — AMLODIPINE BESYLATE 5 MG/1
TABLET ORAL
Qty: 90 TABLET | Refills: 2 | Status: SHIPPED | OUTPATIENT
Start: 2018-05-09 | End: 2019-02-11 | Stop reason: SDUPTHER

## 2018-05-17 ENCOUNTER — OFFICE VISIT (OUTPATIENT)
Dept: INTERNAL MEDICINE CLINIC | Age: 83
End: 2018-05-17

## 2018-05-17 VITALS
OXYGEN SATURATION: 97 % | BODY MASS INDEX: 25.03 KG/M2 | WEIGHT: 155 LBS | HEART RATE: 94 BPM | SYSTOLIC BLOOD PRESSURE: 120 MMHG | DIASTOLIC BLOOD PRESSURE: 62 MMHG

## 2018-05-17 DIAGNOSIS — E11.9 TYPE 2 DIABETES MELLITUS WITHOUT COMPLICATION, WITHOUT LONG-TERM CURRENT USE OF INSULIN (HCC): Primary | ICD-10-CM

## 2018-05-17 DIAGNOSIS — I10 ESSENTIAL HYPERTENSION: ICD-10-CM

## 2018-05-17 DIAGNOSIS — E78.2 MIXED HYPERLIPIDEMIA: ICD-10-CM

## 2018-05-17 LAB — HBA1C MFR BLD: 8.2 %

## 2018-05-17 PROCEDURE — 4040F PNEUMOC VAC/ADMIN/RCVD: CPT | Performed by: FAMILY MEDICINE

## 2018-05-17 PROCEDURE — G8417 CALC BMI ABV UP PARAM F/U: HCPCS | Performed by: FAMILY MEDICINE

## 2018-05-17 PROCEDURE — 1090F PRES/ABSN URINE INCON ASSESS: CPT | Performed by: FAMILY MEDICINE

## 2018-05-17 PROCEDURE — 1036F TOBACCO NON-USER: CPT | Performed by: FAMILY MEDICINE

## 2018-05-17 PROCEDURE — 99214 OFFICE O/P EST MOD 30 MIN: CPT | Performed by: FAMILY MEDICINE

## 2018-05-17 PROCEDURE — G8427 DOCREV CUR MEDS BY ELIG CLIN: HCPCS | Performed by: FAMILY MEDICINE

## 2018-05-17 PROCEDURE — 83036 HEMOGLOBIN GLYCOSYLATED A1C: CPT | Performed by: FAMILY MEDICINE

## 2018-05-17 PROCEDURE — 1123F ACP DISCUSS/DSCN MKR DOCD: CPT | Performed by: FAMILY MEDICINE

## 2018-05-17 PROCEDURE — G8400 PT W/DXA NO RESULTS DOC: HCPCS | Performed by: FAMILY MEDICINE

## 2018-05-17 RX ORDER — GLIMEPIRIDE 4 MG/1
4 TABLET ORAL DAILY
Qty: 90 TABLET | Refills: 3 | Status: CANCELLED | OUTPATIENT
Start: 2018-05-17

## 2018-05-17 ASSESSMENT — ENCOUNTER SYMPTOMS
NAUSEA: 0
VOMITING: 0
ABDOMINAL PAIN: 0
RHINORRHEA: 0
SORE THROAT: 0
CONSTIPATION: 0
DIARRHEA: 0
BACK PAIN: 0
WHEEZING: 0
TROUBLE SWALLOWING: 0
CHOKING: 0
COUGH: 0

## 2018-06-16 ENCOUNTER — OFFICE VISIT (OUTPATIENT)
Dept: ORTHOPEDIC SURGERY | Age: 83
End: 2018-06-16

## 2018-06-16 VITALS
DIASTOLIC BLOOD PRESSURE: 62 MMHG | SYSTOLIC BLOOD PRESSURE: 120 MMHG | HEART RATE: 94 BPM | HEIGHT: 66 IN | BODY MASS INDEX: 24.91 KG/M2 | WEIGHT: 155 LBS

## 2018-06-16 DIAGNOSIS — M17.12 PRIMARY OSTEOARTHRITIS OF LEFT KNEE: ICD-10-CM

## 2018-06-16 DIAGNOSIS — S83.412A SPRAIN OF MEDIAL COLLATERAL LIGAMENT OF LEFT KNEE, INITIAL ENCOUNTER: ICD-10-CM

## 2018-06-16 DIAGNOSIS — M25.562 LEFT KNEE PAIN, UNSPECIFIED CHRONICITY: Primary | ICD-10-CM

## 2018-06-16 PROCEDURE — L1812 KO ELASTIC W/JOINTS PRE OTS: HCPCS | Performed by: NURSE PRACTITIONER

## 2018-06-16 PROCEDURE — 99213 OFFICE O/P EST LOW 20 MIN: CPT | Performed by: NURSE PRACTITIONER

## 2018-06-20 ENCOUNTER — OFFICE VISIT (OUTPATIENT)
Dept: ORTHOPEDIC SURGERY | Age: 83
End: 2018-06-20

## 2018-06-20 VITALS — HEIGHT: 66 IN | BODY MASS INDEX: 24.91 KG/M2 | WEIGHT: 154.98 LBS

## 2018-06-20 DIAGNOSIS — M17.12 PRIMARY OSTEOARTHRITIS OF LEFT KNEE: Primary | ICD-10-CM

## 2018-06-20 PROCEDURE — 4040F PNEUMOC VAC/ADMIN/RCVD: CPT | Performed by: ORTHOPAEDIC SURGERY

## 2018-06-20 PROCEDURE — G8417 CALC BMI ABV UP PARAM F/U: HCPCS | Performed by: ORTHOPAEDIC SURGERY

## 2018-06-20 PROCEDURE — G8400 PT W/DXA NO RESULTS DOC: HCPCS | Performed by: ORTHOPAEDIC SURGERY

## 2018-06-20 PROCEDURE — 99213 OFFICE O/P EST LOW 20 MIN: CPT | Performed by: ORTHOPAEDIC SURGERY

## 2018-06-20 PROCEDURE — G8427 DOCREV CUR MEDS BY ELIG CLIN: HCPCS | Performed by: ORTHOPAEDIC SURGERY

## 2018-06-20 PROCEDURE — 1036F TOBACCO NON-USER: CPT | Performed by: ORTHOPAEDIC SURGERY

## 2018-06-20 PROCEDURE — 1090F PRES/ABSN URINE INCON ASSESS: CPT | Performed by: ORTHOPAEDIC SURGERY

## 2018-06-20 PROCEDURE — 20611 DRAIN/INJ JOINT/BURSA W/US: CPT | Performed by: ORTHOPAEDIC SURGERY

## 2018-06-20 PROCEDURE — 1123F ACP DISCUSS/DSCN MKR DOCD: CPT | Performed by: ORTHOPAEDIC SURGERY

## 2018-07-20 DIAGNOSIS — I10 ESSENTIAL HYPERTENSION: ICD-10-CM

## 2018-07-20 RX ORDER — LISINOPRIL 20 MG/1
TABLET ORAL
Qty: 180 TABLET | Refills: 3 | Status: ON HOLD | OUTPATIENT
Start: 2018-07-20 | End: 2019-03-07 | Stop reason: HOSPADM

## 2018-07-31 RX ORDER — LANCETS
EACH MISCELLANEOUS
Qty: 100 EACH | Refills: 3 | Status: SHIPPED | OUTPATIENT
Start: 2018-07-31 | End: 2018-08-01 | Stop reason: SDUPTHER

## 2018-08-01 DIAGNOSIS — E11.9 DIABETES MELLITUS WITHOUT COMPLICATION (HCC): Primary | ICD-10-CM

## 2018-08-01 RX ORDER — LANCETS
EACH MISCELLANEOUS
Qty: 100 EACH | Refills: 3 | Status: ON HOLD | OUTPATIENT
Start: 2018-08-01 | End: 2019-08-24 | Stop reason: HOSPADM

## 2018-08-01 NOTE — TELEPHONE ENCOUNTER
Gabriela sent a fax saying they need the lancets attached to a Dx code so they can bill medicare. I pended the lancets with the Dx code.

## 2018-08-14 DIAGNOSIS — R35.0 FREQUENCY OF URINATION: ICD-10-CM

## 2018-08-14 DIAGNOSIS — N32.81 OAB (OVERACTIVE BLADDER): ICD-10-CM

## 2018-08-15 RX ORDER — OXYBUTYNIN CHLORIDE 5 MG/1
TABLET ORAL
Qty: 90 TABLET | Refills: 4 | Status: SHIPPED | OUTPATIENT
Start: 2018-08-15 | End: 2019-03-19 | Stop reason: SDUPTHER

## 2018-08-15 NOTE — TELEPHONE ENCOUNTER
Refill request for oxybutnin medication.      Name of Evan Payment plugin    Last visit - 5/17/18     Pending visit - 8/17/18    Last refill -1/3/18  5 refills

## 2018-08-17 ENCOUNTER — OFFICE VISIT (OUTPATIENT)
Dept: INTERNAL MEDICINE CLINIC | Age: 83
End: 2018-08-17

## 2018-08-17 VITALS
BODY MASS INDEX: 24.71 KG/M2 | DIASTOLIC BLOOD PRESSURE: 60 MMHG | WEIGHT: 153 LBS | OXYGEN SATURATION: 98 % | HEART RATE: 86 BPM | SYSTOLIC BLOOD PRESSURE: 114 MMHG

## 2018-08-17 DIAGNOSIS — I10 ESSENTIAL HYPERTENSION: ICD-10-CM

## 2018-08-17 DIAGNOSIS — E78.2 MIXED HYPERLIPIDEMIA: ICD-10-CM

## 2018-08-17 DIAGNOSIS — G47.9 SLEEP DIFFICULTIES: ICD-10-CM

## 2018-08-17 DIAGNOSIS — E11.9 TYPE 2 DIABETES MELLITUS WITHOUT COMPLICATION, WITHOUT LONG-TERM CURRENT USE OF INSULIN (HCC): Primary | ICD-10-CM

## 2018-08-17 DIAGNOSIS — N32.81 OAB (OVERACTIVE BLADDER): ICD-10-CM

## 2018-08-17 DIAGNOSIS — E03.8 SUBCLINICAL HYPOTHYROIDISM: ICD-10-CM

## 2018-08-17 LAB — HBA1C MFR BLD: 8.2 %

## 2018-08-17 PROCEDURE — G8420 CALC BMI NORM PARAMETERS: HCPCS | Performed by: FAMILY MEDICINE

## 2018-08-17 PROCEDURE — G8427 DOCREV CUR MEDS BY ELIG CLIN: HCPCS | Performed by: FAMILY MEDICINE

## 2018-08-17 PROCEDURE — 1090F PRES/ABSN URINE INCON ASSESS: CPT | Performed by: FAMILY MEDICINE

## 2018-08-17 PROCEDURE — 1101F PT FALLS ASSESS-DOCD LE1/YR: CPT | Performed by: FAMILY MEDICINE

## 2018-08-17 PROCEDURE — 4040F PNEUMOC VAC/ADMIN/RCVD: CPT | Performed by: FAMILY MEDICINE

## 2018-08-17 PROCEDURE — 1123F ACP DISCUSS/DSCN MKR DOCD: CPT | Performed by: FAMILY MEDICINE

## 2018-08-17 PROCEDURE — 1036F TOBACCO NON-USER: CPT | Performed by: FAMILY MEDICINE

## 2018-08-17 PROCEDURE — 99214 OFFICE O/P EST MOD 30 MIN: CPT | Performed by: FAMILY MEDICINE

## 2018-08-17 PROCEDURE — 83036 HEMOGLOBIN GLYCOSYLATED A1C: CPT | Performed by: FAMILY MEDICINE

## 2018-08-17 PROCEDURE — G8400 PT W/DXA NO RESULTS DOC: HCPCS | Performed by: FAMILY MEDICINE

## 2018-08-17 RX ORDER — LANOLIN ALCOHOL/MO/W.PET/CERES
3 CREAM (GRAM) TOPICAL NIGHTLY PRN
Qty: 30 TABLET | Refills: 2 | Status: SHIPPED | OUTPATIENT
Start: 2018-08-17 | End: 2019-05-23

## 2018-08-17 RX ORDER — METFORMIN HYDROCHLORIDE 500 MG/1
500 TABLET, EXTENDED RELEASE ORAL
Qty: 30 TABLET | Refills: 3 | Status: SHIPPED | OUTPATIENT
Start: 2018-08-17 | End: 2018-11-20 | Stop reason: SDUPTHER

## 2018-08-17 ASSESSMENT — ENCOUNTER SYMPTOMS
DIARRHEA: 0
VOMITING: 0
ABDOMINAL PAIN: 0
WHEEZING: 0
TROUBLE SWALLOWING: 0
CHOKING: 0
COUGH: 0
RHINORRHEA: 0
CONSTIPATION: 0
NAUSEA: 0
SORE THROAT: 0

## 2018-08-17 ASSESSMENT — PATIENT HEALTH QUESTIONNAIRE - PHQ9
SUM OF ALL RESPONSES TO PHQ QUESTIONS 1-9: 0
SUM OF ALL RESPONSES TO PHQ QUESTIONS 1-9: 0
1. LITTLE INTEREST OR PLEASURE IN DOING THINGS: 0
SUM OF ALL RESPONSES TO PHQ9 QUESTIONS 1 & 2: 0
2. FEELING DOWN, DEPRESSED OR HOPELESS: 0

## 2018-08-17 NOTE — PROGRESS NOTES
LEVELS ONCE DAILY 100 each 3    Glucos-Chond-Hyal Ac-Ca Fructo (MOVE FREE JOINT HEALTH ADVANCE PO) Take by mouth      Coenzyme Q10 (CO Q 10 PO) Take 1 tablet by mouth daily      KROGER LANCETS ULTRATHIN 30G MISC USE TO TEST BLOOD SUGAR LEVELS ONCE DAILY  Dx E11.9 100 each 3    ibuprofen (ADVIL) 200 MG tablet Take 200 mg by mouth every 6 hours as needed.          No current facility-administered medications on file prior to visit. Review of Systems   Constitutional: Positive for fatigue (daytime sleepiness). Negative for activity change, appetite change, chills, fever and unexpected weight change. HENT: Negative for congestion, nosebleeds, postnasal drip, rhinorrhea, sneezing, sore throat and trouble swallowing. Eyes: Negative for visual disturbance. Respiratory: Negative for cough, choking and wheezing. Cardiovascular: Negative for leg swelling. Gastrointestinal: Negative for abdominal pain, constipation, diarrhea, nausea and vomiting. Genitourinary: Negative for difficulty urinating, dysuria, frequency, hematuria, vaginal discharge and vaginal pain. Musculoskeletal: Positive for arthralgias (knees), back pain (improved) and gait problem (due to replaced hip and scoliotic spine). Negative for neck stiffness. Skin: Negative for rash. Neurological: Negative for numbness. Psychiatric/Behavioral: Positive for sleep disturbance. Negative for dysphoric mood. Physical Exam   Constitutional: She is oriented to person, place, and time. She appears well-developed and well-nourished. No distress. pleasant   HENT:   Head: Normocephalic and atraumatic. Nose: Nose normal.   Mouth/Throat: Oropharynx is clear and moist.   Eyes: Conjunctivae and EOM are normal. Right eye exhibits no discharge. Left eye exhibits no discharge. Neck: Normal range of motion. Neck supple. No thyromegaly present. Cardiovascular: Normal rate and regular rhythm.     Murmur (systolic)

## 2018-08-20 ENCOUNTER — TELEPHONE (OUTPATIENT)
Dept: INTERNAL MEDICINE CLINIC | Age: 83
End: 2018-08-20

## 2018-08-20 ASSESSMENT — ENCOUNTER SYMPTOMS: BACK PAIN: 1

## 2018-08-27 ENCOUNTER — HOSPITAL ENCOUNTER (OUTPATIENT)
Age: 83
Discharge: HOME | End: 2018-08-27
Payer: MEDICARE

## 2018-08-27 DIAGNOSIS — E11.9 TYPE 2 DIABETES MELLITUS WITHOUT COMPLICATION, WITHOUT LONG-TERM CURRENT USE OF INSULIN (HCC): ICD-10-CM

## 2018-08-27 DIAGNOSIS — E03.8 SUBCLINICAL HYPOTHYROIDISM: ICD-10-CM

## 2018-08-27 DIAGNOSIS — E78.2 MIXED HYPERLIPIDEMIA: ICD-10-CM

## 2018-08-27 LAB
A/G RATIO: 1.1 (ref 1.1–2.2)
ALBUMIN SERPL-MCNC: 4.2 G/DL (ref 3.4–5)
ALP BLD-CCNC: 122 U/L (ref 40–129)
ALT SERPL-CCNC: 26 U/L (ref 10–40)
ANION GAP SERPL CALCULATED.3IONS-SCNC: 14 MMOL/L (ref 3–16)
AST SERPL-CCNC: 24 U/L (ref 15–37)
BILIRUB SERPL-MCNC: 0.6 MG/DL (ref 0–1)
BUN BLDV-MCNC: 23 MG/DL (ref 7–20)
CALCIUM SERPL-MCNC: 9.7 MG/DL (ref 8.3–10.6)
CHLORIDE BLD-SCNC: 99 MMOL/L (ref 99–110)
CHOLESTEROL, TOTAL: 169 MG/DL (ref 0–199)
CO2: 25 MMOL/L (ref 21–32)
CREAT SERPL-MCNC: 1 MG/DL (ref 0.6–1.2)
GFR AFRICAN AMERICAN: >60
GFR NON-AFRICAN AMERICAN: 53
GLOBULIN: 3.8 G/DL
GLUCOSE BLD-MCNC: 185 MG/DL (ref 70–99)
HDLC SERPL-MCNC: 45 MG/DL (ref 40–60)
LDL CHOLESTEROL CALCULATED: 74 MG/DL
POTASSIUM SERPL-SCNC: 5.3 MMOL/L (ref 3.5–5.1)
SODIUM BLD-SCNC: 138 MMOL/L (ref 136–145)
T4 FREE: 1 NG/DL (ref 0.9–1.8)
TOTAL PROTEIN: 8 G/DL (ref 6.4–8.2)
TRIGL SERPL-MCNC: 252 MG/DL (ref 0–150)
TSH REFLEX: 4.6 UIU/ML (ref 0.27–4.2)
VLDLC SERPL CALC-MCNC: 50 MG/DL

## 2018-08-27 PROCEDURE — 84439 ASSAY OF FREE THYROXINE: CPT

## 2018-08-27 PROCEDURE — 36415 COLL VENOUS BLD VENIPUNCTURE: CPT

## 2018-08-27 PROCEDURE — 80061 LIPID PANEL: CPT

## 2018-08-27 PROCEDURE — 80053 COMPREHEN METABOLIC PANEL: CPT

## 2018-08-27 PROCEDURE — 84443 ASSAY THYROID STIM HORMONE: CPT

## 2018-08-29 NOTE — PROGRESS NOTES
Please call Pt with recent bloodwork results:    CMP with glu 185, K+ slightly up at 5.3  Lipids are improved (LDL 74 from 120)  Thyroid hormone levels are stable     No med changes, continue on atorvastatin  Will recheck K+ with next blood draw; avoid high K+ foods (bananas, kiwi)

## 2018-08-30 ENCOUNTER — TELEPHONE (OUTPATIENT)
Dept: INTERNAL MEDICINE CLINIC | Age: 83
End: 2018-08-30

## 2018-08-30 DIAGNOSIS — R35.0 URINARY FREQUENCY: Primary | ICD-10-CM

## 2018-10-03 ENCOUNTER — TELEPHONE (OUTPATIENT)
Dept: INTERNAL MEDICINE CLINIC | Age: 83
End: 2018-10-03

## 2018-11-06 LAB — DIABETIC RETINOPATHY: NEGATIVE

## 2018-11-20 ENCOUNTER — OFFICE VISIT (OUTPATIENT)
Dept: INTERNAL MEDICINE CLINIC | Age: 83
End: 2018-11-20
Payer: MEDICARE

## 2018-11-20 VITALS
BODY MASS INDEX: 24.71 KG/M2 | OXYGEN SATURATION: 92 % | DIASTOLIC BLOOD PRESSURE: 62 MMHG | SYSTOLIC BLOOD PRESSURE: 122 MMHG | HEART RATE: 97 BPM | WEIGHT: 153 LBS

## 2018-11-20 DIAGNOSIS — E03.8 SUBCLINICAL HYPOTHYROIDISM: ICD-10-CM

## 2018-11-20 DIAGNOSIS — I10 ESSENTIAL HYPERTENSION: ICD-10-CM

## 2018-11-20 DIAGNOSIS — E78.2 MIXED HYPERLIPIDEMIA: ICD-10-CM

## 2018-11-20 DIAGNOSIS — Z23 FLU VACCINE NEED: ICD-10-CM

## 2018-11-20 DIAGNOSIS — R06.02 SOB (SHORTNESS OF BREATH): ICD-10-CM

## 2018-11-20 DIAGNOSIS — E11.9 TYPE 2 DIABETES MELLITUS WITHOUT COMPLICATION, WITHOUT LONG-TERM CURRENT USE OF INSULIN (HCC): Primary | ICD-10-CM

## 2018-11-20 DIAGNOSIS — N32.81 OAB (OVERACTIVE BLADDER): ICD-10-CM

## 2018-11-20 LAB — HBA1C MFR BLD: 8.8 %

## 2018-11-20 PROCEDURE — G8400 PT W/DXA NO RESULTS DOC: HCPCS | Performed by: FAMILY MEDICINE

## 2018-11-20 PROCEDURE — G8482 FLU IMMUNIZE ORDER/ADMIN: HCPCS | Performed by: FAMILY MEDICINE

## 2018-11-20 PROCEDURE — 1090F PRES/ABSN URINE INCON ASSESS: CPT | Performed by: FAMILY MEDICINE

## 2018-11-20 PROCEDURE — 1101F PT FALLS ASSESS-DOCD LE1/YR: CPT | Performed by: FAMILY MEDICINE

## 2018-11-20 PROCEDURE — G8427 DOCREV CUR MEDS BY ELIG CLIN: HCPCS | Performed by: FAMILY MEDICINE

## 2018-11-20 PROCEDURE — 99214 OFFICE O/P EST MOD 30 MIN: CPT | Performed by: FAMILY MEDICINE

## 2018-11-20 PROCEDURE — 1123F ACP DISCUSS/DSCN MKR DOCD: CPT | Performed by: FAMILY MEDICINE

## 2018-11-20 PROCEDURE — G0008 ADMIN INFLUENZA VIRUS VAC: HCPCS | Performed by: FAMILY MEDICINE

## 2018-11-20 PROCEDURE — 1036F TOBACCO NON-USER: CPT | Performed by: FAMILY MEDICINE

## 2018-11-20 PROCEDURE — 4040F PNEUMOC VAC/ADMIN/RCVD: CPT | Performed by: FAMILY MEDICINE

## 2018-11-20 PROCEDURE — 90662 IIV NO PRSV INCREASED AG IM: CPT | Performed by: FAMILY MEDICINE

## 2018-11-20 PROCEDURE — G8420 CALC BMI NORM PARAMETERS: HCPCS | Performed by: FAMILY MEDICINE

## 2018-11-20 PROCEDURE — 83036 HEMOGLOBIN GLYCOSYLATED A1C: CPT | Performed by: FAMILY MEDICINE

## 2018-11-20 RX ORDER — METFORMIN HYDROCHLORIDE 500 MG/1
500 TABLET, EXTENDED RELEASE ORAL
Qty: 30 TABLET | Refills: 3 | Status: SHIPPED | OUTPATIENT
Start: 2018-11-20 | End: 2019-03-17 | Stop reason: SDUPTHER

## 2018-11-20 ASSESSMENT — ENCOUNTER SYMPTOMS
COUGH: 0
DIARRHEA: 0
CONSTIPATION: 0
VOMITING: 0
RHINORRHEA: 0
NAUSEA: 0
TROUBLE SWALLOWING: 0
WHEEZING: 0
CHOKING: 0
SORE THROAT: 0
ABDOMINAL PAIN: 0
SHORTNESS OF BREATH: 1
BACK PAIN: 1

## 2018-11-20 NOTE — PROGRESS NOTES
Vaccine Information Sheet, \"Influenza - Inactivated\"  given to Miracle Dawkins, or parent/legal guardian of  Miracle Dawkins and verbalized understanding. Patient responses:    Have you ever had a reaction to a flu vaccine? No  Are you able to eat eggs without adverse effects? Yes  Do you have any current illness? No  Have you ever had [place] Syndrome? No    Flu vaccine given per order. Please see immunization tab.

## 2018-11-20 NOTE — PROGRESS NOTES
Napoleon Henson          Chief Complaint   Patient presents with    Hypertension    Diabetes       HPI    Doing ok, but c/o some SOB when she walks any long distances. Mild swelling in her legs  She is not getting much exercise or activity in; sits at home mostly. Sleeps in her recliner (due to joint and back issues), sitting up partially, and keeps her TV on all night. Joint pains (back and hip pain) have improved, but knees continue to give her trouble. Doesn't feel as steady on her feet. DM: Checks her blood sugars occasionally; she forgot to bring her log of readings. No low blood sugars. Not eating a low carb diet; loves sweets. C/o occ numb feeling in the bottom of her right foot    Occ checks her BP at home, usually 120-130/80's    Taking oxybutynin again    I personally reviewed and updated patient's past medical history, past surgical history, family history, allergies, and social history as captured in the relevant section of patient's chart. Current Outpatient Prescriptions on File Prior to Visit   Medication Sig Dispense Refill    glimepiride (AMARYL) 4 MG tablet Take 1 tablet by mouth daily 90 tablet 3    amLODIPine (NORVASC) 5 MG tablet TAKE ONE TABLET BY MOUTH DAILY 90 tablet 2    lisinopril (PRINIVIL;ZESTRIL) 20 MG tablet TAKE ONE TABLET BY MOUTH TWICE A  tablet 0    glucose blood VI test strips (MASON CONTOUR TEST) strip USE TO TEST BLOOD SUGAR DAILY. Dx E11.9 100 strip 4    oxybutynin (DITROPAN) 5 MG tablet Take 1 tablet by mouth 3 times daily 90 tablet 5    atorvastatin (LIPITOR) 40 MG tablet Take 1 tablet by mouth daily 90 tablet 3    glucose blood VI test strips (MASON CONTOUR TEST) strip USE TO TEST ONCE DAILY.  Dx E11.92 100 strip 3    MICROLET LANCETS MISC 1 each by Does not apply route daily Dx E11.92 100 each 3    KROGER LANCETS ULTRATHIN 30G MISC USE TO TEST BLOOD  SUGAR LEVELS ONCE DAILY 100 each 3    Glucos-Chond-Hyal Ac-Ca Fructo (MOVE FREE JOINT

## 2018-11-20 NOTE — PATIENT INSTRUCTIONS
Check into whether you are taking metformin 500 mg daily     Return tomorrow for bloodwork in the lab downstairs

## 2018-11-21 ENCOUNTER — HOSPITAL ENCOUNTER (OUTPATIENT)
Age: 83
Discharge: HOME | End: 2018-11-21
Payer: MEDICARE

## 2018-11-21 DIAGNOSIS — E11.9 TYPE 2 DIABETES MELLITUS WITHOUT COMPLICATION, WITHOUT LONG-TERM CURRENT USE OF INSULIN (HCC): ICD-10-CM

## 2018-11-21 DIAGNOSIS — R06.02 SOB (SHORTNESS OF BREATH): ICD-10-CM

## 2018-11-21 LAB
ANION GAP SERPL CALCULATED.3IONS-SCNC: 11 MMOL/L (ref 3–16)
BUN BLDV-MCNC: 21 MG/DL (ref 7–20)
CALCIUM SERPL-MCNC: 9.4 MG/DL (ref 8.3–10.6)
CHLORIDE BLD-SCNC: 104 MMOL/L (ref 99–110)
CO2: 25 MMOL/L (ref 21–32)
CREAT SERPL-MCNC: 1 MG/DL (ref 0.6–1.2)
GFR AFRICAN AMERICAN: >60
GFR NON-AFRICAN AMERICAN: 53
GLUCOSE BLD-MCNC: 130 MG/DL (ref 70–99)
POTASSIUM SERPL-SCNC: 4.7 MMOL/L (ref 3.5–5.1)
PRO-BNP: 318 PG/ML (ref 0–449)
SODIUM BLD-SCNC: 140 MMOL/L (ref 136–145)

## 2018-11-21 PROCEDURE — 83880 ASSAY OF NATRIURETIC PEPTIDE: CPT

## 2018-11-21 PROCEDURE — 36415 COLL VENOUS BLD VENIPUNCTURE: CPT

## 2018-11-21 PROCEDURE — 80048 BASIC METABOLIC PNL TOTAL CA: CPT

## 2018-11-21 NOTE — PROGRESS NOTES
Please call Pt with recent bloodwork results:    BMP is ok  BNP is normal (no sign of CHF as a cause of her shortness of breath)

## 2019-01-29 LAB — DIABETIC RETINOPATHY: NEGATIVE

## 2019-02-11 DIAGNOSIS — I10 ESSENTIAL HYPERTENSION: ICD-10-CM

## 2019-02-11 RX ORDER — AMLODIPINE BESYLATE 5 MG/1
TABLET ORAL
Qty: 90 TABLET | Refills: 0 | Status: ON HOLD | OUTPATIENT
Start: 2019-02-11 | End: 2019-02-28 | Stop reason: HOSPADM

## 2019-02-19 ENCOUNTER — OFFICE VISIT (OUTPATIENT)
Dept: INTERNAL MEDICINE CLINIC | Age: 84
End: 2019-02-19
Payer: MEDICARE

## 2019-02-19 ENCOUNTER — TELEPHONE (OUTPATIENT)
Dept: INTERNAL MEDICINE CLINIC | Age: 84
End: 2019-02-19

## 2019-02-19 VITALS
OXYGEN SATURATION: 96 % | WEIGHT: 144 LBS | SYSTOLIC BLOOD PRESSURE: 126 MMHG | BODY MASS INDEX: 23.99 KG/M2 | HEART RATE: 88 BPM | HEIGHT: 65 IN | DIASTOLIC BLOOD PRESSURE: 64 MMHG

## 2019-02-19 DIAGNOSIS — S31.819A WOUND OF RIGHT BUTTOCK, INITIAL ENCOUNTER: ICD-10-CM

## 2019-02-19 DIAGNOSIS — R26.81 UNSTABLE GAIT: ICD-10-CM

## 2019-02-19 DIAGNOSIS — E78.2 MIXED HYPERLIPIDEMIA: ICD-10-CM

## 2019-02-19 DIAGNOSIS — E03.8 SUBCLINICAL HYPOTHYROIDISM: ICD-10-CM

## 2019-02-19 DIAGNOSIS — I10 ESSENTIAL HYPERTENSION: ICD-10-CM

## 2019-02-19 DIAGNOSIS — E11.9 TYPE 2 DIABETES MELLITUS WITHOUT COMPLICATION, WITHOUT LONG-TERM CURRENT USE OF INSULIN (HCC): Primary | ICD-10-CM

## 2019-02-19 LAB — HBA1C MFR BLD: 8.4 %

## 2019-02-19 PROCEDURE — G8420 CALC BMI NORM PARAMETERS: HCPCS | Performed by: FAMILY MEDICINE

## 2019-02-19 PROCEDURE — 1090F PRES/ABSN URINE INCON ASSESS: CPT | Performed by: FAMILY MEDICINE

## 2019-02-19 PROCEDURE — 4040F PNEUMOC VAC/ADMIN/RCVD: CPT | Performed by: FAMILY MEDICINE

## 2019-02-19 PROCEDURE — G8482 FLU IMMUNIZE ORDER/ADMIN: HCPCS | Performed by: FAMILY MEDICINE

## 2019-02-19 PROCEDURE — 83036 HEMOGLOBIN GLYCOSYLATED A1C: CPT | Performed by: FAMILY MEDICINE

## 2019-02-19 PROCEDURE — G8400 PT W/DXA NO RESULTS DOC: HCPCS | Performed by: FAMILY MEDICINE

## 2019-02-19 PROCEDURE — 1101F PT FALLS ASSESS-DOCD LE1/YR: CPT | Performed by: FAMILY MEDICINE

## 2019-02-19 PROCEDURE — G8427 DOCREV CUR MEDS BY ELIG CLIN: HCPCS | Performed by: FAMILY MEDICINE

## 2019-02-19 PROCEDURE — 1036F TOBACCO NON-USER: CPT | Performed by: FAMILY MEDICINE

## 2019-02-19 PROCEDURE — 99214 OFFICE O/P EST MOD 30 MIN: CPT | Performed by: FAMILY MEDICINE

## 2019-02-19 PROCEDURE — 1123F ACP DISCUSS/DSCN MKR DOCD: CPT | Performed by: FAMILY MEDICINE

## 2019-02-19 ASSESSMENT — ENCOUNTER SYMPTOMS
SORE THROAT: 0
SHORTNESS OF BREATH: 0
DIARRHEA: 0
COUGH: 0
BACK PAIN: 1
RHINORRHEA: 0
TROUBLE SWALLOWING: 0
CHOKING: 0
NAUSEA: 0
VOMITING: 0
ABDOMINAL PAIN: 0
WHEEZING: 0
CONSTIPATION: 0

## 2019-02-19 ASSESSMENT — PATIENT HEALTH QUESTIONNAIRE - PHQ9
SUM OF ALL RESPONSES TO PHQ QUESTIONS 1-9: 0
SUM OF ALL RESPONSES TO PHQ QUESTIONS 1-9: 0
1. LITTLE INTEREST OR PLEASURE IN DOING THINGS: 0

## 2019-02-25 ENCOUNTER — APPOINTMENT (OUTPATIENT)
Dept: CT IMAGING | Age: 84
DRG: 558 | End: 2019-02-25
Payer: MEDICARE

## 2019-02-25 ENCOUNTER — APPOINTMENT (OUTPATIENT)
Dept: GENERAL RADIOLOGY | Age: 84
DRG: 558 | End: 2019-02-25
Payer: MEDICARE

## 2019-02-25 ENCOUNTER — HOSPITAL ENCOUNTER (INPATIENT)
Age: 84
LOS: 3 days | Discharge: NURSING, REHAB OR LONG-TERM CARE | DRG: 558 | End: 2019-02-28
Attending: EMERGENCY MEDICINE | Admitting: INTERNAL MEDICINE
Payer: MEDICARE

## 2019-02-25 DIAGNOSIS — R41.0 DELIRIUM: ICD-10-CM

## 2019-02-25 DIAGNOSIS — M62.82 NON-TRAUMATIC RHABDOMYOLYSIS: ICD-10-CM

## 2019-02-25 DIAGNOSIS — J18.9 PNEUMONIA DUE TO ORGANISM: Primary | ICD-10-CM

## 2019-02-25 LAB
A/G RATIO: 1 (ref 1.1–2.2)
ALBUMIN SERPL-MCNC: 4.3 G/DL (ref 3.4–5)
ALP BLD-CCNC: 156 U/L (ref 40–129)
ALT SERPL-CCNC: 29 U/L (ref 10–40)
ANION GAP SERPL CALCULATED.3IONS-SCNC: 16 MMOL/L (ref 3–16)
AST SERPL-CCNC: 36 U/L (ref 15–37)
BASOPHILS ABSOLUTE: 0.1 K/UL (ref 0–0.2)
BASOPHILS RELATIVE PERCENT: 0.6 %
BILIRUB SERPL-MCNC: 1.3 MG/DL (ref 0–1)
BILIRUBIN URINE: NEGATIVE
BLOOD, URINE: ABNORMAL
BUN BLDV-MCNC: 38 MG/DL (ref 7–20)
CALCIUM SERPL-MCNC: 9.4 MG/DL (ref 8.3–10.6)
CHLORIDE BLD-SCNC: 100 MMOL/L (ref 99–110)
CLARITY: CLEAR
CO2: 25 MMOL/L (ref 21–32)
COLOR: YELLOW
COMMENT UA: ABNORMAL
CREAT SERPL-MCNC: 1.2 MG/DL (ref 0.6–1.2)
EOSINOPHILS ABSOLUTE: 0.2 K/UL (ref 0–0.6)
EOSINOPHILS RELATIVE PERCENT: 1.6 %
GFR AFRICAN AMERICAN: 52
GFR NON-AFRICAN AMERICAN: 43
GLOBULIN: 4.4 G/DL
GLUCOSE BLD-MCNC: 378 MG/DL (ref 70–99)
GLUCOSE URINE: 500 MG/DL
HCT VFR BLD CALC: 42.6 % (ref 36–48)
HEMOGLOBIN: 13.7 G/DL (ref 12–16)
INR BLD: 1.07 (ref 0.86–1.14)
KETONES, URINE: ABNORMAL MG/DL
LACTIC ACID: 2.2 MMOL/L (ref 0.4–2)
LEUKOCYTE ESTERASE, URINE: ABNORMAL
LIPASE: 79 U/L (ref 13–60)
LYMPHOCYTES ABSOLUTE: 1.3 K/UL (ref 1–5.1)
LYMPHOCYTES RELATIVE PERCENT: 9.4 %
MCH RBC QN AUTO: 27.1 PG (ref 26–34)
MCHC RBC AUTO-ENTMCNC: 32.1 G/DL (ref 31–36)
MCV RBC AUTO: 84.6 FL (ref 80–100)
MICROSCOPIC EXAMINATION: YES
MONOCYTES ABSOLUTE: 1 K/UL (ref 0–1.3)
MONOCYTES RELATIVE PERCENT: 7.4 %
NEUTROPHILS ABSOLUTE: 11.2 K/UL (ref 1.7–7.7)
NEUTROPHILS RELATIVE PERCENT: 81 %
NITRITE, URINE: NEGATIVE
PDW BLD-RTO: 14.3 % (ref 12.4–15.4)
PH UA: 5
PLATELET # BLD: 291 K/UL (ref 135–450)
PMV BLD AUTO: 10.3 FL (ref 5–10.5)
POTASSIUM SERPL-SCNC: 4.8 MMOL/L (ref 3.5–5.1)
PRO-BNP: 763 PG/ML (ref 0–449)
PROCALCITONIN: 0.17 NG/ML (ref 0–0.15)
PROTEIN UA: 100 MG/DL
PROTHROMBIN TIME: 12.2 SEC (ref 9.8–13)
RBC # BLD: 5.04 M/UL (ref 4–5.2)
RBC UA: ABNORMAL /HPF (ref 0–2)
SODIUM BLD-SCNC: 141 MMOL/L (ref 136–145)
SPECIFIC GRAVITY UA: >=1.03
SPECIMEN STATUS: NORMAL
TOTAL CK: 548 U/L (ref 26–192)
TOTAL PROTEIN: 8.7 G/DL (ref 6.4–8.2)
TROPONIN: <0.01 NG/ML
URINE REFLEX TO CULTURE: YES
URINE TYPE: ABNORMAL
UROBILINOGEN, URINE: 0.2 E.U./DL
WBC # BLD: 13.8 K/UL (ref 4–11)
WBC UA: >100 /HPF (ref 0–5)

## 2019-02-25 PROCEDURE — 70450 CT HEAD/BRAIN W/O DYE: CPT

## 2019-02-25 PROCEDURE — 85025 COMPLETE CBC W/AUTO DIFF WBC: CPT

## 2019-02-25 PROCEDURE — 2580000003 HC RX 258: Performed by: EMERGENCY MEDICINE

## 2019-02-25 PROCEDURE — 36415 COLL VENOUS BLD VENIPUNCTURE: CPT

## 2019-02-25 PROCEDURE — 71045 X-RAY EXAM CHEST 1 VIEW: CPT

## 2019-02-25 PROCEDURE — 87086 URINE CULTURE/COLONY COUNT: CPT

## 2019-02-25 PROCEDURE — 85610 PROTHROMBIN TIME: CPT

## 2019-02-25 PROCEDURE — 83605 ASSAY OF LACTIC ACID: CPT

## 2019-02-25 PROCEDURE — 6370000000 HC RX 637 (ALT 250 FOR IP): Performed by: INTERNAL MEDICINE

## 2019-02-25 PROCEDURE — 96361 HYDRATE IV INFUSION ADD-ON: CPT

## 2019-02-25 PROCEDURE — 6360000002 HC RX W HCPCS: Performed by: EMERGENCY MEDICINE

## 2019-02-25 PROCEDURE — 96365 THER/PROPH/DIAG IV INF INIT: CPT

## 2019-02-25 PROCEDURE — 93005 ELECTROCARDIOGRAM TRACING: CPT | Performed by: EMERGENCY MEDICINE

## 2019-02-25 PROCEDURE — 99285 EMERGENCY DEPT VISIT HI MDM: CPT

## 2019-02-25 PROCEDURE — 1200000000 HC SEMI PRIVATE

## 2019-02-25 PROCEDURE — 83880 ASSAY OF NATRIURETIC PEPTIDE: CPT

## 2019-02-25 PROCEDURE — 83690 ASSAY OF LIPASE: CPT

## 2019-02-25 PROCEDURE — 82550 ASSAY OF CK (CPK): CPT

## 2019-02-25 PROCEDURE — 87077 CULTURE AEROBIC IDENTIFY: CPT

## 2019-02-25 PROCEDURE — 84145 PROCALCITONIN (PCT): CPT

## 2019-02-25 PROCEDURE — 80053 COMPREHEN METABOLIC PANEL: CPT

## 2019-02-25 PROCEDURE — 84484 ASSAY OF TROPONIN QUANT: CPT

## 2019-02-25 PROCEDURE — 87186 SC STD MICRODIL/AGAR DIL: CPT

## 2019-02-25 PROCEDURE — 81001 URINALYSIS AUTO W/SCOPE: CPT

## 2019-02-25 RX ORDER — ATORVASTATIN CALCIUM 40 MG/1
40 TABLET, FILM COATED ORAL DAILY
Status: DISCONTINUED | OUTPATIENT
Start: 2019-02-26 | End: 2019-02-26

## 2019-02-25 RX ORDER — 0.9 % SODIUM CHLORIDE 0.9 %
1000 INTRAVENOUS SOLUTION INTRAVENOUS ONCE
Status: COMPLETED | OUTPATIENT
Start: 2019-02-25 | End: 2019-02-26

## 2019-02-25 RX ORDER — OXYBUTYNIN CHLORIDE 5 MG/1
5 TABLET ORAL 3 TIMES DAILY
Status: DISCONTINUED | OUTPATIENT
Start: 2019-02-25 | End: 2019-02-28 | Stop reason: HOSPADM

## 2019-02-25 RX ORDER — SODIUM CHLORIDE 9 MG/ML
INJECTION, SOLUTION INTRAVENOUS
Status: COMPLETED
Start: 2019-02-25 | End: 2019-02-26

## 2019-02-25 RX ORDER — GLIMEPIRIDE 4 MG/1
4 TABLET ORAL
Status: ON HOLD | COMMUNITY
End: 2019-03-07 | Stop reason: HOSPADM

## 2019-02-25 RX ORDER — AZITHROMYCIN 500 MG/1
500 INJECTION, POWDER, LYOPHILIZED, FOR SOLUTION INTRAVENOUS ONCE
Status: DISCONTINUED | OUTPATIENT
Start: 2019-02-25 | End: 2019-02-25 | Stop reason: SDUPTHER

## 2019-02-25 RX ORDER — ONDANSETRON 2 MG/ML
4 INJECTION INTRAMUSCULAR; INTRAVENOUS EVERY 6 HOURS PRN
Status: DISCONTINUED | OUTPATIENT
Start: 2019-02-25 | End: 2019-02-28 | Stop reason: HOSPADM

## 2019-02-25 RX ORDER — ACETAMINOPHEN 325 MG/1
650 TABLET ORAL EVERY 4 HOURS PRN
Status: DISCONTINUED | OUTPATIENT
Start: 2019-02-25 | End: 2019-02-28 | Stop reason: HOSPADM

## 2019-02-25 RX ORDER — SODIUM CHLORIDE 0.9 % (FLUSH) 0.9 %
10 SYRINGE (ML) INJECTION EVERY 12 HOURS SCHEDULED
Status: DISCONTINUED | OUTPATIENT
Start: 2019-02-25 | End: 2019-02-28 | Stop reason: HOSPADM

## 2019-02-25 RX ORDER — AMLODIPINE BESYLATE 5 MG/1
5 TABLET ORAL DAILY
Status: DISCONTINUED | OUTPATIENT
Start: 2019-02-26 | End: 2019-02-27

## 2019-02-25 RX ORDER — LISINOPRIL 20 MG/1
20 TABLET ORAL 2 TIMES DAILY
Status: DISCONTINUED | OUTPATIENT
Start: 2019-02-25 | End: 2019-02-26

## 2019-02-25 RX ORDER — SODIUM CHLORIDE 0.9 % (FLUSH) 0.9 %
10 SYRINGE (ML) INJECTION PRN
Status: DISCONTINUED | OUTPATIENT
Start: 2019-02-25 | End: 2019-02-28 | Stop reason: HOSPADM

## 2019-02-25 RX ADMIN — SODIUM CHLORIDE 1000 ML: 9 INJECTION, SOLUTION INTRAVENOUS at 17:59

## 2019-02-25 RX ADMIN — MAGNESIUM HYDROXIDE 30 ML: 400 SUSPENSION ORAL at 23:51

## 2019-02-25 RX ADMIN — LISINOPRIL 20 MG: 20 TABLET ORAL at 23:55

## 2019-02-25 RX ADMIN — CEFTRIAXONE SODIUM 1 G: 1 INJECTION, POWDER, FOR SOLUTION INTRAMUSCULAR; INTRAVENOUS at 20:35

## 2019-02-25 RX ADMIN — AZITHROMYCIN MONOHYDRATE 500 MG: 500 INJECTION, POWDER, LYOPHILIZED, FOR SOLUTION INTRAVENOUS at 23:57

## 2019-02-25 ASSESSMENT — PAIN SCALES - GENERAL: PAINLEVEL_OUTOF10: 0

## 2019-02-26 PROBLEM — E44.0 MODERATE MALNUTRITION (HCC): Status: ACTIVE | Noted: 2019-02-26

## 2019-02-26 LAB
ANION GAP SERPL CALCULATED.3IONS-SCNC: 11 MMOL/L (ref 3–16)
BASE EXCESS VENOUS: 4.7 MMOL/L (ref -3–3)
BASOPHILS ABSOLUTE: 0.1 K/UL (ref 0–0.2)
BASOPHILS RELATIVE PERCENT: 0.6 %
BUN BLDV-MCNC: 37 MG/DL (ref 7–20)
CALCIUM SERPL-MCNC: 8.6 MG/DL (ref 8.3–10.6)
CARBOXYHEMOGLOBIN: 1.6 % (ref 0–1.5)
CHLORIDE BLD-SCNC: 101 MMOL/L (ref 99–110)
CO2: 27 MMOL/L (ref 21–32)
CREAT SERPL-MCNC: 1.1 MG/DL (ref 0.6–1.2)
EKG ATRIAL RATE: 120 BPM
EKG DIAGNOSIS: NORMAL
EKG P AXIS: 51 DEGREES
EKG P-R INTERVAL: 122 MS
EKG Q-T INTERVAL: 326 MS
EKG QRS DURATION: 68 MS
EKG QTC CALCULATION (BAZETT): 460 MS
EKG R AXIS: 0 DEGREES
EKG T AXIS: 32 DEGREES
EKG VENTRICULAR RATE: 120 BPM
EOSINOPHILS ABSOLUTE: 0.4 K/UL (ref 0–0.6)
EOSINOPHILS RELATIVE PERCENT: 3.8 %
GFR AFRICAN AMERICAN: 58
GFR NON-AFRICAN AMERICAN: 48
GLUCOSE BLD-MCNC: 118 MG/DL (ref 70–99)
GLUCOSE BLD-MCNC: 285 MG/DL (ref 70–99)
GLUCOSE BLD-MCNC: 325 MG/DL (ref 70–99)
GLUCOSE BLD-MCNC: 344 MG/DL (ref 70–99)
GLUCOSE BLD-MCNC: 475 MG/DL (ref 70–99)
GLUCOSE BLD-MCNC: 484 MG/DL (ref 70–99)
HCO3 VENOUS: 29.1 MMOL/L (ref 23–29)
HCT VFR BLD CALC: 35.3 % (ref 36–48)
HEMOGLOBIN: 11.5 G/DL (ref 12–16)
LYMPHOCYTES ABSOLUTE: 1.3 K/UL (ref 1–5.1)
LYMPHOCYTES RELATIVE PERCENT: 11.6 %
MCH RBC QN AUTO: 27.3 PG (ref 26–34)
MCHC RBC AUTO-ENTMCNC: 32.7 G/DL (ref 31–36)
MCV RBC AUTO: 83.5 FL (ref 80–100)
METHEMOGLOBIN VENOUS: 0.4 %
MONOCYTES ABSOLUTE: 1.2 K/UL (ref 0–1.3)
MONOCYTES RELATIVE PERCENT: 10.8 %
NEUTROPHILS ABSOLUTE: 8 K/UL (ref 1.7–7.7)
NEUTROPHILS RELATIVE PERCENT: 73.2 %
O2 CONTENT, VEN: 15 VOL %
O2 SAT, VEN: 84 %
O2 THERAPY: ABNORMAL
PCO2, VEN: 42 MMHG (ref 40–50)
PDW BLD-RTO: 14.6 % (ref 12.4–15.4)
PERFORMED ON: ABNORMAL
PH VENOUS: 7.46 (ref 7.35–7.45)
PLATELET # BLD: 226 K/UL (ref 135–450)
PMV BLD AUTO: 10 FL (ref 5–10.5)
PO2, VEN: 47.2 MMHG (ref 25–40)
POTASSIUM REFLEX MAGNESIUM: 4.3 MMOL/L (ref 3.5–5.1)
RBC # BLD: 4.22 M/UL (ref 4–5.2)
SODIUM BLD-SCNC: 139 MMOL/L (ref 136–145)
TCO2 CALC VENOUS: 30 MMOL/L
WBC # BLD: 10.9 K/UL (ref 4–11)

## 2019-02-26 PROCEDURE — G8988 SELF CARE GOAL STATUS: HCPCS

## 2019-02-26 PROCEDURE — 2580000003 HC RX 258: Performed by: INTERNAL MEDICINE

## 2019-02-26 PROCEDURE — 97110 THERAPEUTIC EXERCISES: CPT

## 2019-02-26 PROCEDURE — 36415 COLL VENOUS BLD VENIPUNCTURE: CPT

## 2019-02-26 PROCEDURE — 2580000003 HC RX 258

## 2019-02-26 PROCEDURE — 97166 OT EVAL MOD COMPLEX 45 MIN: CPT

## 2019-02-26 PROCEDURE — 6360000002 HC RX W HCPCS: Performed by: INTERNAL MEDICINE

## 2019-02-26 PROCEDURE — 6370000000 HC RX 637 (ALT 250 FOR IP)

## 2019-02-26 PROCEDURE — 97162 PT EVAL MOD COMPLEX 30 MIN: CPT

## 2019-02-26 PROCEDURE — 2580000003 HC RX 258: Performed by: NURSE PRACTITIONER

## 2019-02-26 PROCEDURE — 6370000000 HC RX 637 (ALT 250 FOR IP): Performed by: INTERNAL MEDICINE

## 2019-02-26 PROCEDURE — 6370000000 HC RX 637 (ALT 250 FOR IP): Performed by: NURSE PRACTITIONER

## 2019-02-26 PROCEDURE — 85025 COMPLETE CBC W/AUTO DIFF WBC: CPT

## 2019-02-26 PROCEDURE — 1200000000 HC SEMI PRIVATE

## 2019-02-26 PROCEDURE — 2500000003 HC RX 250 WO HCPCS: Performed by: INTERNAL MEDICINE

## 2019-02-26 PROCEDURE — 93010 ELECTROCARDIOGRAM REPORT: CPT | Performed by: INTERNAL MEDICINE

## 2019-02-26 PROCEDURE — 82803 BLOOD GASES ANY COMBINATION: CPT

## 2019-02-26 PROCEDURE — 97530 THERAPEUTIC ACTIVITIES: CPT

## 2019-02-26 PROCEDURE — 80048 BASIC METABOLIC PNL TOTAL CA: CPT

## 2019-02-26 PROCEDURE — G8987 SELF CARE CURRENT STATUS: HCPCS

## 2019-02-26 PROCEDURE — 97535 SELF CARE MNGMENT TRAINING: CPT

## 2019-02-26 PROCEDURE — G8989 SELF CARE D/C STATUS: HCPCS

## 2019-02-26 RX ORDER — DEXTROSE MONOHYDRATE 50 MG/ML
100 INJECTION, SOLUTION INTRAVENOUS PRN
Status: DISCONTINUED | OUTPATIENT
Start: 2019-02-26 | End: 2019-02-28 | Stop reason: HOSPADM

## 2019-02-26 RX ORDER — 0.9 % SODIUM CHLORIDE 0.9 %
1000 INTRAVENOUS SOLUTION INTRAVENOUS ONCE
Status: COMPLETED | OUTPATIENT
Start: 2019-02-26 | End: 2019-02-27

## 2019-02-26 RX ORDER — NICOTINE POLACRILEX 4 MG
15 LOZENGE BUCCAL PRN
Status: DISCONTINUED | OUTPATIENT
Start: 2019-02-26 | End: 2019-02-28 | Stop reason: HOSPADM

## 2019-02-26 RX ORDER — DEXTROSE MONOHYDRATE 25 G/50ML
12.5 INJECTION, SOLUTION INTRAVENOUS PRN
Status: DISCONTINUED | OUTPATIENT
Start: 2019-02-26 | End: 2019-02-28 | Stop reason: HOSPADM

## 2019-02-26 RX ADMIN — OXYBUTYNIN CHLORIDE 5 MG: 5 TABLET ORAL at 15:22

## 2019-02-26 RX ADMIN — ACETAMINOPHEN 650 MG: 325 TABLET ORAL at 18:18

## 2019-02-26 RX ADMIN — ATORVASTATIN CALCIUM 40 MG: 40 TABLET, FILM COATED ORAL at 09:14

## 2019-02-26 RX ADMIN — OXYBUTYNIN CHLORIDE 5 MG: 5 TABLET ORAL at 21:53

## 2019-02-26 RX ADMIN — Medication 10 ML: at 21:53

## 2019-02-26 RX ADMIN — INSULIN LISPRO 18 UNITS: 100 INJECTION, SOLUTION INTRAVENOUS; SUBCUTANEOUS at 11:45

## 2019-02-26 RX ADMIN — CEFTRIAXONE SODIUM 1 G: 1 INJECTION, POWDER, FOR SOLUTION INTRAMUSCULAR; INTRAVENOUS at 18:14

## 2019-02-26 RX ADMIN — LISINOPRIL 20 MG: 20 TABLET ORAL at 09:14

## 2019-02-26 RX ADMIN — Medication 10 ML: at 00:08

## 2019-02-26 RX ADMIN — OXYBUTYNIN CHLORIDE 5 MG: 5 TABLET ORAL at 09:14

## 2019-02-26 RX ADMIN — AMLODIPINE BESYLATE 5 MG: 5 TABLET ORAL at 09:14

## 2019-02-26 RX ADMIN — Medication: at 00:00

## 2019-02-26 RX ADMIN — INSULIN LISPRO 9 UNITS: 100 INJECTION, SOLUTION INTRAVENOUS; SUBCUTANEOUS at 01:42

## 2019-02-26 RX ADMIN — SODIUM CHLORIDE 1000 ML: 9 INJECTION, SOLUTION INTRAVENOUS at 14:35

## 2019-02-26 RX ADMIN — INSULIN LISPRO 5 UNITS: 100 INJECTION, SOLUTION INTRAVENOUS; SUBCUTANEOUS at 21:53

## 2019-02-26 RX ADMIN — SODIUM CHLORIDE 500 ML: 9 INJECTION, SOLUTION INTRAVENOUS at 00:02

## 2019-02-26 RX ADMIN — INSULIN HUMAN 10 UNITS: 100 INJECTION, SOLUTION PARENTERAL at 01:41

## 2019-02-26 RX ADMIN — SODIUM BICARBONATE: 84 INJECTION, SOLUTION INTRAVENOUS at 02:14

## 2019-02-26 RX ADMIN — ENOXAPARIN SODIUM 40 MG: 40 INJECTION SUBCUTANEOUS at 09:14

## 2019-02-26 RX ADMIN — INSULIN LISPRO 12 UNITS: 100 INJECTION, SOLUTION INTRAVENOUS; SUBCUTANEOUS at 09:14

## 2019-02-26 RX ADMIN — OXYBUTYNIN CHLORIDE 5 MG: 5 TABLET ORAL at 00:01

## 2019-02-26 ASSESSMENT — PAIN SCALES - GENERAL
PAINLEVEL_OUTOF10: 3
PAINLEVEL_OUTOF10: 3
PAINLEVEL_OUTOF10: 7

## 2019-02-27 LAB
ANION GAP SERPL CALCULATED.3IONS-SCNC: 12 MMOL/L (ref 3–16)
BUN BLDV-MCNC: 37 MG/DL (ref 7–20)
CALCIUM SERPL-MCNC: 8.1 MG/DL (ref 8.3–10.6)
CHLORIDE BLD-SCNC: 103 MMOL/L (ref 99–110)
CO2: 23 MMOL/L (ref 21–32)
CREAT SERPL-MCNC: 1.5 MG/DL (ref 0.6–1.2)
GFR AFRICAN AMERICAN: 40
GFR NON-AFRICAN AMERICAN: 33
GLUCOSE BLD-MCNC: 156 MG/DL (ref 70–99)
GLUCOSE BLD-MCNC: 165 MG/DL (ref 70–99)
GLUCOSE BLD-MCNC: 202 MG/DL (ref 70–99)
GLUCOSE BLD-MCNC: 224 MG/DL (ref 70–99)
GLUCOSE BLD-MCNC: 262 MG/DL (ref 70–99)
HCT VFR BLD CALC: 34.5 % (ref 36–48)
HEMOGLOBIN: 11.1 G/DL (ref 12–16)
MAGNESIUM: 2 MG/DL (ref 1.8–2.4)
MCH RBC QN AUTO: 27.3 PG (ref 26–34)
MCHC RBC AUTO-ENTMCNC: 32.3 G/DL (ref 31–36)
MCV RBC AUTO: 84.6 FL (ref 80–100)
PDW BLD-RTO: 14.4 % (ref 12.4–15.4)
PERFORMED ON: ABNORMAL
PLATELET # BLD: 177 K/UL (ref 135–450)
PMV BLD AUTO: 10.2 FL (ref 5–10.5)
POTASSIUM REFLEX MAGNESIUM: 4.3 MMOL/L (ref 3.5–5.1)
RBC # BLD: 4.08 M/UL (ref 4–5.2)
SODIUM BLD-SCNC: 138 MMOL/L (ref 136–145)
TOTAL CK: 215 U/L (ref 26–192)
WBC # BLD: 7.4 K/UL (ref 4–11)

## 2019-02-27 PROCEDURE — 6360000002 HC RX W HCPCS: Performed by: INTERNAL MEDICINE

## 2019-02-27 PROCEDURE — 82550 ASSAY OF CK (CPK): CPT

## 2019-02-27 PROCEDURE — 80048 BASIC METABOLIC PNL TOTAL CA: CPT

## 2019-02-27 PROCEDURE — 6370000000 HC RX 637 (ALT 250 FOR IP): Performed by: INTERNAL MEDICINE

## 2019-02-27 PROCEDURE — 85027 COMPLETE CBC AUTOMATED: CPT

## 2019-02-27 PROCEDURE — 83735 ASSAY OF MAGNESIUM: CPT

## 2019-02-27 PROCEDURE — 2580000003 HC RX 258: Performed by: INTERNAL MEDICINE

## 2019-02-27 PROCEDURE — 6370000000 HC RX 637 (ALT 250 FOR IP)

## 2019-02-27 PROCEDURE — 97110 THERAPEUTIC EXERCISES: CPT

## 2019-02-27 PROCEDURE — 6370000000 HC RX 637 (ALT 250 FOR IP): Performed by: NURSE PRACTITIONER

## 2019-02-27 PROCEDURE — 1200000000 HC SEMI PRIVATE

## 2019-02-27 PROCEDURE — 36415 COLL VENOUS BLD VENIPUNCTURE: CPT

## 2019-02-27 PROCEDURE — 97116 GAIT TRAINING THERAPY: CPT

## 2019-02-27 RX ORDER — SODIUM CHLORIDE, SODIUM LACTATE, POTASSIUM CHLORIDE, CALCIUM CHLORIDE 600; 310; 30; 20 MG/100ML; MG/100ML; MG/100ML; MG/100ML
INJECTION, SOLUTION INTRAVENOUS
Status: DISPENSED
Start: 2019-02-27 | End: 2019-02-27

## 2019-02-27 RX ORDER — 0.9 % SODIUM CHLORIDE 0.9 %
500 INTRAVENOUS SOLUTION INTRAVENOUS ONCE
Status: COMPLETED | OUTPATIENT
Start: 2019-02-27 | End: 2019-02-27

## 2019-02-27 RX ORDER — METOPROLOL SUCCINATE 25 MG/1
TABLET, EXTENDED RELEASE ORAL
Status: COMPLETED
Start: 2019-02-27 | End: 2019-02-27

## 2019-02-27 RX ORDER — MAGNESIUM SULFATE 1 G/100ML
1 INJECTION INTRAVENOUS PRN
Status: DISCONTINUED | OUTPATIENT
Start: 2019-02-27 | End: 2019-02-28 | Stop reason: HOSPADM

## 2019-02-27 RX ORDER — INSULIN GLARGINE 100 [IU]/ML
8 INJECTION, SOLUTION SUBCUTANEOUS ONCE
Status: COMPLETED | OUTPATIENT
Start: 2019-02-27 | End: 2019-02-27

## 2019-02-27 RX ORDER — SODIUM CHLORIDE 9 MG/ML
INJECTION, SOLUTION INTRAVENOUS
Status: DISPENSED
Start: 2019-02-27 | End: 2019-02-27

## 2019-02-27 RX ORDER — SODIUM CHLORIDE, SODIUM LACTATE, POTASSIUM CHLORIDE, CALCIUM CHLORIDE 600; 310; 30; 20 MG/100ML; MG/100ML; MG/100ML; MG/100ML
INJECTION, SOLUTION INTRAVENOUS CONTINUOUS
Status: DISCONTINUED | OUTPATIENT
Start: 2019-02-27 | End: 2019-02-28

## 2019-02-27 RX ADMIN — METOPROLOL TARTRATE 12.5 MG: 25 TABLET ORAL at 21:10

## 2019-02-27 RX ADMIN — INSULIN GLARGINE 8 UNITS: 100 INJECTION, SOLUTION SUBCUTANEOUS at 14:57

## 2019-02-27 RX ADMIN — ENOXAPARIN SODIUM 40 MG: 40 INJECTION SUBCUTANEOUS at 09:44

## 2019-02-27 RX ADMIN — METOPROLOL TARTRATE 12.5 MG: 25 TABLET ORAL at 09:52

## 2019-02-27 RX ADMIN — CEFTRIAXONE SODIUM 1 G: 1 INJECTION, POWDER, FOR SOLUTION INTRAMUSCULAR; INTRAVENOUS at 17:05

## 2019-02-27 RX ADMIN — INSULIN LISPRO 6 UNITS: 100 INJECTION, SOLUTION INTRAVENOUS; SUBCUTANEOUS at 09:47

## 2019-02-27 RX ADMIN — SODIUM CHLORIDE 500 ML: 9 INJECTION, SOLUTION INTRAVENOUS at 09:37

## 2019-02-27 RX ADMIN — OXYBUTYNIN CHLORIDE 5 MG: 5 TABLET ORAL at 21:10

## 2019-02-27 RX ADMIN — Medication 10 ML: at 09:38

## 2019-02-27 RX ADMIN — INSULIN LISPRO 3 UNITS: 100 INJECTION, SOLUTION INTRAVENOUS; SUBCUTANEOUS at 17:10

## 2019-02-27 RX ADMIN — METOPROLOL TARTRATE 12.5 MG: 25 TABLET ORAL at 17:04

## 2019-02-27 RX ADMIN — OXYBUTYNIN CHLORIDE 5 MG: 5 TABLET ORAL at 09:44

## 2019-02-27 RX ADMIN — ACETAMINOPHEN 650 MG: 325 TABLET ORAL at 21:10

## 2019-02-27 RX ADMIN — SODIUM CHLORIDE, POTASSIUM CHLORIDE, SODIUM LACTATE AND CALCIUM CHLORIDE: 600; 310; 30; 20 INJECTION, SOLUTION INTRAVENOUS at 12:00

## 2019-02-27 RX ADMIN — Medication 10 ML: at 21:10

## 2019-02-27 RX ADMIN — INSULIN LISPRO 9 UNITS: 100 INJECTION, SOLUTION INTRAVENOUS; SUBCUTANEOUS at 12:10

## 2019-02-27 RX ADMIN — METOPROLOL SUCCINATE 25 MG: 25 TABLET, EXTENDED RELEASE ORAL at 09:44

## 2019-02-27 RX ADMIN — INSULIN LISPRO 2 UNITS: 100 INJECTION, SOLUTION INTRAVENOUS; SUBCUTANEOUS at 21:11

## 2019-02-27 RX ADMIN — OXYBUTYNIN CHLORIDE 5 MG: 5 TABLET ORAL at 17:04

## 2019-02-27 ASSESSMENT — PAIN SCALES - GENERAL
PAINLEVEL_OUTOF10: 0
PAINLEVEL_OUTOF10: 5
PAINLEVEL_OUTOF10: 0
PAINLEVEL_OUTOF10: 5
PAINLEVEL_OUTOF10: 0

## 2019-02-28 ENCOUNTER — HOSPITAL ENCOUNTER (INPATIENT)
Age: 84
LOS: 8 days | Discharge: HOME HEALTH | DRG: 948 | End: 2019-03-08
Attending: PHYSICAL MEDICINE & REHABILITATION | Admitting: PHYSICAL MEDICINE & REHABILITATION
Payer: MEDICARE

## 2019-02-28 VITALS
SYSTOLIC BLOOD PRESSURE: 139 MMHG | HEIGHT: 65 IN | RESPIRATION RATE: 16 BRPM | DIASTOLIC BLOOD PRESSURE: 64 MMHG | HEART RATE: 82 BPM | WEIGHT: 145.5 LBS | OXYGEN SATURATION: 94 % | BODY MASS INDEX: 24.24 KG/M2 | TEMPERATURE: 98.1 F

## 2019-02-28 LAB
ANION GAP SERPL CALCULATED.3IONS-SCNC: 11 MMOL/L (ref 3–16)
BUN BLDV-MCNC: 26 MG/DL (ref 7–20)
CALCIUM SERPL-MCNC: 8.9 MG/DL (ref 8.3–10.6)
CHLORIDE BLD-SCNC: 103 MMOL/L (ref 99–110)
CO2: 27 MMOL/L (ref 21–32)
CREAT SERPL-MCNC: 1.2 MG/DL (ref 0.6–1.2)
GFR AFRICAN AMERICAN: 52
GFR NON-AFRICAN AMERICAN: 43
GLUCOSE BLD-MCNC: 108 MG/DL (ref 70–99)
GLUCOSE BLD-MCNC: 157 MG/DL (ref 70–99)
GLUCOSE BLD-MCNC: 161 MG/DL (ref 70–99)
GLUCOSE BLD-MCNC: 225 MG/DL (ref 70–99)
GLUCOSE BLD-MCNC: 271 MG/DL (ref 70–99)
PERFORMED ON: ABNORMAL
POTASSIUM REFLEX MAGNESIUM: 4.1 MMOL/L (ref 3.5–5.1)
SODIUM BLD-SCNC: 141 MMOL/L (ref 136–145)

## 2019-02-28 PROCEDURE — 6370000000 HC RX 637 (ALT 250 FOR IP): Performed by: NURSE PRACTITIONER

## 2019-02-28 PROCEDURE — 1280000000 HC REHAB R&B

## 2019-02-28 PROCEDURE — 6370000000 HC RX 637 (ALT 250 FOR IP): Performed by: INTERNAL MEDICINE

## 2019-02-28 PROCEDURE — 36415 COLL VENOUS BLD VENIPUNCTURE: CPT

## 2019-02-28 PROCEDURE — 6370000000 HC RX 637 (ALT 250 FOR IP): Performed by: PHYSICAL MEDICINE & REHABILITATION

## 2019-02-28 PROCEDURE — 97535 SELF CARE MNGMENT TRAINING: CPT

## 2019-02-28 PROCEDURE — 97110 THERAPEUTIC EXERCISES: CPT

## 2019-02-28 PROCEDURE — 80048 BASIC METABOLIC PNL TOTAL CA: CPT

## 2019-02-28 PROCEDURE — 2580000003 HC RX 258: Performed by: INTERNAL MEDICINE

## 2019-02-28 PROCEDURE — 6360000002 HC RX W HCPCS: Performed by: INTERNAL MEDICINE

## 2019-02-28 RX ORDER — DEXTROSE MONOHYDRATE 50 MG/ML
100 INJECTION, SOLUTION INTRAVENOUS PRN
Status: CANCELLED | OUTPATIENT
Start: 2019-02-28

## 2019-02-28 RX ORDER — DEXTROSE MONOHYDRATE 25 G/50ML
12.5 INJECTION, SOLUTION INTRAVENOUS PRN
Status: CANCELLED | OUTPATIENT
Start: 2019-02-28

## 2019-02-28 RX ORDER — DEXTROSE MONOHYDRATE 25 G/50ML
12.5 INJECTION, SOLUTION INTRAVENOUS PRN
Status: DISCONTINUED | OUTPATIENT
Start: 2019-02-28 | End: 2019-03-08 | Stop reason: HOSPADM

## 2019-02-28 RX ORDER — ONDANSETRON 4 MG/1
4 TABLET, ORALLY DISINTEGRATING ORAL EVERY 8 HOURS PRN
Status: DISCONTINUED | OUTPATIENT
Start: 2019-02-28 | End: 2019-03-08 | Stop reason: HOSPADM

## 2019-02-28 RX ORDER — BISACODYL 10 MG
10 SUPPOSITORY, RECTAL RECTAL DAILY PRN
Status: CANCELLED | OUTPATIENT
Start: 2019-02-28

## 2019-02-28 RX ORDER — POLYETHYLENE GLYCOL 3350 17 G/17G
17 POWDER, FOR SOLUTION ORAL DAILY PRN
Status: DISCONTINUED | OUTPATIENT
Start: 2019-02-28 | End: 2019-03-08 | Stop reason: HOSPADM

## 2019-02-28 RX ORDER — OXYBUTYNIN CHLORIDE 5 MG/1
5 TABLET ORAL 3 TIMES DAILY
Status: DISCONTINUED | OUTPATIENT
Start: 2019-02-28 | End: 2019-03-08 | Stop reason: HOSPADM

## 2019-02-28 RX ORDER — CHOLECALCIFEROL (VITAMIN D3) 125 MCG
5 CAPSULE ORAL NIGHTLY PRN
Status: DISCONTINUED | OUTPATIENT
Start: 2019-02-28 | End: 2019-03-08 | Stop reason: HOSPADM

## 2019-02-28 RX ORDER — OXYBUTYNIN CHLORIDE 5 MG/1
5 TABLET ORAL 3 TIMES DAILY
Status: CANCELLED | OUTPATIENT
Start: 2019-02-28

## 2019-02-28 RX ORDER — NICOTINE POLACRILEX 4 MG
15 LOZENGE BUCCAL PRN
Status: CANCELLED | OUTPATIENT
Start: 2019-02-28

## 2019-02-28 RX ORDER — CEFUROXIME AXETIL 250 MG/1
250 TABLET ORAL EVERY 12 HOURS SCHEDULED
Status: DISCONTINUED | OUTPATIENT
Start: 2019-03-01 | End: 2019-03-01

## 2019-02-28 RX ORDER — ACETAMINOPHEN 325 MG/1
650 TABLET ORAL EVERY 6 HOURS PRN
Status: DISCONTINUED | OUTPATIENT
Start: 2019-02-28 | End: 2019-03-08 | Stop reason: HOSPADM

## 2019-02-28 RX ORDER — CEFUROXIME AXETIL 250 MG/1
250 TABLET ORAL EVERY 12 HOURS SCHEDULED
Status: CANCELLED | OUTPATIENT
Start: 2019-03-01 | End: 2019-03-03

## 2019-02-28 RX ORDER — NICOTINE POLACRILEX 4 MG
15 LOZENGE BUCCAL PRN
Status: DISCONTINUED | OUTPATIENT
Start: 2019-02-28 | End: 2019-03-08 | Stop reason: HOSPADM

## 2019-02-28 RX ORDER — DEXTROSE MONOHYDRATE 50 MG/ML
100 INJECTION, SOLUTION INTRAVENOUS PRN
Status: DISCONTINUED | OUTPATIENT
Start: 2019-02-28 | End: 2019-03-08 | Stop reason: HOSPADM

## 2019-02-28 RX ORDER — CEFUROXIME AXETIL 250 MG/1
250 TABLET ORAL 2 TIMES DAILY
Qty: 4 TABLET | Refills: 0 | Status: ON HOLD | OUTPATIENT
Start: 2019-02-28 | End: 2019-03-07 | Stop reason: HOSPADM

## 2019-02-28 RX ORDER — CHOLECALCIFEROL (VITAMIN D3) 125 MCG
5 CAPSULE ORAL NIGHTLY PRN
Status: CANCELLED | OUTPATIENT
Start: 2019-02-28

## 2019-02-28 RX ORDER — DOCUSATE SODIUM 100 MG/1
100 CAPSULE, LIQUID FILLED ORAL 2 TIMES DAILY
Status: CANCELLED | OUTPATIENT
Start: 2019-02-28

## 2019-02-28 RX ORDER — POLYETHYLENE GLYCOL 3350 17 G/17G
17 POWDER, FOR SOLUTION ORAL DAILY PRN
Status: CANCELLED | OUTPATIENT
Start: 2019-02-28

## 2019-02-28 RX ORDER — DOCUSATE SODIUM 100 MG/1
100 CAPSULE, LIQUID FILLED ORAL 2 TIMES DAILY
Status: DISCONTINUED | OUTPATIENT
Start: 2019-02-28 | End: 2019-03-08 | Stop reason: HOSPADM

## 2019-02-28 RX ORDER — BISACODYL 10 MG
10 SUPPOSITORY, RECTAL RECTAL DAILY PRN
Status: DISCONTINUED | OUTPATIENT
Start: 2019-02-28 | End: 2019-03-08 | Stop reason: HOSPADM

## 2019-02-28 RX ORDER — ONDANSETRON HYDROCHLORIDE 8 MG/1
4 TABLET, FILM COATED ORAL EVERY 8 HOURS PRN
Status: CANCELLED | OUTPATIENT
Start: 2019-02-28

## 2019-02-28 RX ORDER — ACETAMINOPHEN 325 MG/1
650 TABLET ORAL EVERY 6 HOURS PRN
Status: CANCELLED | OUTPATIENT
Start: 2019-02-28

## 2019-02-28 RX ADMIN — OXYBUTYNIN CHLORIDE 5 MG: 5 TABLET ORAL at 20:32

## 2019-02-28 RX ADMIN — INSULIN LISPRO 3 UNITS: 100 INJECTION, SOLUTION INTRAVENOUS; SUBCUTANEOUS at 10:42

## 2019-02-28 RX ADMIN — OXYBUTYNIN CHLORIDE 5 MG: 5 TABLET ORAL at 13:15

## 2019-02-28 RX ADMIN — INSULIN LISPRO 3 UNITS: 100 INJECTION, SOLUTION INTRAVENOUS; SUBCUTANEOUS at 20:36

## 2019-02-28 RX ADMIN — INSULIN LISPRO 9 UNITS: 100 INJECTION, SOLUTION INTRAVENOUS; SUBCUTANEOUS at 13:16

## 2019-02-28 RX ADMIN — OXYBUTYNIN CHLORIDE 5 MG: 5 TABLET ORAL at 10:40

## 2019-02-28 RX ADMIN — ENOXAPARIN SODIUM 30 MG: 30 INJECTION SUBCUTANEOUS at 10:40

## 2019-02-28 RX ADMIN — SODIUM CHLORIDE, POTASSIUM CHLORIDE, SODIUM LACTATE AND CALCIUM CHLORIDE: 600; 310; 30; 20 INJECTION, SOLUTION INTRAVENOUS at 06:06

## 2019-02-28 RX ADMIN — METOPROLOL TARTRATE 25 MG: 25 TABLET ORAL at 20:32

## 2019-02-28 RX ADMIN — DOCUSATE SODIUM 100 MG: 100 CAPSULE, LIQUID FILLED ORAL at 20:32

## 2019-02-28 RX ADMIN — Medication 5 MG: at 20:32

## 2019-02-28 RX ADMIN — METOPROLOL TARTRATE 25 MG: 25 TABLET ORAL at 10:40

## 2019-02-28 ASSESSMENT — PAIN SCALES - GENERAL
PAINLEVEL_OUTOF10: 0

## 2019-03-01 PROBLEM — E44.0 MODERATE MALNUTRITION (HCC): Status: ACTIVE | Noted: 2019-03-01

## 2019-03-01 LAB
ANION GAP SERPL CALCULATED.3IONS-SCNC: 11 MMOL/L (ref 3–16)
BASOPHILS ABSOLUTE: 0.1 K/UL (ref 0–0.2)
BASOPHILS RELATIVE PERCENT: 0.8 %
BUN BLDV-MCNC: 16 MG/DL (ref 7–20)
CALCIUM SERPL-MCNC: 8.9 MG/DL (ref 8.3–10.6)
CHLORIDE BLD-SCNC: 97 MMOL/L (ref 99–110)
CO2: 28 MMOL/L (ref 21–32)
CREAT SERPL-MCNC: 0.9 MG/DL (ref 0.6–1.2)
EOSINOPHILS ABSOLUTE: 0.6 K/UL (ref 0–0.6)
EOSINOPHILS RELATIVE PERCENT: 8.7 %
GFR AFRICAN AMERICAN: >60
GFR NON-AFRICAN AMERICAN: 60
GLUCOSE BLD-MCNC: 149 MG/DL (ref 70–99)
GLUCOSE BLD-MCNC: 173 MG/DL (ref 70–99)
GLUCOSE BLD-MCNC: 200 MG/DL (ref 70–99)
GLUCOSE BLD-MCNC: 203 MG/DL (ref 70–99)
GLUCOSE BLD-MCNC: 251 MG/DL (ref 70–99)
HCT VFR BLD CALC: 36.4 % (ref 36–48)
HEMOGLOBIN: 11.6 G/DL (ref 12–16)
LYMPHOCYTES ABSOLUTE: 1.2 K/UL (ref 1–5.1)
LYMPHOCYTES RELATIVE PERCENT: 17.4 %
MCH RBC QN AUTO: 27.3 PG (ref 26–34)
MCHC RBC AUTO-ENTMCNC: 31.9 G/DL (ref 31–36)
MCV RBC AUTO: 85.6 FL (ref 80–100)
MONOCYTES ABSOLUTE: 0.5 K/UL (ref 0–1.3)
MONOCYTES RELATIVE PERCENT: 8.1 %
NEUTROPHILS ABSOLUTE: 4.4 K/UL (ref 1.7–7.7)
NEUTROPHILS RELATIVE PERCENT: 65 %
ORGANISM: ABNORMAL
PDW BLD-RTO: 14.2 % (ref 12.4–15.4)
PERFORMED ON: ABNORMAL
PLATELET # BLD: 215 K/UL (ref 135–450)
PMV BLD AUTO: 9.9 FL (ref 5–10.5)
POTASSIUM REFLEX MAGNESIUM: 3.7 MMOL/L (ref 3.5–5.1)
PREALBUMIN: 17.6 MG/DL (ref 20–40)
RBC # BLD: 4.25 M/UL (ref 4–5.2)
SODIUM BLD-SCNC: 136 MMOL/L (ref 136–145)
TOTAL CK: 140 U/L (ref 26–192)
URINE CULTURE, ROUTINE: ABNORMAL
URINE CULTURE, ROUTINE: ABNORMAL
WBC # BLD: 6.7 K/UL (ref 4–11)

## 2019-03-01 PROCEDURE — 97116 GAIT TRAINING THERAPY: CPT

## 2019-03-01 PROCEDURE — 82550 ASSAY OF CK (CPK): CPT

## 2019-03-01 PROCEDURE — 36415 COLL VENOUS BLD VENIPUNCTURE: CPT

## 2019-03-01 PROCEDURE — 97110 THERAPEUTIC EXERCISES: CPT

## 2019-03-01 PROCEDURE — 6370000000 HC RX 637 (ALT 250 FOR IP): Performed by: PHYSICAL MEDICINE & REHABILITATION

## 2019-03-01 PROCEDURE — 80048 BASIC METABOLIC PNL TOTAL CA: CPT

## 2019-03-01 PROCEDURE — 85025 COMPLETE CBC W/AUTO DIFF WBC: CPT

## 2019-03-01 PROCEDURE — 84134 ASSAY OF PREALBUMIN: CPT

## 2019-03-01 PROCEDURE — 1280000000 HC REHAB R&B

## 2019-03-01 PROCEDURE — 6360000002 HC RX W HCPCS: Performed by: PHYSICAL MEDICINE & REHABILITATION

## 2019-03-01 PROCEDURE — 97530 THERAPEUTIC ACTIVITIES: CPT

## 2019-03-01 PROCEDURE — 97535 SELF CARE MNGMENT TRAINING: CPT

## 2019-03-01 PROCEDURE — 97166 OT EVAL MOD COMPLEX 45 MIN: CPT

## 2019-03-01 PROCEDURE — 97162 PT EVAL MOD COMPLEX 30 MIN: CPT

## 2019-03-01 RX ORDER — LISINOPRIL 10 MG/1
10 TABLET ORAL DAILY
Status: DISCONTINUED | OUTPATIENT
Start: 2019-03-01 | End: 2019-03-07

## 2019-03-01 RX ORDER — ATORVASTATIN CALCIUM 40 MG/1
40 TABLET, FILM COATED ORAL NIGHTLY
Status: DISCONTINUED | OUTPATIENT
Start: 2019-03-01 | End: 2019-03-08 | Stop reason: HOSPADM

## 2019-03-01 RX ORDER — CIPROFLOXACIN 500 MG/1
500 TABLET, FILM COATED ORAL EVERY 12 HOURS SCHEDULED
Status: DISCONTINUED | OUTPATIENT
Start: 2019-03-01 | End: 2019-03-06

## 2019-03-01 RX ORDER — METFORMIN HYDROCHLORIDE 500 MG/1
500 TABLET, EXTENDED RELEASE ORAL
Status: DISCONTINUED | OUTPATIENT
Start: 2019-03-02 | End: 2019-03-08 | Stop reason: HOSPADM

## 2019-03-01 RX ADMIN — OXYBUTYNIN CHLORIDE 5 MG: 5 TABLET ORAL at 14:22

## 2019-03-01 RX ADMIN — INSULIN LISPRO 6 UNITS: 100 INJECTION, SOLUTION INTRAVENOUS; SUBCUTANEOUS at 06:44

## 2019-03-01 RX ADMIN — METOPROLOL TARTRATE 25 MG: 25 TABLET ORAL at 09:58

## 2019-03-01 RX ADMIN — METOPROLOL TARTRATE 25 MG: 25 TABLET ORAL at 20:44

## 2019-03-01 RX ADMIN — INSULIN LISPRO 3 UNITS: 100 INJECTION, SOLUTION INTRAVENOUS; SUBCUTANEOUS at 11:10

## 2019-03-01 RX ADMIN — INSULIN LISPRO 6 UNITS: 100 INJECTION, SOLUTION INTRAVENOUS; SUBCUTANEOUS at 18:22

## 2019-03-01 RX ADMIN — CIPROFLOXACIN HYDROCHLORIDE 500 MG: 500 TABLET, FILM COATED ORAL at 20:43

## 2019-03-01 RX ADMIN — ENOXAPARIN SODIUM 40 MG: 40 INJECTION SUBCUTANEOUS at 09:59

## 2019-03-01 RX ADMIN — ATORVASTATIN CALCIUM 40 MG: 40 TABLET, FILM COATED ORAL at 20:44

## 2019-03-01 RX ADMIN — CEFUROXIME AXETIL 250 MG: 250 TABLET ORAL at 09:58

## 2019-03-01 RX ADMIN — INSULIN LISPRO 5 UNITS: 100 INJECTION, SOLUTION INTRAVENOUS; SUBCUTANEOUS at 20:44

## 2019-03-01 ASSESSMENT — PAIN SCALES - GENERAL: PAINLEVEL_OUTOF10: 0

## 2019-03-02 LAB
GLUCOSE BLD-MCNC: 139 MG/DL (ref 70–99)
GLUCOSE BLD-MCNC: 183 MG/DL (ref 70–99)
GLUCOSE BLD-MCNC: 218 MG/DL (ref 70–99)
GLUCOSE BLD-MCNC: 226 MG/DL (ref 70–99)
PERFORMED ON: ABNORMAL

## 2019-03-02 PROCEDURE — 97110 THERAPEUTIC EXERCISES: CPT

## 2019-03-02 PROCEDURE — 97535 SELF CARE MNGMENT TRAINING: CPT

## 2019-03-02 PROCEDURE — 6370000000 HC RX 637 (ALT 250 FOR IP): Performed by: PHYSICAL MEDICINE & REHABILITATION

## 2019-03-02 PROCEDURE — 97530 THERAPEUTIC ACTIVITIES: CPT

## 2019-03-02 PROCEDURE — 97116 GAIT TRAINING THERAPY: CPT

## 2019-03-02 PROCEDURE — 97112 NEUROMUSCULAR REEDUCATION: CPT

## 2019-03-02 PROCEDURE — 1280000000 HC REHAB R&B

## 2019-03-02 RX ADMIN — OXYBUTYNIN CHLORIDE 5 MG: 5 TABLET ORAL at 20:38

## 2019-03-02 RX ADMIN — INSULIN LISPRO 3 UNITS: 100 INJECTION, SOLUTION INTRAVENOUS; SUBCUTANEOUS at 21:13

## 2019-03-02 RX ADMIN — CIPROFLOXACIN HYDROCHLORIDE 500 MG: 500 TABLET, FILM COATED ORAL at 20:38

## 2019-03-02 RX ADMIN — INSULIN LISPRO 6 UNITS: 100 INJECTION, SOLUTION INTRAVENOUS; SUBCUTANEOUS at 12:19

## 2019-03-02 RX ADMIN — METOPROLOL TARTRATE 25 MG: 25 TABLET ORAL at 08:29

## 2019-03-02 RX ADMIN — LISINOPRIL 10 MG: 10 TABLET ORAL at 08:29

## 2019-03-02 RX ADMIN — CIPROFLOXACIN HYDROCHLORIDE 500 MG: 500 TABLET, FILM COATED ORAL at 08:29

## 2019-03-02 RX ADMIN — OXYBUTYNIN CHLORIDE 5 MG: 5 TABLET ORAL at 16:08

## 2019-03-02 RX ADMIN — OXYBUTYNIN CHLORIDE 5 MG: 5 TABLET ORAL at 08:29

## 2019-03-02 RX ADMIN — ATORVASTATIN CALCIUM 40 MG: 40 TABLET, FILM COATED ORAL at 20:38

## 2019-03-02 RX ADMIN — INSULIN LISPRO 3 UNITS: 100 INJECTION, SOLUTION INTRAVENOUS; SUBCUTANEOUS at 06:37

## 2019-03-02 RX ADMIN — METOPROLOL TARTRATE 25 MG: 25 TABLET ORAL at 20:38

## 2019-03-02 ASSESSMENT — PAIN SCALES - GENERAL
PAINLEVEL_OUTOF10: 0

## 2019-03-03 LAB
GLUCOSE BLD-MCNC: 107 MG/DL (ref 70–99)
GLUCOSE BLD-MCNC: 176 MG/DL (ref 70–99)
GLUCOSE BLD-MCNC: 188 MG/DL (ref 70–99)
GLUCOSE BLD-MCNC: 364 MG/DL (ref 70–99)
PERFORMED ON: ABNORMAL

## 2019-03-03 PROCEDURE — 6360000002 HC RX W HCPCS: Performed by: PHYSICAL MEDICINE & REHABILITATION

## 2019-03-03 PROCEDURE — 1280000000 HC REHAB R&B

## 2019-03-03 PROCEDURE — 6370000000 HC RX 637 (ALT 250 FOR IP): Performed by: PHYSICAL MEDICINE & REHABILITATION

## 2019-03-03 RX ADMIN — ATORVASTATIN CALCIUM 40 MG: 40 TABLET, FILM COATED ORAL at 21:23

## 2019-03-03 RX ADMIN — CIPROFLOXACIN HYDROCHLORIDE 500 MG: 500 TABLET, FILM COATED ORAL at 11:07

## 2019-03-03 RX ADMIN — METOPROLOL TARTRATE 25 MG: 25 TABLET ORAL at 21:23

## 2019-03-03 RX ADMIN — METFORMIN HYDROCHLORIDE 500 MG: 500 TABLET, EXTENDED RELEASE ORAL at 11:07

## 2019-03-03 RX ADMIN — ENOXAPARIN SODIUM 40 MG: 40 INJECTION SUBCUTANEOUS at 11:07

## 2019-03-03 RX ADMIN — LISINOPRIL 10 MG: 10 TABLET ORAL at 11:08

## 2019-03-03 RX ADMIN — OXYBUTYNIN CHLORIDE 5 MG: 5 TABLET ORAL at 11:07

## 2019-03-03 RX ADMIN — INSULIN LISPRO 2 UNITS: 100 INJECTION, SOLUTION INTRAVENOUS; SUBCUTANEOUS at 21:30

## 2019-03-03 RX ADMIN — CIPROFLOXACIN HYDROCHLORIDE 500 MG: 500 TABLET, FILM COATED ORAL at 21:23

## 2019-03-03 RX ADMIN — OXYBUTYNIN CHLORIDE 5 MG: 5 TABLET ORAL at 21:23

## 2019-03-03 RX ADMIN — DOCUSATE SODIUM 100 MG: 100 CAPSULE, LIQUID FILLED ORAL at 21:23

## 2019-03-03 RX ADMIN — METOPROLOL TARTRATE 25 MG: 25 TABLET ORAL at 11:07

## 2019-03-03 RX ADMIN — DOCUSATE SODIUM 100 MG: 100 CAPSULE, LIQUID FILLED ORAL at 11:08

## 2019-03-03 RX ADMIN — INSULIN LISPRO 15 UNITS: 100 INJECTION, SOLUTION INTRAVENOUS; SUBCUTANEOUS at 13:11

## 2019-03-03 ASSESSMENT — PAIN SCALES - GENERAL
PAINLEVEL_OUTOF10: 0
PAINLEVEL_OUTOF10: 0

## 2019-03-04 LAB
ANION GAP SERPL CALCULATED.3IONS-SCNC: 10 MMOL/L (ref 3–16)
BASOPHILS ABSOLUTE: 0.1 K/UL (ref 0–0.2)
BASOPHILS RELATIVE PERCENT: 0.8 %
BUN BLDV-MCNC: 18 MG/DL (ref 7–20)
CALCIUM SERPL-MCNC: 8.4 MG/DL (ref 8.3–10.6)
CHLORIDE BLD-SCNC: 101 MMOL/L (ref 99–110)
CO2: 25 MMOL/L (ref 21–32)
CREAT SERPL-MCNC: 1.1 MG/DL (ref 0.6–1.2)
EOSINOPHILS ABSOLUTE: 0.4 K/UL (ref 0–0.6)
EOSINOPHILS RELATIVE PERCENT: 5.5 %
GFR AFRICAN AMERICAN: 58
GFR NON-AFRICAN AMERICAN: 48
GLUCOSE BLD-MCNC: 162 MG/DL (ref 70–99)
GLUCOSE BLD-MCNC: 169 MG/DL (ref 70–99)
GLUCOSE BLD-MCNC: 173 MG/DL (ref 70–99)
GLUCOSE BLD-MCNC: 188 MG/DL (ref 70–99)
GLUCOSE BLD-MCNC: 199 MG/DL (ref 70–99)
HCT VFR BLD CALC: 33.2 % (ref 36–48)
HEMOGLOBIN: 10.7 G/DL (ref 12–16)
LYMPHOCYTES ABSOLUTE: 1.2 K/UL (ref 1–5.1)
LYMPHOCYTES RELATIVE PERCENT: 15.7 %
MCH RBC QN AUTO: 27.4 PG (ref 26–34)
MCHC RBC AUTO-ENTMCNC: 32.2 G/DL (ref 31–36)
MCV RBC AUTO: 85.1 FL (ref 80–100)
MONOCYTES ABSOLUTE: 0.7 K/UL (ref 0–1.3)
MONOCYTES RELATIVE PERCENT: 10 %
NEUTROPHILS ABSOLUTE: 5 K/UL (ref 1.7–7.7)
NEUTROPHILS RELATIVE PERCENT: 68 %
PDW BLD-RTO: 14.5 % (ref 12.4–15.4)
PERFORMED ON: ABNORMAL
PLATELET # BLD: 183 K/UL (ref 135–450)
PMV BLD AUTO: 9.8 FL (ref 5–10.5)
POTASSIUM REFLEX MAGNESIUM: 4.2 MMOL/L (ref 3.5–5.1)
RBC # BLD: 3.9 M/UL (ref 4–5.2)
SODIUM BLD-SCNC: 136 MMOL/L (ref 136–145)
TOTAL CK: 56 U/L (ref 26–192)
WBC # BLD: 7.4 K/UL (ref 4–11)

## 2019-03-04 PROCEDURE — 97116 GAIT TRAINING THERAPY: CPT

## 2019-03-04 PROCEDURE — 6360000002 HC RX W HCPCS: Performed by: PHYSICAL MEDICINE & REHABILITATION

## 2019-03-04 PROCEDURE — 80048 BASIC METABOLIC PNL TOTAL CA: CPT

## 2019-03-04 PROCEDURE — 97530 THERAPEUTIC ACTIVITIES: CPT

## 2019-03-04 PROCEDURE — 97110 THERAPEUTIC EXERCISES: CPT

## 2019-03-04 PROCEDURE — 6370000000 HC RX 637 (ALT 250 FOR IP): Performed by: PHYSICAL MEDICINE & REHABILITATION

## 2019-03-04 PROCEDURE — 1280000000 HC REHAB R&B

## 2019-03-04 PROCEDURE — 97535 SELF CARE MNGMENT TRAINING: CPT

## 2019-03-04 PROCEDURE — 36415 COLL VENOUS BLD VENIPUNCTURE: CPT

## 2019-03-04 PROCEDURE — 85025 COMPLETE CBC W/AUTO DIFF WBC: CPT

## 2019-03-04 PROCEDURE — 82550 ASSAY OF CK (CPK): CPT

## 2019-03-04 RX ORDER — TRAZODONE HYDROCHLORIDE 50 MG/1
50 TABLET ORAL NIGHTLY
Status: DISCONTINUED | OUTPATIENT
Start: 2019-03-04 | End: 2019-03-08 | Stop reason: HOSPADM

## 2019-03-04 RX ADMIN — CIPROFLOXACIN HYDROCHLORIDE 500 MG: 500 TABLET, FILM COATED ORAL at 08:09

## 2019-03-04 RX ADMIN — OXYBUTYNIN CHLORIDE 5 MG: 5 TABLET ORAL at 08:09

## 2019-03-04 RX ADMIN — METOPROLOL TARTRATE 25 MG: 25 TABLET ORAL at 08:09

## 2019-03-04 RX ADMIN — INSULIN LISPRO 2 UNITS: 100 INJECTION, SOLUTION INTRAVENOUS; SUBCUTANEOUS at 21:04

## 2019-03-04 RX ADMIN — METFORMIN HYDROCHLORIDE 500 MG: 500 TABLET, EXTENDED RELEASE ORAL at 08:09

## 2019-03-04 RX ADMIN — ATORVASTATIN CALCIUM 40 MG: 40 TABLET, FILM COATED ORAL at 21:00

## 2019-03-04 RX ADMIN — CIPROFLOXACIN HYDROCHLORIDE 500 MG: 500 TABLET, FILM COATED ORAL at 21:00

## 2019-03-04 RX ADMIN — LISINOPRIL 10 MG: 10 TABLET ORAL at 08:09

## 2019-03-04 RX ADMIN — METOPROLOL TARTRATE 25 MG: 25 TABLET ORAL at 21:00

## 2019-03-04 RX ADMIN — ENOXAPARIN SODIUM 40 MG: 40 INJECTION SUBCUTANEOUS at 08:06

## 2019-03-04 RX ADMIN — INSULIN LISPRO 3 UNITS: 100 INJECTION, SOLUTION INTRAVENOUS; SUBCUTANEOUS at 11:59

## 2019-03-04 RX ADMIN — INSULIN LISPRO 3 UNITS: 100 INJECTION, SOLUTION INTRAVENOUS; SUBCUTANEOUS at 06:54

## 2019-03-04 RX ADMIN — OXYBUTYNIN CHLORIDE 5 MG: 5 TABLET ORAL at 14:08

## 2019-03-04 RX ADMIN — ACETAMINOPHEN 325 MG: 325 TABLET ORAL at 03:51

## 2019-03-04 RX ADMIN — TRAZODONE HYDROCHLORIDE 50 MG: 50 TABLET ORAL at 21:00

## 2019-03-04 RX ADMIN — OXYBUTYNIN CHLORIDE 5 MG: 5 TABLET ORAL at 21:00

## 2019-03-04 RX ADMIN — INSULIN LISPRO 3 UNITS: 100 INJECTION, SOLUTION INTRAVENOUS; SUBCUTANEOUS at 17:41

## 2019-03-04 ASSESSMENT — PAIN SCALES - GENERAL
PAINLEVEL_OUTOF10: 0
PAINLEVEL_OUTOF10: 2
PAINLEVEL_OUTOF10: 2
PAINLEVEL_OUTOF10: 0
PAINLEVEL_OUTOF10: 2
PAINLEVEL_OUTOF10: 0

## 2019-03-05 LAB
GLUCOSE BLD-MCNC: 114 MG/DL (ref 70–99)
GLUCOSE BLD-MCNC: 152 MG/DL (ref 70–99)
GLUCOSE BLD-MCNC: 169 MG/DL (ref 70–99)
GLUCOSE BLD-MCNC: 219 MG/DL (ref 70–99)
PERFORMED ON: ABNORMAL

## 2019-03-05 PROCEDURE — 97116 GAIT TRAINING THERAPY: CPT

## 2019-03-05 PROCEDURE — 97530 THERAPEUTIC ACTIVITIES: CPT

## 2019-03-05 PROCEDURE — 1280000000 HC REHAB R&B

## 2019-03-05 PROCEDURE — 6360000002 HC RX W HCPCS: Performed by: PHYSICAL MEDICINE & REHABILITATION

## 2019-03-05 PROCEDURE — 6370000000 HC RX 637 (ALT 250 FOR IP): Performed by: PHYSICAL MEDICINE & REHABILITATION

## 2019-03-05 PROCEDURE — 97110 THERAPEUTIC EXERCISES: CPT

## 2019-03-05 PROCEDURE — 97535 SELF CARE MNGMENT TRAINING: CPT

## 2019-03-05 RX ADMIN — CIPROFLOXACIN HYDROCHLORIDE 500 MG: 500 TABLET, FILM COATED ORAL at 07:44

## 2019-03-05 RX ADMIN — TRAZODONE HYDROCHLORIDE 50 MG: 50 TABLET ORAL at 22:37

## 2019-03-05 RX ADMIN — METOPROLOL TARTRATE 25 MG: 25 TABLET ORAL at 07:44

## 2019-03-05 RX ADMIN — ATORVASTATIN CALCIUM 40 MG: 40 TABLET, FILM COATED ORAL at 22:37

## 2019-03-05 RX ADMIN — ENOXAPARIN SODIUM 40 MG: 40 INJECTION SUBCUTANEOUS at 07:44

## 2019-03-05 RX ADMIN — INSULIN LISPRO 2 UNITS: 100 INJECTION, SOLUTION INTRAVENOUS; SUBCUTANEOUS at 22:37

## 2019-03-05 RX ADMIN — OXYBUTYNIN CHLORIDE 5 MG: 5 TABLET ORAL at 14:29

## 2019-03-05 RX ADMIN — INSULIN LISPRO 6 UNITS: 100 INJECTION, SOLUTION INTRAVENOUS; SUBCUTANEOUS at 13:54

## 2019-03-05 RX ADMIN — OXYBUTYNIN CHLORIDE 5 MG: 5 TABLET ORAL at 07:44

## 2019-03-05 RX ADMIN — METOPROLOL TARTRATE 25 MG: 25 TABLET ORAL at 22:36

## 2019-03-05 RX ADMIN — OXYBUTYNIN CHLORIDE 5 MG: 5 TABLET ORAL at 22:36

## 2019-03-05 RX ADMIN — METFORMIN HYDROCHLORIDE 500 MG: 500 TABLET, EXTENDED RELEASE ORAL at 07:44

## 2019-03-05 RX ADMIN — DOCUSATE SODIUM 100 MG: 100 CAPSULE, LIQUID FILLED ORAL at 22:36

## 2019-03-05 RX ADMIN — LISINOPRIL 10 MG: 10 TABLET ORAL at 07:44

## 2019-03-05 RX ADMIN — DOCUSATE SODIUM 100 MG: 100 CAPSULE, LIQUID FILLED ORAL at 07:44

## 2019-03-05 RX ADMIN — CIPROFLOXACIN HYDROCHLORIDE 500 MG: 500 TABLET, FILM COATED ORAL at 22:36

## 2019-03-05 ASSESSMENT — PAIN SCALES - GENERAL
PAINLEVEL_OUTOF10: 0

## 2019-03-06 LAB
ALBUMIN SERPL-MCNC: 3.6 G/DL (ref 3.4–5)
ALP BLD-CCNC: 97 U/L (ref 40–129)
ALT SERPL-CCNC: 39 U/L (ref 10–40)
AMMONIA: 20 UMOL/L (ref 11–51)
ANION GAP SERPL CALCULATED.3IONS-SCNC: 12 MMOL/L (ref 3–16)
AST SERPL-CCNC: 43 U/L (ref 15–37)
BASOPHILS ABSOLUTE: 0 K/UL (ref 0–0.2)
BASOPHILS RELATIVE PERCENT: 0.5 %
BILIRUB SERPL-MCNC: 0.4 MG/DL (ref 0–1)
BILIRUBIN DIRECT: <0.2 MG/DL (ref 0–0.3)
BILIRUBIN, INDIRECT: ABNORMAL MG/DL (ref 0–1)
BUN BLDV-MCNC: 20 MG/DL (ref 7–20)
CALCIUM SERPL-MCNC: 8.7 MG/DL (ref 8.3–10.6)
CHLORIDE BLD-SCNC: 100 MMOL/L (ref 99–110)
CO2: 25 MMOL/L (ref 21–32)
CREAT SERPL-MCNC: 1.5 MG/DL (ref 0.6–1.2)
EOSINOPHILS ABSOLUTE: 0.4 K/UL (ref 0–0.6)
EOSINOPHILS RELATIVE PERCENT: 4.2 %
GFR AFRICAN AMERICAN: 40
GFR NON-AFRICAN AMERICAN: 33
GLUCOSE BLD-MCNC: 121 MG/DL (ref 70–99)
GLUCOSE BLD-MCNC: 149 MG/DL (ref 70–99)
GLUCOSE BLD-MCNC: 184 MG/DL (ref 70–99)
GLUCOSE BLD-MCNC: 187 MG/DL (ref 70–99)
GLUCOSE BLD-MCNC: 205 MG/DL (ref 70–99)
HCT VFR BLD CALC: 33.9 % (ref 36–48)
HEMOGLOBIN: 10.9 G/DL (ref 12–16)
LYMPHOCYTES ABSOLUTE: 1.1 K/UL (ref 1–5.1)
LYMPHOCYTES RELATIVE PERCENT: 11.5 %
MCH RBC QN AUTO: 27.6 PG (ref 26–34)
MCHC RBC AUTO-ENTMCNC: 32.2 G/DL (ref 31–36)
MCV RBC AUTO: 85.8 FL (ref 80–100)
MONOCYTES ABSOLUTE: 0.8 K/UL (ref 0–1.3)
MONOCYTES RELATIVE PERCENT: 8.7 %
NEUTROPHILS ABSOLUTE: 6.8 K/UL (ref 1.7–7.7)
NEUTROPHILS RELATIVE PERCENT: 75.1 %
PDW BLD-RTO: 14.5 % (ref 12.4–15.4)
PERFORMED ON: ABNORMAL
PLATELET # BLD: 197 K/UL (ref 135–450)
PMV BLD AUTO: 9.8 FL (ref 5–10.5)
POTASSIUM REFLEX MAGNESIUM: 5.2 MMOL/L (ref 3.5–5.1)
RBC # BLD: 3.95 M/UL (ref 4–5.2)
SODIUM BLD-SCNC: 137 MMOL/L (ref 136–145)
TOTAL PROTEIN: 7 G/DL (ref 6.4–8.2)
WBC # BLD: 9.1 K/UL (ref 4–11)

## 2019-03-06 PROCEDURE — 85025 COMPLETE CBC W/AUTO DIFF WBC: CPT

## 2019-03-06 PROCEDURE — 80076 HEPATIC FUNCTION PANEL: CPT

## 2019-03-06 PROCEDURE — 97530 THERAPEUTIC ACTIVITIES: CPT

## 2019-03-06 PROCEDURE — 6370000000 HC RX 637 (ALT 250 FOR IP): Performed by: PHYSICAL MEDICINE & REHABILITATION

## 2019-03-06 PROCEDURE — 2580000003 HC RX 258: Performed by: PHYSICAL MEDICINE & REHABILITATION

## 2019-03-06 PROCEDURE — 2580000003 HC RX 258

## 2019-03-06 PROCEDURE — 97110 THERAPEUTIC EXERCISES: CPT

## 2019-03-06 PROCEDURE — 82140 ASSAY OF AMMONIA: CPT

## 2019-03-06 PROCEDURE — 97535 SELF CARE MNGMENT TRAINING: CPT

## 2019-03-06 PROCEDURE — 97116 GAIT TRAINING THERAPY: CPT

## 2019-03-06 PROCEDURE — 6360000002 HC RX W HCPCS: Performed by: PHYSICAL MEDICINE & REHABILITATION

## 2019-03-06 PROCEDURE — 36415 COLL VENOUS BLD VENIPUNCTURE: CPT

## 2019-03-06 PROCEDURE — 1280000000 HC REHAB R&B

## 2019-03-06 PROCEDURE — 80048 BASIC METABOLIC PNL TOTAL CA: CPT

## 2019-03-06 RX ORDER — SODIUM CHLORIDE 9 MG/ML
INJECTION, SOLUTION INTRAVENOUS CONTINUOUS
Status: DISCONTINUED | OUTPATIENT
Start: 2019-03-06 | End: 2019-03-07

## 2019-03-06 RX ORDER — SODIUM CHLORIDE 0.9 % (FLUSH) 0.9 %
SYRINGE (ML) INJECTION
Status: COMPLETED
Start: 2019-03-06 | End: 2019-03-07

## 2019-03-06 RX ADMIN — CIPROFLOXACIN HYDROCHLORIDE 500 MG: 500 TABLET, FILM COATED ORAL at 07:39

## 2019-03-06 RX ADMIN — Medication 10 ML: at 20:28

## 2019-03-06 RX ADMIN — METOPROLOL TARTRATE 25 MG: 25 TABLET ORAL at 07:39

## 2019-03-06 RX ADMIN — METFORMIN HYDROCHLORIDE 500 MG: 500 TABLET, EXTENDED RELEASE ORAL at 07:39

## 2019-03-06 RX ADMIN — METOPROLOL TARTRATE 25 MG: 25 TABLET ORAL at 20:45

## 2019-03-06 RX ADMIN — Medication 1 APPLICATOR: at 20:22

## 2019-03-06 RX ADMIN — OXYBUTYNIN CHLORIDE 5 MG: 5 TABLET ORAL at 20:44

## 2019-03-06 RX ADMIN — DOCUSATE SODIUM 100 MG: 100 CAPSULE, LIQUID FILLED ORAL at 20:45

## 2019-03-06 RX ADMIN — INSULIN LISPRO 2 UNITS: 100 INJECTION, SOLUTION INTRAVENOUS; SUBCUTANEOUS at 20:45

## 2019-03-06 RX ADMIN — ONDANSETRON 4 MG: 4 TABLET, ORALLY DISINTEGRATING ORAL at 09:03

## 2019-03-06 RX ADMIN — TRAZODONE HYDROCHLORIDE 50 MG: 50 TABLET ORAL at 20:45

## 2019-03-06 RX ADMIN — DOCUSATE SODIUM 100 MG: 100 CAPSULE, LIQUID FILLED ORAL at 07:39

## 2019-03-06 RX ADMIN — OXYBUTYNIN CHLORIDE 5 MG: 5 TABLET ORAL at 07:39

## 2019-03-06 RX ADMIN — LISINOPRIL 10 MG: 10 TABLET ORAL at 07:39

## 2019-03-06 RX ADMIN — SODIUM CHLORIDE: 9 INJECTION, SOLUTION INTRAVENOUS at 20:30

## 2019-03-06 RX ADMIN — ATORVASTATIN CALCIUM 40 MG: 40 TABLET, FILM COATED ORAL at 20:45

## 2019-03-06 RX ADMIN — ENOXAPARIN SODIUM 40 MG: 40 INJECTION SUBCUTANEOUS at 07:38

## 2019-03-06 ASSESSMENT — PAIN SCALES - GENERAL: PAINLEVEL_OUTOF10: 0

## 2019-03-07 PROBLEM — M62.82 RHABDOMYOLYSIS: Status: RESOLVED | Noted: 2019-02-25 | Resolved: 2019-03-07

## 2019-03-07 LAB
ANION GAP SERPL CALCULATED.3IONS-SCNC: 10 MMOL/L (ref 3–16)
BASOPHILS ABSOLUTE: 0 K/UL (ref 0–0.2)
BASOPHILS RELATIVE PERCENT: 0.5 %
BUN BLDV-MCNC: 19 MG/DL (ref 7–20)
CALCIUM SERPL-MCNC: 8.2 MG/DL (ref 8.3–10.6)
CHLORIDE BLD-SCNC: 103 MMOL/L (ref 99–110)
CO2: 25 MMOL/L (ref 21–32)
CREAT SERPL-MCNC: 1.5 MG/DL (ref 0.6–1.2)
EOSINOPHILS ABSOLUTE: 0.3 K/UL (ref 0–0.6)
EOSINOPHILS RELATIVE PERCENT: 5.2 %
GFR AFRICAN AMERICAN: 40
GFR NON-AFRICAN AMERICAN: 33
GLUCOSE BLD-MCNC: 133 MG/DL (ref 70–99)
GLUCOSE BLD-MCNC: 142 MG/DL (ref 70–99)
GLUCOSE BLD-MCNC: 153 MG/DL (ref 70–99)
GLUCOSE BLD-MCNC: 159 MG/DL (ref 70–99)
GLUCOSE BLD-MCNC: 168 MG/DL (ref 70–99)
HCT VFR BLD CALC: 31.9 % (ref 36–48)
HEMOGLOBIN: 10.2 G/DL (ref 12–16)
LYMPHOCYTES ABSOLUTE: 0.9 K/UL (ref 1–5.1)
LYMPHOCYTES RELATIVE PERCENT: 14.3 %
MCH RBC QN AUTO: 27.3 PG (ref 26–34)
MCHC RBC AUTO-ENTMCNC: 32.1 G/DL (ref 31–36)
MCV RBC AUTO: 85 FL (ref 80–100)
MONOCYTES ABSOLUTE: 0.6 K/UL (ref 0–1.3)
MONOCYTES RELATIVE PERCENT: 9.7 %
NEUTROPHILS ABSOLUTE: 4.6 K/UL (ref 1.7–7.7)
NEUTROPHILS RELATIVE PERCENT: 70.3 %
PDW BLD-RTO: 14.4 % (ref 12.4–15.4)
PERFORMED ON: ABNORMAL
PLATELET # BLD: 159 K/UL (ref 135–450)
PMV BLD AUTO: 9.8 FL (ref 5–10.5)
POTASSIUM REFLEX MAGNESIUM: 4.5 MMOL/L (ref 3.5–5.1)
RBC # BLD: 3.75 M/UL (ref 4–5.2)
SODIUM BLD-SCNC: 138 MMOL/L (ref 136–145)
TOTAL CK: 41 U/L (ref 26–192)
WBC # BLD: 6.6 K/UL (ref 4–11)

## 2019-03-07 PROCEDURE — 6370000000 HC RX 637 (ALT 250 FOR IP): Performed by: PHYSICAL MEDICINE & REHABILITATION

## 2019-03-07 PROCEDURE — 97530 THERAPEUTIC ACTIVITIES: CPT

## 2019-03-07 PROCEDURE — 80048 BASIC METABOLIC PNL TOTAL CA: CPT

## 2019-03-07 PROCEDURE — 2580000003 HC RX 258

## 2019-03-07 PROCEDURE — 6360000002 HC RX W HCPCS: Performed by: PHYSICAL MEDICINE & REHABILITATION

## 2019-03-07 PROCEDURE — 97535 SELF CARE MNGMENT TRAINING: CPT

## 2019-03-07 PROCEDURE — 1280000000 HC REHAB R&B

## 2019-03-07 PROCEDURE — 82550 ASSAY OF CK (CPK): CPT

## 2019-03-07 PROCEDURE — 36415 COLL VENOUS BLD VENIPUNCTURE: CPT

## 2019-03-07 PROCEDURE — 2580000003 HC RX 258: Performed by: PHYSICAL MEDICINE & REHABILITATION

## 2019-03-07 PROCEDURE — 85025 COMPLETE CBC W/AUTO DIFF WBC: CPT

## 2019-03-07 RX ORDER — METOPROLOL TARTRATE 50 MG/1
50 TABLET, FILM COATED ORAL 2 TIMES DAILY
Qty: 60 TABLET | Refills: 0 | Status: SHIPPED | OUTPATIENT
Start: 2019-03-07 | End: 2019-05-08 | Stop reason: SDUPTHER

## 2019-03-07 RX ORDER — SODIUM CHLORIDE 9 MG/ML
INJECTION, SOLUTION INTRAVENOUS ONCE
Status: COMPLETED | OUTPATIENT
Start: 2019-03-07 | End: 2019-03-07

## 2019-03-07 RX ORDER — SODIUM CHLORIDE 0.9 % (FLUSH) 0.9 %
SYRINGE (ML) INJECTION
Status: DISPENSED
Start: 2019-03-07 | End: 2019-03-07

## 2019-03-07 RX ORDER — METOPROLOL TARTRATE 50 MG/1
50 TABLET, FILM COATED ORAL 2 TIMES DAILY
Status: DISCONTINUED | OUTPATIENT
Start: 2019-03-07 | End: 2019-03-08 | Stop reason: HOSPADM

## 2019-03-07 RX ADMIN — Medication: at 08:34

## 2019-03-07 RX ADMIN — METOPROLOL TARTRATE 50 MG: 50 TABLET ORAL at 21:54

## 2019-03-07 RX ADMIN — OXYBUTYNIN CHLORIDE 5 MG: 5 TABLET ORAL at 21:54

## 2019-03-07 RX ADMIN — TRAZODONE HYDROCHLORIDE 50 MG: 50 TABLET ORAL at 21:54

## 2019-03-07 RX ADMIN — LISINOPRIL 10 MG: 10 TABLET ORAL at 08:32

## 2019-03-07 RX ADMIN — OXYBUTYNIN CHLORIDE 5 MG: 5 TABLET ORAL at 08:31

## 2019-03-07 RX ADMIN — INSULIN LISPRO 2 UNITS: 100 INJECTION, SOLUTION INTRAVENOUS; SUBCUTANEOUS at 21:55

## 2019-03-07 RX ADMIN — Medication 5 MG: at 21:54

## 2019-03-07 RX ADMIN — ATORVASTATIN CALCIUM 40 MG: 40 TABLET, FILM COATED ORAL at 21:54

## 2019-03-07 RX ADMIN — ENOXAPARIN SODIUM 40 MG: 40 INJECTION SUBCUTANEOUS at 08:32

## 2019-03-07 RX ADMIN — OXYBUTYNIN CHLORIDE 5 MG: 5 TABLET ORAL at 13:01

## 2019-03-07 RX ADMIN — METFORMIN HYDROCHLORIDE 500 MG: 500 TABLET, EXTENDED RELEASE ORAL at 08:32

## 2019-03-07 RX ADMIN — SODIUM CHLORIDE: 9 INJECTION, SOLUTION INTRAVENOUS at 11:20

## 2019-03-07 RX ADMIN — METOPROLOL TARTRATE 25 MG: 25 TABLET ORAL at 08:31

## 2019-03-07 RX ADMIN — INSULIN LISPRO 3 UNITS: 100 INJECTION, SOLUTION INTRAVENOUS; SUBCUTANEOUS at 17:16

## 2019-03-07 RX ADMIN — Medication 1 APPLICATOR: at 21:55

## 2019-03-07 RX ADMIN — DOCUSATE SODIUM 100 MG: 100 CAPSULE, LIQUID FILLED ORAL at 21:54

## 2019-03-07 RX ADMIN — INSULIN LISPRO 3 UNITS: 100 INJECTION, SOLUTION INTRAVENOUS; SUBCUTANEOUS at 13:01

## 2019-03-07 ASSESSMENT — PAIN SCALES - GENERAL
PAINLEVEL_OUTOF10: 0

## 2019-03-08 VITALS
DIASTOLIC BLOOD PRESSURE: 63 MMHG | RESPIRATION RATE: 16 BRPM | SYSTOLIC BLOOD PRESSURE: 145 MMHG | BODY MASS INDEX: 23.94 KG/M2 | WEIGHT: 143.7 LBS | OXYGEN SATURATION: 97 % | TEMPERATURE: 97.2 F | HEART RATE: 62 BPM | HEIGHT: 65 IN

## 2019-03-08 LAB
ANION GAP SERPL CALCULATED.3IONS-SCNC: 11 MMOL/L (ref 3–16)
BILIRUBIN URINE: NEGATIVE
BLOOD, URINE: ABNORMAL
BUN BLDV-MCNC: 16 MG/DL (ref 7–20)
CALCIUM SERPL-MCNC: 8.5 MG/DL (ref 8.3–10.6)
CHLORIDE BLD-SCNC: 101 MMOL/L (ref 99–110)
CLARITY: CLEAR
CO2: 23 MMOL/L (ref 21–32)
COLOR: YELLOW
CREAT SERPL-MCNC: 1.3 MG/DL (ref 0.6–1.2)
EPITHELIAL CELLS, UA: NORMAL /HPF
GFR AFRICAN AMERICAN: 47
GFR NON-AFRICAN AMERICAN: 39
GLUCOSE BLD-MCNC: 116 MG/DL (ref 70–99)
GLUCOSE BLD-MCNC: 130 MG/DL (ref 70–99)
GLUCOSE BLD-MCNC: 153 MG/DL (ref 70–99)
GLUCOSE BLD-MCNC: 171 MG/DL (ref 70–99)
GLUCOSE URINE: NEGATIVE MG/DL
KETONES, URINE: NEGATIVE MG/DL
LEUKOCYTE ESTERASE, URINE: NEGATIVE
MICROSCOPIC EXAMINATION: YES
NITRITE, URINE: NEGATIVE
PERFORMED ON: ABNORMAL
PH UA: 5.5 (ref 5–8)
POTASSIUM SERPL-SCNC: 4.7 MMOL/L (ref 3.5–5.1)
PROTEIN UA: 30 MG/DL
RBC UA: NORMAL /HPF (ref 0–2)
SODIUM BLD-SCNC: 135 MMOL/L (ref 136–145)
SPECIFIC GRAVITY UA: 1.01 (ref 1–1.03)
URINE TYPE: ABNORMAL
UROBILINOGEN, URINE: 0.2 E.U./DL
WBC UA: NORMAL /HPF (ref 0–5)

## 2019-03-08 PROCEDURE — 6370000000 HC RX 637 (ALT 250 FOR IP): Performed by: PHYSICAL MEDICINE & REHABILITATION

## 2019-03-08 PROCEDURE — 97535 SELF CARE MNGMENT TRAINING: CPT

## 2019-03-08 PROCEDURE — 80048 BASIC METABOLIC PNL TOTAL CA: CPT

## 2019-03-08 PROCEDURE — 36415 COLL VENOUS BLD VENIPUNCTURE: CPT

## 2019-03-08 PROCEDURE — 97530 THERAPEUTIC ACTIVITIES: CPT

## 2019-03-08 PROCEDURE — 81001 URINALYSIS AUTO W/SCOPE: CPT

## 2019-03-08 PROCEDURE — 97110 THERAPEUTIC EXERCISES: CPT

## 2019-03-08 PROCEDURE — 6360000002 HC RX W HCPCS: Performed by: PHYSICAL MEDICINE & REHABILITATION

## 2019-03-08 PROCEDURE — 97116 GAIT TRAINING THERAPY: CPT

## 2019-03-08 RX ADMIN — OXYBUTYNIN CHLORIDE 5 MG: 5 TABLET ORAL at 08:43

## 2019-03-08 RX ADMIN — METFORMIN HYDROCHLORIDE 500 MG: 500 TABLET, EXTENDED RELEASE ORAL at 08:43

## 2019-03-08 RX ADMIN — DOCUSATE SODIUM 100 MG: 100 CAPSULE, LIQUID FILLED ORAL at 08:43

## 2019-03-08 RX ADMIN — OXYBUTYNIN CHLORIDE 5 MG: 5 TABLET ORAL at 19:20

## 2019-03-08 RX ADMIN — OXYBUTYNIN CHLORIDE 5 MG: 5 TABLET ORAL at 14:09

## 2019-03-08 RX ADMIN — ATORVASTATIN CALCIUM 40 MG: 40 TABLET, FILM COATED ORAL at 19:19

## 2019-03-08 RX ADMIN — METOPROLOL TARTRATE 50 MG: 50 TABLET ORAL at 19:20

## 2019-03-08 RX ADMIN — METOPROLOL TARTRATE 50 MG: 50 TABLET ORAL at 08:43

## 2019-03-08 RX ADMIN — ENOXAPARIN SODIUM 30 MG: 30 INJECTION SUBCUTANEOUS at 08:43

## 2019-03-08 RX ADMIN — INSULIN LISPRO 3 UNITS: 100 INJECTION, SOLUTION INTRAVENOUS; SUBCUTANEOUS at 12:23

## 2019-03-08 ASSESSMENT — PAIN SCALES - GENERAL
PAINLEVEL_OUTOF10: 0

## 2019-03-09 ENCOUNTER — CARE COORDINATION (OUTPATIENT)
Dept: CASE MANAGEMENT | Age: 84
End: 2019-03-09

## 2019-03-11 ENCOUNTER — CARE COORDINATION (OUTPATIENT)
Dept: CASE MANAGEMENT | Age: 84
End: 2019-03-11

## 2019-03-11 ENCOUNTER — TELEPHONE (OUTPATIENT)
Dept: INTERNAL MEDICINE CLINIC | Age: 84
End: 2019-03-11

## 2019-03-11 NOTE — TELEPHONE ENCOUNTER
[facility] asking for verbal orders for patient:  PT for 2X week for 1 month.  [facility]

## 2019-03-13 ENCOUNTER — TELEPHONE (OUTPATIENT)
Dept: INTERNAL MEDICINE CLINIC | Age: 84
End: 2019-03-13

## 2019-03-13 ENCOUNTER — HOSPITAL ENCOUNTER (OUTPATIENT)
Age: 84
Setting detail: SPECIMEN
Discharge: HOME | End: 2019-03-13
Payer: MEDICARE

## 2019-03-13 LAB
ANION GAP SERPL CALCULATED.3IONS-SCNC: 13 MMOL/L (ref 3–16)
BUN BLDV-MCNC: 18 MG/DL (ref 7–20)
CALCIUM SERPL-MCNC: 9.4 MG/DL (ref 8.3–10.6)
CHLORIDE BLD-SCNC: 100 MMOL/L (ref 99–110)
CO2: 28 MMOL/L (ref 21–32)
CREAT SERPL-MCNC: 1.3 MG/DL (ref 0.6–1.2)
GFR AFRICAN AMERICAN: 47
GFR NON-AFRICAN AMERICAN: 39
GLUCOSE BLD-MCNC: 174 MG/DL (ref 70–99)
POTASSIUM SERPL-SCNC: 4.7 MMOL/L (ref 3.5–5.1)
SODIUM BLD-SCNC: 141 MMOL/L (ref 136–145)

## 2019-03-13 PROCEDURE — 36415 COLL VENOUS BLD VENIPUNCTURE: CPT

## 2019-03-13 PROCEDURE — 80048 BASIC METABOLIC PNL TOTAL CA: CPT

## 2019-03-15 ENCOUNTER — OFFICE VISIT (OUTPATIENT)
Dept: INTERNAL MEDICINE CLINIC | Age: 84
End: 2019-03-15
Payer: MEDICARE

## 2019-03-15 VITALS
OXYGEN SATURATION: 98 % | DIASTOLIC BLOOD PRESSURE: 70 MMHG | WEIGHT: 142 LBS | BODY MASS INDEX: 23.63 KG/M2 | HEART RATE: 72 BPM | SYSTOLIC BLOOD PRESSURE: 122 MMHG

## 2019-03-15 DIAGNOSIS — N17.9 AKI (ACUTE KIDNEY INJURY) (HCC): Primary | ICD-10-CM

## 2019-03-15 DIAGNOSIS — E11.9 TYPE 2 DIABETES MELLITUS WITHOUT COMPLICATION, WITHOUT LONG-TERM CURRENT USE OF INSULIN (HCC): ICD-10-CM

## 2019-03-15 DIAGNOSIS — E44.0 MODERATE MALNUTRITION (HCC): ICD-10-CM

## 2019-03-15 DIAGNOSIS — R53.81 PHYSICAL DECONDITIONING: ICD-10-CM

## 2019-03-15 DIAGNOSIS — R26.81 UNSTABLE GAIT: ICD-10-CM

## 2019-03-15 DIAGNOSIS — Z09 HOSPITAL DISCHARGE FOLLOW-UP: ICD-10-CM

## 2019-03-15 DIAGNOSIS — I10 ESSENTIAL HYPERTENSION: ICD-10-CM

## 2019-03-15 PROCEDURE — 99495 TRANSJ CARE MGMT MOD F2F 14D: CPT | Performed by: FAMILY MEDICINE

## 2019-03-15 PROCEDURE — 1111F DSCHRG MED/CURRENT MED MERGE: CPT | Performed by: FAMILY MEDICINE

## 2019-03-15 ASSESSMENT — PATIENT HEALTH QUESTIONNAIRE - PHQ9
SUM OF ALL RESPONSES TO PHQ QUESTIONS 1-9: 0
SUM OF ALL RESPONSES TO PHQ9 QUESTIONS 1 & 2: 0
2. FEELING DOWN, DEPRESSED OR HOPELESS: 0
SUM OF ALL RESPONSES TO PHQ QUESTIONS 1-9: 0
1. LITTLE INTEREST OR PLEASURE IN DOING THINGS: 0

## 2019-03-16 LAB
ANION GAP SERPL CALCULATED.3IONS-SCNC: 14 MMOL/L (ref 3–16)
BUN BLDV-MCNC: 21 MG/DL (ref 7–20)
CALCIUM SERPL-MCNC: 9 MG/DL (ref 8.3–10.6)
CHLORIDE BLD-SCNC: 99 MMOL/L (ref 99–110)
CO2: 27 MMOL/L (ref 21–32)
CREAT SERPL-MCNC: 1.2 MG/DL (ref 0.6–1.2)
GFR AFRICAN AMERICAN: 52
GFR NON-AFRICAN AMERICAN: 43
GLUCOSE BLD-MCNC: 232 MG/DL (ref 70–99)
POTASSIUM SERPL-SCNC: 4.6 MMOL/L (ref 3.5–5.1)
SODIUM BLD-SCNC: 140 MMOL/L (ref 136–145)

## 2019-03-17 DIAGNOSIS — E11.9 TYPE 2 DIABETES MELLITUS WITHOUT COMPLICATION, WITHOUT LONG-TERM CURRENT USE OF INSULIN (HCC): ICD-10-CM

## 2019-03-17 RX ORDER — METFORMIN HYDROCHLORIDE 500 MG/1
TABLET, EXTENDED RELEASE ORAL
Qty: 30 TABLET | Refills: 2 | Status: SHIPPED | OUTPATIENT
Start: 2019-03-17 | End: 2019-05-08

## 2019-03-18 ENCOUNTER — HOSPITAL ENCOUNTER (OUTPATIENT)
Age: 84
Setting detail: SPECIMEN
Discharge: HOME | DRG: 689 | End: 2019-03-18
Payer: MEDICARE

## 2019-03-18 LAB
ANION GAP SERPL CALCULATED.3IONS-SCNC: 14 MMOL/L (ref 3–16)
BUN BLDV-MCNC: 23 MG/DL (ref 7–20)
CALCIUM SERPL-MCNC: 9.3 MG/DL (ref 8.3–10.6)
CHLORIDE BLD-SCNC: 102 MMOL/L (ref 99–110)
CO2: 27 MMOL/L (ref 21–32)
CREAT SERPL-MCNC: 1.1 MG/DL (ref 0.6–1.2)
GFR AFRICAN AMERICAN: 58
GFR NON-AFRICAN AMERICAN: 48
GLUCOSE BLD-MCNC: 185 MG/DL (ref 70–99)
POTASSIUM SERPL-SCNC: 4 MMOL/L (ref 3.5–5.1)
SODIUM BLD-SCNC: 143 MMOL/L (ref 136–145)

## 2019-03-18 PROCEDURE — 36415 COLL VENOUS BLD VENIPUNCTURE: CPT

## 2019-03-18 PROCEDURE — 80048 BASIC METABOLIC PNL TOTAL CA: CPT

## 2019-03-18 ASSESSMENT — ENCOUNTER SYMPTOMS
CONSTIPATION: 0
NAUSEA: 0
SORE THROAT: 0
DIARRHEA: 0
WHEEZING: 0
ABDOMINAL PAIN: 0
COUGH: 0
CHOKING: 0
RHINORRHEA: 0
TROUBLE SWALLOWING: 0
VOMITING: 0
BACK PAIN: 1
SHORTNESS OF BREATH: 0

## 2019-03-19 ENCOUNTER — TELEPHONE (OUTPATIENT)
Dept: INTERNAL MEDICINE CLINIC | Age: 84
End: 2019-03-19

## 2019-03-19 DIAGNOSIS — M51.36 DDD (DEGENERATIVE DISC DISEASE), LUMBAR: Primary | ICD-10-CM

## 2019-03-21 ENCOUNTER — APPOINTMENT (OUTPATIENT)
Dept: CT IMAGING | Age: 84
DRG: 689 | End: 2019-03-21
Payer: MEDICARE

## 2019-03-21 ENCOUNTER — APPOINTMENT (OUTPATIENT)
Dept: GENERAL RADIOLOGY | Age: 84
DRG: 689 | End: 2019-03-21
Payer: MEDICARE

## 2019-03-21 ENCOUNTER — HOSPITAL ENCOUNTER (INPATIENT)
Age: 84
LOS: 6 days | Discharge: NURSING, REHAB OR LONG-TERM CARE | DRG: 689 | End: 2019-03-27
Attending: EMERGENCY MEDICINE | Admitting: INTERNAL MEDICINE
Payer: MEDICARE

## 2019-03-21 DIAGNOSIS — N39.0 URINARY TRACT INFECTION WITHOUT HEMATURIA, SITE UNSPECIFIED: ICD-10-CM

## 2019-03-21 DIAGNOSIS — G93.40 ACUTE ENCEPHALOPATHY: Primary | ICD-10-CM

## 2019-03-21 DIAGNOSIS — R47.01 APHASIA: ICD-10-CM

## 2019-03-21 LAB
A/G RATIO: 1.1 (ref 1.1–2.2)
ALBUMIN SERPL-MCNC: 4.1 G/DL (ref 3.4–5)
ALP BLD-CCNC: 101 U/L (ref 40–129)
ALT SERPL-CCNC: 24 U/L (ref 10–40)
ANION GAP SERPL CALCULATED.3IONS-SCNC: 11 MMOL/L (ref 3–16)
AST SERPL-CCNC: 28 U/L (ref 15–37)
BACTERIA: ABNORMAL /HPF
BASOPHILS ABSOLUTE: 0.1 K/UL (ref 0–0.2)
BASOPHILS RELATIVE PERCENT: 0.7 %
BILIRUB SERPL-MCNC: 0.8 MG/DL (ref 0–1)
BILIRUBIN URINE: NEGATIVE
BLOOD, URINE: ABNORMAL
BUN BLDV-MCNC: 30 MG/DL (ref 7–20)
CALCIUM SERPL-MCNC: 9.4 MG/DL (ref 8.3–10.6)
CHLORIDE BLD-SCNC: 97 MMOL/L (ref 99–110)
CLARITY: ABNORMAL
CO2: 28 MMOL/L (ref 21–32)
COLOR: ABNORMAL
CREAT SERPL-MCNC: 1.2 MG/DL (ref 0.6–1.2)
EOSINOPHILS ABSOLUTE: 0.3 K/UL (ref 0–0.6)
EOSINOPHILS RELATIVE PERCENT: 4.3 %
EPITHELIAL CELLS, UA: ABNORMAL /HPF
GFR AFRICAN AMERICAN: 52
GFR NON-AFRICAN AMERICAN: 43
GLOBULIN: 3.9 G/DL
GLUCOSE BLD-MCNC: 208 MG/DL (ref 70–99)
GLUCOSE URINE: NEGATIVE MG/DL
HCT VFR BLD CALC: 38.4 % (ref 36–48)
HEMOGLOBIN: 12.6 G/DL (ref 12–16)
INR BLD: 1.01 (ref 0.86–1.14)
KETONES, URINE: NEGATIVE MG/DL
LACTIC ACID: 0.9 MMOL/L (ref 0.4–2)
LEUKOCYTE ESTERASE, URINE: ABNORMAL
LYMPHOCYTES ABSOLUTE: 1.2 K/UL (ref 1–5.1)
LYMPHOCYTES RELATIVE PERCENT: 15.9 %
MCH RBC QN AUTO: 27.8 PG (ref 26–34)
MCHC RBC AUTO-ENTMCNC: 32.9 G/DL (ref 31–36)
MCV RBC AUTO: 84.3 FL (ref 80–100)
MICROSCOPIC EXAMINATION: YES
MONOCYTES ABSOLUTE: 0.7 K/UL (ref 0–1.3)
MONOCYTES RELATIVE PERCENT: 9.2 %
NEUTROPHILS ABSOLUTE: 5.5 K/UL (ref 1.7–7.7)
NEUTROPHILS RELATIVE PERCENT: 69.9 %
NITRITE, URINE: POSITIVE
PDW BLD-RTO: 14.3 % (ref 12.4–15.4)
PH UA: 6.5 (ref 5–8)
PLATELET # BLD: 251 K/UL (ref 135–450)
PMV BLD AUTO: 10.1 FL (ref 5–10.5)
POTASSIUM SERPL-SCNC: 4.7 MMOL/L (ref 3.5–5.1)
PROTEIN UA: 100 MG/DL
PROTHROMBIN TIME: 11.5 SEC (ref 9.8–13)
RBC # BLD: 4.55 M/UL (ref 4–5.2)
RBC UA: ABNORMAL /HPF (ref 0–2)
SODIUM BLD-SCNC: 136 MMOL/L (ref 136–145)
SPECIFIC GRAVITY UA: 1.01 (ref 1–1.03)
TOTAL PROTEIN: 8 G/DL (ref 6.4–8.2)
URINE REFLEX TO CULTURE: YES
URINE TYPE: ABNORMAL
UROBILINOGEN, URINE: 0.2 E.U./DL
WBC # BLD: 7.8 K/UL (ref 4–11)
WBC UA: >100 /HPF (ref 0–5)

## 2019-03-21 PROCEDURE — 87086 URINE CULTURE/COLONY COUNT: CPT

## 2019-03-21 PROCEDURE — 96375 TX/PRO/DX INJ NEW DRUG ADDON: CPT

## 2019-03-21 PROCEDURE — 70450 CT HEAD/BRAIN W/O DYE: CPT

## 2019-03-21 PROCEDURE — 85025 COMPLETE CBC W/AUTO DIFF WBC: CPT

## 2019-03-21 PROCEDURE — 1200000000 HC SEMI PRIVATE

## 2019-03-21 PROCEDURE — 71045 X-RAY EXAM CHEST 1 VIEW: CPT

## 2019-03-21 PROCEDURE — 93005 ELECTROCARDIOGRAM TRACING: CPT | Performed by: EMERGENCY MEDICINE

## 2019-03-21 PROCEDURE — 6360000002 HC RX W HCPCS: Performed by: EMERGENCY MEDICINE

## 2019-03-21 PROCEDURE — 2700000000 HC OXYGEN THERAPY PER DAY

## 2019-03-21 PROCEDURE — 96365 THER/PROPH/DIAG IV INF INIT: CPT

## 2019-03-21 PROCEDURE — 81001 URINALYSIS AUTO W/SCOPE: CPT

## 2019-03-21 PROCEDURE — 87040 BLOOD CULTURE FOR BACTERIA: CPT

## 2019-03-21 PROCEDURE — 2580000003 HC RX 258: Performed by: EMERGENCY MEDICINE

## 2019-03-21 PROCEDURE — 99285 EMERGENCY DEPT VISIT HI MDM: CPT

## 2019-03-21 PROCEDURE — 6360000002 HC RX W HCPCS

## 2019-03-21 PROCEDURE — 87077 CULTURE AEROBIC IDENTIFY: CPT

## 2019-03-21 PROCEDURE — 85610 PROTHROMBIN TIME: CPT

## 2019-03-21 PROCEDURE — 2580000003 HC RX 258: Performed by: INTERNAL MEDICINE

## 2019-03-21 PROCEDURE — 94761 N-INVAS EAR/PLS OXIMETRY MLT: CPT

## 2019-03-21 PROCEDURE — 83605 ASSAY OF LACTIC ACID: CPT

## 2019-03-21 PROCEDURE — 80053 COMPREHEN METABOLIC PANEL: CPT

## 2019-03-21 PROCEDURE — 87186 SC STD MICRODIL/AGAR DIL: CPT

## 2019-03-21 RX ORDER — SODIUM CHLORIDE 0.9 % (FLUSH) 0.9 %
10 SYRINGE (ML) INJECTION PRN
Status: DISCONTINUED | OUTPATIENT
Start: 2019-03-21 | End: 2019-03-27 | Stop reason: HOSPADM

## 2019-03-21 RX ORDER — NICOTINE POLACRILEX 4 MG
15 LOZENGE BUCCAL PRN
Status: DISCONTINUED | OUTPATIENT
Start: 2019-03-21 | End: 2019-03-27 | Stop reason: HOSPADM

## 2019-03-21 RX ORDER — ONDANSETRON 2 MG/ML
4 INJECTION INTRAMUSCULAR; INTRAVENOUS ONCE
Status: COMPLETED | OUTPATIENT
Start: 2019-03-21 | End: 2019-03-21

## 2019-03-21 RX ORDER — DEXTROSE MONOHYDRATE 50 MG/ML
100 INJECTION, SOLUTION INTRAVENOUS PRN
Status: DISCONTINUED | OUTPATIENT
Start: 2019-03-21 | End: 2019-03-27 | Stop reason: HOSPADM

## 2019-03-21 RX ORDER — METOPROLOL TARTRATE 50 MG/1
50 TABLET, FILM COATED ORAL 2 TIMES DAILY
Status: DISCONTINUED | OUTPATIENT
Start: 2019-03-22 | End: 2019-03-27 | Stop reason: HOSPADM

## 2019-03-21 RX ORDER — ATORVASTATIN CALCIUM 40 MG/1
40 TABLET, FILM COATED ORAL DAILY
Status: DISCONTINUED | OUTPATIENT
Start: 2019-03-22 | End: 2019-03-27 | Stop reason: HOSPADM

## 2019-03-21 RX ORDER — SODIUM CHLORIDE 9 MG/ML
INJECTION, SOLUTION INTRAVENOUS CONTINUOUS
Status: DISCONTINUED | OUTPATIENT
Start: 2019-03-22 | End: 2019-03-24

## 2019-03-21 RX ORDER — ONDANSETRON 2 MG/ML
4 INJECTION INTRAMUSCULAR; INTRAVENOUS EVERY 6 HOURS PRN
Status: DISCONTINUED | OUTPATIENT
Start: 2019-03-21 | End: 2019-03-27 | Stop reason: HOSPADM

## 2019-03-21 RX ORDER — UREA 10 %
3 LOTION (ML) TOPICAL NIGHTLY PRN
Status: DISCONTINUED | OUTPATIENT
Start: 2019-03-22 | End: 2019-03-27 | Stop reason: HOSPADM

## 2019-03-21 RX ORDER — SODIUM CHLORIDE 0.9 % (FLUSH) 0.9 %
10 SYRINGE (ML) INJECTION EVERY 12 HOURS SCHEDULED
Status: DISCONTINUED | OUTPATIENT
Start: 2019-03-22 | End: 2019-03-27 | Stop reason: HOSPADM

## 2019-03-21 RX ORDER — 0.9 % SODIUM CHLORIDE 0.9 %
1000 INTRAVENOUS SOLUTION INTRAVENOUS ONCE
Status: COMPLETED | OUTPATIENT
Start: 2019-03-21 | End: 2019-03-22

## 2019-03-21 RX ORDER — DEXTROSE MONOHYDRATE 25 G/50ML
12.5 INJECTION, SOLUTION INTRAVENOUS PRN
Status: DISCONTINUED | OUTPATIENT
Start: 2019-03-21 | End: 2019-03-27 | Stop reason: HOSPADM

## 2019-03-21 RX ORDER — ONDANSETRON 2 MG/ML
INJECTION INTRAMUSCULAR; INTRAVENOUS
Status: COMPLETED
Start: 2019-03-21 | End: 2019-03-21

## 2019-03-21 RX ORDER — ASCORBIC ACID 1000 MG
1 TABLET ORAL DAILY
Status: DISCONTINUED | OUTPATIENT
Start: 2019-03-22 | End: 2019-03-21

## 2019-03-21 RX ORDER — OXYBUTYNIN CHLORIDE 5 MG/1
2.5 TABLET ORAL 2 TIMES DAILY
Status: DISCONTINUED | OUTPATIENT
Start: 2019-03-22 | End: 2019-03-27 | Stop reason: HOSPADM

## 2019-03-21 RX ADMIN — CEFTRIAXONE SODIUM 1 G: 1 INJECTION, POWDER, FOR SOLUTION INTRAMUSCULAR; INTRAVENOUS at 22:26

## 2019-03-21 RX ADMIN — ONDANSETRON 4 MG: 2 INJECTION INTRAMUSCULAR; INTRAVENOUS at 21:04

## 2019-03-21 RX ADMIN — SODIUM CHLORIDE: 9 INJECTION, SOLUTION INTRAVENOUS at 23:46

## 2019-03-21 RX ADMIN — SODIUM CHLORIDE 1000 ML: 9 INJECTION, SOLUTION INTRAVENOUS at 22:26

## 2019-03-21 NOTE — ED NOTES
Dr. Cristofer Rodriguez at preforming NIHSS on patient at registration desk. MD states out of the window.  Last known normal 48 hours ago     Deion Cool RN  03/21/19 [id number]

## 2019-03-22 LAB
ANION GAP SERPL CALCULATED.3IONS-SCNC: 11 MMOL/L (ref 3–16)
BUN BLDV-MCNC: 23 MG/DL (ref 7–20)
CALCIUM SERPL-MCNC: 8.9 MG/DL (ref 8.3–10.6)
CHLORIDE BLD-SCNC: 103 MMOL/L (ref 99–110)
CO2: 27 MMOL/L (ref 21–32)
CREAT SERPL-MCNC: 1.1 MG/DL (ref 0.6–1.2)
EKG ATRIAL RATE: 69 BPM
EKG DIAGNOSIS: NORMAL
EKG P AXIS: 55 DEGREES
EKG P-R INTERVAL: 148 MS
EKG Q-T INTERVAL: 422 MS
EKG QRS DURATION: 74 MS
EKG QTC CALCULATION (BAZETT): 452 MS
EKG R AXIS: -7 DEGREES
EKG T AXIS: 76 DEGREES
EKG VENTRICULAR RATE: 69 BPM
GFR AFRICAN AMERICAN: 58
GFR NON-AFRICAN AMERICAN: 48
GLUCOSE BLD-MCNC: 170 MG/DL (ref 70–99)
GLUCOSE BLD-MCNC: 181 MG/DL (ref 70–99)
GLUCOSE BLD-MCNC: 183 MG/DL (ref 70–99)
GLUCOSE BLD-MCNC: 196 MG/DL (ref 70–99)
GLUCOSE BLD-MCNC: 208 MG/DL (ref 70–99)
GLUCOSE BLD-MCNC: 240 MG/DL (ref 70–99)
PERFORMED ON: ABNORMAL
POTASSIUM REFLEX MAGNESIUM: 4.4 MMOL/L (ref 3.5–5.1)
SODIUM BLD-SCNC: 141 MMOL/L (ref 136–145)

## 2019-03-22 PROCEDURE — 93010 ELECTROCARDIOGRAM REPORT: CPT | Performed by: INTERNAL MEDICINE

## 2019-03-22 PROCEDURE — 97163 PT EVAL HIGH COMPLEX 45 MIN: CPT

## 2019-03-22 PROCEDURE — 2580000003 HC RX 258: Performed by: INTERNAL MEDICINE

## 2019-03-22 PROCEDURE — 97116 GAIT TRAINING THERAPY: CPT

## 2019-03-22 PROCEDURE — 6370000000 HC RX 637 (ALT 250 FOR IP): Performed by: INTERNAL MEDICINE

## 2019-03-22 PROCEDURE — 1200000000 HC SEMI PRIVATE

## 2019-03-22 PROCEDURE — 6360000002 HC RX W HCPCS: Performed by: INTERNAL MEDICINE

## 2019-03-22 PROCEDURE — 80048 BASIC METABOLIC PNL TOTAL CA: CPT

## 2019-03-22 PROCEDURE — 36415 COLL VENOUS BLD VENIPUNCTURE: CPT

## 2019-03-22 RX ADMIN — Medication 10 ML: at 21:54

## 2019-03-22 RX ADMIN — METOPROLOL TARTRATE 50 MG: 50 TABLET ORAL at 00:29

## 2019-03-22 RX ADMIN — OXYBUTYNIN CHLORIDE 2.5 MG: 5 TABLET ORAL at 08:31

## 2019-03-22 RX ADMIN — INSULIN LISPRO 1 UNITS: 100 INJECTION, SOLUTION INTRAVENOUS; SUBCUTANEOUS at 08:32

## 2019-03-22 RX ADMIN — INSULIN LISPRO 1 UNITS: 100 INJECTION, SOLUTION INTRAVENOUS; SUBCUTANEOUS at 00:36

## 2019-03-22 RX ADMIN — OXYBUTYNIN CHLORIDE 2.5 MG: 5 TABLET ORAL at 00:29

## 2019-03-22 RX ADMIN — CEFTRIAXONE SODIUM 1 G: 1 INJECTION, POWDER, FOR SOLUTION INTRAMUSCULAR; INTRAVENOUS at 21:24

## 2019-03-22 RX ADMIN — INSULIN LISPRO 1 UNITS: 100 INJECTION, SOLUTION INTRAVENOUS; SUBCUTANEOUS at 13:35

## 2019-03-22 RX ADMIN — METOPROLOL TARTRATE 50 MG: 50 TABLET ORAL at 21:24

## 2019-03-22 RX ADMIN — ATORVASTATIN CALCIUM 40 MG: 40 TABLET, FILM COATED ORAL at 08:31

## 2019-03-22 RX ADMIN — METOPROLOL TARTRATE 50 MG: 50 TABLET ORAL at 08:31

## 2019-03-22 RX ADMIN — OXYBUTYNIN CHLORIDE 2.5 MG: 5 TABLET ORAL at 21:24

## 2019-03-22 RX ADMIN — INSULIN LISPRO 1 UNITS: 100 INJECTION, SOLUTION INTRAVENOUS; SUBCUTANEOUS at 21:25

## 2019-03-22 RX ADMIN — SODIUM CHLORIDE: 9 INJECTION, SOLUTION INTRAVENOUS at 06:11

## 2019-03-22 RX ADMIN — ENOXAPARIN SODIUM 30 MG: 30 INJECTION SUBCUTANEOUS at 08:31

## 2019-03-22 ASSESSMENT — PAIN SCALES - GENERAL
PAINLEVEL_OUTOF10: 0
PAINLEVEL_OUTOF10: 0

## 2019-03-22 NOTE — PLAN OF CARE
Problem: Nutrition  Goal: Optimal nutrition therapy  Outcome: Ongoing  Note:   Nutrition Problem: Predicted suboptimal energy intake  Intervention: Food and/or Nutrient Delivery: Continue current diet, Start ONS  Nutritional Goals: Pt will have po intakes 50% or greater this admission

## 2019-03-22 NOTE — ED NOTES
Pt Spo2 dropped to 88-89% on RA. Pt placed on 2L NC. Pt Spo2 now 98%.      Alfreda Klein RN  03/21/19 1303

## 2019-03-22 NOTE — CARE COORDINATION
CASE MANAGEMENT INITIAL ASSESSMENT      Reviewed chart and met with patient today, re: potential discharge needs   Explained Case Management role/services. Family present: son   Primary contact information: Camellia Kayser    Admit date/status: 3/21/19 Inpatient   Diagnosis: UTI     Insurance: Medicare  Precert required for SNF -  N        3 night stay required - Y    Living arrangements, Adls, care needs, prior to admission: lives in a condo by herself     Transportation: family    Durable Medical Equipment at home: Walker_X_Cane_X_RTS__ BSC__Shower Chair__  02__ HHN__ CPAP__  BiPap__  Hospital Bed__ W/C___ Other__________    Services in the home and/or outpatient, prior to admission: none    Dialysis Facility (if applicable)   · Name:  · Address:  · Dialysis Schedule:  · Phone:  · Fax:    PT/OT recs: none    Hospital Exemption Notification (HEN): not initiated     Barriers to discharge: none    Plan/comments: Patient lives home alone. Son states he has recently been worrying about her being home alone due to her increasing confusion. Discussed that PT/OT orders are in and will watch for rec's and needs and plan discharge appropriately. He voiced understanding.      ECOC on chart for MD signature

## 2019-03-22 NOTE — H&P
Hospital Medicine History & Physical      PCP: Franklin Jang MD    Date of Admission: 3/21/2019    Date of Service: Pt seen/examined on 3/21/2019 and Admitted to Inpatient with expected LOS greater than two midnights due to medical therapy. Chief Complaint:  confusion      History Of Present Illness:     82 y.o. female who presented to [facility] with above complaints  Patient presented to the ED via EMS for altered mental status. Patient currently confused and unable to provide any history for me. So all the history was obtained from the ER staff. Patient was apparently seen normal 2 days ago, and was last spoken to yesterday night by family. OT who went to her house today, the patient's son and she did not answer the door. The patient's son checked on her at around 5:30 PM today, the patient was apparently confused and he called EMS to come pick her up. EMS reported right sided weakness, but pt did not have focal weakness in ER. It'll status was apparently waxing and waning in the ER too where she was having periods of lucidity. Past Medical History:          Diagnosis Date    DJD (degenerative joint disease)     Eczema     Elevated LFTs     HBP (high blood pressure)     Hyperlipidemia     NIDDM (non-insulin dependent diabetes mellitus)     Scoliosis        Past Surgical History:          Procedure Laterality Date    BLADDER SUSPENSION      CHOLECYSTECTOMY      COLONOSCOPY      COLONOSCOPY  3/30/16    tics    JOINT REPLACEMENT  4/12/11    rigth total hip replacement with cell saver and platelet gel.  TONSILLECTOMY AND ADENOIDECTOMY      UTERINE SUSPENSION         Medications Prior to Admission:      Prior to Admission medications    Medication Sig Start Date End Date Taking?  Authorizing Provider   oxybutynin (DITROPAN) 5 MG tablet TAKE ONE TABLET BY MOUTH Two TIMES A DAY 3/19/19   Franklin Jang MD   Lift Chair MISC by Does not apply route Power left chair 3/19/19 Cory Ku MD   metFORMIN (GLUCOPHAGE-XR) 500 MG extended release tablet TAKE ONE TABLET BY MOUTH DAILY WITH BREAKFAST 3/17/19   Dayday Poet Mora, MD   metoprolol tartrate (LOPRESSOR) 50 MG tablet Take 1 tablet by mouth 2 times daily 3/7/19   Nelia Huitron MD   atorvastatin (LIPITOR) 40 MG tablet TAKE ONE TABLET BY MOUTH DAILY 11/11/18   Dayday Poet Day, MD   melatonin 3 MG TABS tablet Take 1 tablet by mouth nightly as needed (sleep) 8/17/18 9/16/18  MD TERENCE AlemanOGEJACKIE LANCETS ULTRATHIN 30G MISC USE ONE LANCET TO TEST BLOOD SUGAR LEVELS ONCE DAILY, Dx: E11.9 8/1/18   Cory Ku MD   MICROLET LANCETS [address] 1 each by Does not apply route daily Dx E11.92 6/26/17   Cory Ku MD   Glucos-Chond-Hyal Ac-Ca Fructo (MOVE FREE JOINT HEALTH ADVANCE PO) Take by mouth    Historical Provider, MD   Coenzyme Q10 (CO Q 10 PO) Take 1 tablet by mouth daily    Historical Provider, MD PENA LANCETS ULTRATHIN 30G MISC USE TO TEST BLOOD SUGAR LEVELS ONCE DAILY  Dx E11.9 2/26/16   Magalis Mcelroy MD       Allergies:  Metformin and related; Darvocet [propoxyphene n-acetaminophen]; and Propoxyphene    Social History:      The patient currently lives at home    TOBACCO:   reports that she has never smoked. She has never used smokeless tobacco.  ETOH:   reports that she drinks alcohol. Family History:  Positive as follows:        Problem Relation Age of Onset    Cancer Mother     Heart Disease Father     Cancer Sister        REVIEW OF SYSTEMS:   Pertinent positives as noted in the HPI. All other systems reviewed and negative. PHYSICAL EXAM PERFORMED:    BP (!) 177/75   Pulse 74   Temp 99.4 °F (37.4 °C) (Oral)   Resp 16   Ht 5' 5\" (1.651 m)   Wt 142 lb (64.4 kg)   SpO2 91%   BMI 23.63 kg/m²     General appearance:  No apparent distress, appears stated age and cooperative. HEENT:  Normal cephalic, atraumatic without obvious deformity. Pupils equal, round, and reactive to light.   Extra ocular muscles intact. Conjunctivae/corneas clear. Neck: Supple, with full range of motion. No jugular venous distention. Trachea midline. Respiratory:  Normal respiratory effort. Clear to auscultation, bilaterally without Rales/Wheezes/Rhonchi. Cardiovascular:  Regular rate and rhythm with normal S1/S2 without murmurs, rubs or gallops. Abdomen: Soft, non-tender, non-distended with normal bowel sounds. Musculoskeletal:  No clubbing, cyanosis or edema bilaterally. Full range of motion without deformity. Skin: Skin color, texture, turgor normal.  No rashes or lesions. Neurologic:    Alert, oriented x1  Neurovascularly intact without any focal sensory/motor deficits. Cranial nerves: II-XII intact, grossly non-focal.  Psychiatric:  Alert and oriented, thought content appropriate, normal insight  Capillary Refill: Brisk,< 3 seconds   Peripheral Pulses: +2 palpable, equal bilaterally       Labs:     Recent Labs     03/21/19 1954   WBC 7.8   HGB 12.6   HCT 38.4        Recent Labs     03/21/19 1954      K 4.7   CL 97*   CO2 28   BUN 30*   CREATININE 1.2   CALCIUM 9.4     Recent Labs     03/21/19 1954   AST 28   ALT 24   BILITOT 0.8   ALKPHOS 101     Recent Labs     03/21/19 1954   INR 1.01     No results for input(s): Rc Daily in the last 72 hours. Urinalysis:      Lab Results   Component Value Date    NITRU POSITIVE 03/21/2019    WBCUA >100 03/21/2019    BACTERIA 4+ 03/21/2019    RBCUA see below 03/21/2019    BLOODU SMALL 03/21/2019    SPECGRAV 1.015 03/21/2019    GLUCOSEU Negative 03/21/2019    GLUCOSEU NEGATIVE 04/01/2011       Radiology:     CXR: I have reviewed the CXR with the following interpretation: no acute process  CTh - negative    CT HEAD WO CONTRAST   Final Result   No acute intracranial abnormality. XR CHEST PORTABLE   Final Result   No acute abnormality. Overall clear appearing lungs. ASSESSMENT:PLAN:    Acute metabolic encephalopathy - likely due to UTI. CTh was negative. Waxing an waning mental status likely due to infection from UTI. Treat underlying cause with antibiotics, and reassess mental status on a daily basis. If no improvement in 24-48 hrs can consider MRI to r/o CVA, low suspicion for CVA currently    UTI (urinary tract infection) - iv rocephin started, f/u cultures and tailor antiBx accordingly    Essential hypertension - uncontrolled on arrival, improved thereafter, resume home medications and monitor BP    Type 2 diabetes mellitus - last A1C 8.4, held metformin, started on SSI with Accu-Cheks    Mixed hyperlipidemia - resume statin    DVT Prophylaxis: lmwh  Diet: carb control  Code Status: fc  PT/OT Eval Status: na    Dispo - IP stay       Reanna Barajas MD    Thank you Diamond Marie MD for the opportunity to be involved in this patient's care. If you have any questions or concerns please feel free to contact me at [phone].

## 2019-03-22 NOTE — PROGRESS NOTES
Assessment complete. VSS. Ambulated pt to BR with walker, set up to breakfast tray. Call light in reach.

## 2019-03-22 NOTE — PROGRESS NOTES
kg/m²     General appearance:  No apparent distress, appears stated age and cooperative. HEENT:  Normal cephalic, atraumatic without obvious deformity. Pupils equal, round, and reactive to light. Extra ocular muscles intact. Conjunctivae/corneas clear. Neck: Supple, with full range of motion. No jugular venous distention. Trachea midline. Respiratory:  Normal respiratory effort. Clear to auscultation, bilaterally without Rales/Wheezes/Rhonchi. Cardiovascular:  Regular rate and rhythm with normal S1/S2 without murmurs, rubs or gallops. Abdomen: Soft, non-tender, non-distended with normal bowel sounds. Musculoskeletal:  No clubbing, cyanosis or edema bilaterally. Full range of motion without deformity. Skin: Skin color, texture, turgor normal.  No rashes or lesions. Neurologic:    Alert, oriented x1  Neurovascularly intact without any focal sensory/motor deficits. Cranial nerves: II-XII intact, grossly non-focal.  Psychiatric:  Alert and oriented, thought content appropriate, normal insight  Capillary Refill: Brisk,< 3 seconds   Peripheral Pulses: +2 palpable, equal bilaterally     Labs:   Recent Labs     03/21/19 1954   WBC 7.8   HGB 12.6   HCT 38.4        Recent Labs     03/21/19 1954 03/22/19  0736    141   K 4.7 4.4   CL 97* 103   CO2 28 27   BUN 30* 23*   CREATININE 1.2 1.1   CALCIUM 9.4 8.9     Recent Labs     03/21/19 1954   AST 28   ALT 24   BILITOT 0.8   ALKPHOS 101     Recent Labs     03/21/19 1954   INR 1.01     No results for input(s): Regan Lujan in the last 72 hours. Urinalysis:      Lab Results   Component Value Date    NITRU POSITIVE 03/21/2019    WBCUA >100 03/21/2019    BACTERIA 4+ 03/21/2019    RBCUA see below 03/21/2019    BLOODU SMALL 03/21/2019    SPECGRAV 1.015 03/21/2019    GLUCOSEU Negative 03/21/2019    GLUCOSEU NEGATIVE 04/01/2011       Radiology:  CT HEAD WO CONTRAST   Final Result   No acute intracranial abnormality.          XR CHEST PORTABLE   Final Result   No acute abnormality. Overall clear appearing lungs. Assessment/Plan:    Active Hospital Problems    Diagnosis Date Noted    Acute encephalopathy [G93.40] 03/21/2019    Essential hypertension [I10] 01/27/2016    Type 2 diabetes mellitus without complication ([facility] [address] [M11.0] 01/27/2016    Mixed hyperlipidemia [E78.2] 01/27/2016    UTI (urinary tract infection) [N39.0] 05/02/2014     Acute metabolic encephalopathy - likely due to UTI. CT head was negative. Waxing an waning mental status likely due to infection from UTI. Treat underlying cause with antibiotics, and reassess mental status on a daily basis. If no improvement in 24-48 hrs can consider MRI to r/o CVA, low suspicion for CVA currently.  Per family, may have component of mild cognitive deficit at baseline but has not been diagnosed.     UTI (urinary tract infection) - iv rocephin started, f/u cultures and tailor antiBx accordingly     Essential hypertension - uncontrolled on arrival, improved thereafter, resume home medications and monitor BP     Type 2 diabetes mellitus - last A1C 8.4, held metformin, started on SSI with Accu-Cheks     Mixed hyperlipidemia - resume statin    DVT Prophylaxis: lovenox  Diet: DIET CARB CONTROL;  Code Status: Full Code    PT/OT Eval Status: ordered    Dispo - 1-2 days pending mental status improvement    Kay Santana MD

## 2019-03-22 NOTE — ED PROVIDER NOTES
use: No    Sexual activity: Never   Lifestyle    Physical activity:     Days per week: Not on file     Minutes per session: Not on file    Stress: Not on file   Relationships    Social connections:     Talks on phone: Not on file     Gets together: Not on file     Attends Mosque service: Not on file     Active member of club or organization: Not on file     Attends meetings of clubs or organizations: Not on file     Relationship status: Not on file    Intimate partner violence:     Fear of current or ex partner: Not on file     Emotionally abused: Not on file     Physically abused: Not on file     Forced sexual activity: Not on file   Other Topics Concern    Not on file   Social History Narrative    Not on file       Nursing Notes Reviewed      ROS:  GENERAL:  No fever, no chills, no diaphoresis, no appetite changes  EYES: no eye discharge, no eye redness, no visual changes  ENT: no nasal congestion, no sore throat  CARDIAC: no chest pain,no leg swelling  PULM: no cough, no shortness of breath  ABD: no abdominal pain, no nausea, no vomiting, no diarrhea  : no dysuria, no hematuria, no urgency, + frequency. No flank pain  MUSCULOSKELETAL: no back pain, no arthralgias, no myalgias  NEURO: no headache, no lightheadedness, no dizziness, no numbness, +weakness, no syncope, + confusion, no speech difficulty  SKIN: no rashes, no erythema, no wounds, no ecchymosis      PHYSICAL EXAM:  GENERAL APPEARANCE: Ammon Boast is in no acute respiratory distress. Awake and alert. VITAL SIGNS:   ED Triage Vitals   Enc Vitals Group      BP 03/21/19 1934 (!) 213/79      Pulse 03/21/19 1933 72      Resp 03/21/19 1933 16      Temp 03/21/19 1933 99.4 °F (37.4 °C)      Temp Source 03/21/19 1933 Oral      SpO2 03/21/19 1933 95 %      Weight 03/21/19 1931 142 lb (64.4 kg)      Height 03/21/19 1931 5' 5\" (1.651 m)      Head Circumference --       Peak Flow --       Pain Score --       Pain Loc --       Pain Edu? --       Excl. in GC? --      HEAD: Normocephalic, atraumatic. EYES:  Extraocular muscles are intact. Pupils equal round and reactive to light. Conjunctivas are pink. Negative scleral icterus. ENT:  Mucous membranes are moist.  Pharynx without erythema or exudates. NECK: Nontender and supple. No cervical adenopathy. CHEST:  Clear to auscultation bilaterally. No rales, rhonchi, or wheezing. HEART:  Regular rate and regular rhythm. No murmurs. Strong and equal pulses in the upper and lower extremities. ABDOMEN: Soft,  nondistended, positive bowel sounds. abdomen is nontender. No rebound. no guarding. MUSCULOSKELETAL: The calves are nontender to palpation. Active range of motion of the upper and lower extremities. No edema. NEUROLOGICAL: Awake, alert and oriented x 1. Confused. Power intact in the upper and lower extremities. Sensation is intact to light touch in the upper and lower extremities. Cranial Nerves 2-12 are intact. Seems aphasic. Unable to perform  finger to nose due to lack of comprehension. NIH Stroke Scale   Person Administering Scale: ASHWIN LUND   Administer stroke scale items in the order listed. Record performance in each category after each subscale exam. Do not go back and change scores. Follow directions provided for each exam technique. Scores should reflect what the patient does, not what the clinician thinks the patient can do. The clinician should record answers while administering the exam and work quickly. Except where indicated, the patient should not be coached (i.e., repeated requests to patient to make a special effort). 1a  Level of consciousness: 0=alert; keenly responsive   1b. LOC questions:  1=Performs one task correctly   1c. LOC commands: 0=Performs both tasks correctly   2. Best Gaze: 0=normal   3. Visual: 0=No visual loss   4. Facial Palsy: 0=Normal symmetric movement   5a. Motor left arm: 0=No drift, limb holds 90 (or 45) degrees for full 10 seconds   5b.   Motor right arm: 0=No drift, limb holds 90 (or 45) degrees for full 10 seconds   6a. motor left le=No drift, limb holds 90 (or 45) degrees for full 10 seconds   6b  Motor right le=No drift, limb holds 90 (or 45) degrees for full 10 seconds   7. Limb Ataxia: 0=Absent   8. Sensory: 0=Normal; no sensory loss   9. Best Language:  1=Mild to moderate aphasia; some obvious loss of fluency or facility of comprehension without significant limitation on ideas expressed or form of expression. 10. Dysarthria: 0=Normal   11. Extinction and Inattention: 0=No abnormality         Total:  2       DERMATOLOGIC: No petechiae, rashes, or ecchymoses. No erythema. PSYCH: normal mood and affect. Normal thought content. ED COURSE AND MEDICAL DECISION MAKING:    EKG as interpreted by myself:  normal sinus rhythm with a rate of 69  Axis is   Normal  QTc is  normal  Intervals and Durations are unremarkable. No specific ST-T wave changes appreciated. No evidence of acute ischemia. Radiology:  Films have been read by radiologist as noted in chart unless otherwise stated. Other radiologic studies (i.e. CT, MRI, ultrasounds, etc ) have been interpreted by radiologist.     [address]   Final Result   No acute intracranial abnormality. XR CHEST PORTABLE   Final Result   No acute abnormality. Overall clear appearing lungs.              Labs:  Results for orders placed or performed during the hospital encounter of 19   CBC auto differential   Result Value Ref Range    WBC 7.8 4.0 - 11.0 K/uL    RBC 4.55 4.00 - 5.20 M/uL    Hemoglobin 12.6 12.0 - 16.0 g/dL    Hematocrit 38.4 36.0 - 48.0 %    MCV 84.3 80.0 - 100.0 fL    MCH 27.8 26.0 - 34.0 pg    MCHC 32.9 31.0 - 36.0 g/dL    RDW 14.3 12.4 - 15.4 %    Platelets 991 828 - 169 K/uL    MPV 10.1 5.0 - 10.5 fL    Neutrophils % 69.9 %    Lymphocytes % 15.9 %    Monocytes % 9.2 %    Eosinophils % 4.3 %    Basophils % 0.7 %    Neutrophils # 5.5 1.7 - 7.7 K/uL Lymphocytes # 1.2 1.0 - 5.1 K/uL    Monocytes # 0.7 0.0 - 1.3 K/uL    Eosinophils # 0.3 0.0 - 0.6 K/uL    Basophils # 0.1 0.0 - 0.2 K/uL   Comprehensive metabolic panel   Result Value Ref Range    Sodium 136 136 - 145 mmol/L    Potassium 4.7 3.5 - 5.1 mmol/L    Chloride 97 (L) 99 - 110 mmol/L    CO2 28 21 - 32 mmol/L    Anion Gap 11 3 - 16    Glucose 208 (H) 70 - 99 mg/dL    BUN 30 (H) 7 - 20 mg/dL    CREATININE 1.2 0.6 - 1.2 mg/dL    GFR Non- 43 (A) >60    GFR  52 (A) >60    Calcium 9.4 8.3 - 10.6 mg/dL    Total Protein 8.0 6.4 - 8.2 g/dL    Alb 4.1 3.4 - 5.0 g/dL    Albumin/Globulin Ratio 1.1 1.1 - 2.2    Total Bilirubin 0.8 0.0 - 1.0 mg/dL    Alkaline Phosphatase 101 40 - 129 U/L    ALT 24 10 - 40 U/L    AST 28 15 - 37 U/L    Globulin 3.9 g/dL   Urinalysis Reflex to Culture   Result Value Ref Range    Color, UA Straw Straw/Yellow    Clarity, UA CLOUDY (A) Clear    Glucose, Ur Negative Negative mg/dL    Bilirubin Urine Negative Negative    Ketones, Urine Negative Negative mg/dL    Specific Gravity, UA 1.015 1.005 - 1.030    Blood, Urine SMALL (A) Negative    pH, UA 6.5 5.0 - 8.0    Protein,  (A) Negative mg/dL    Urobilinogen, Urine 0.2 <2.0 E.U./dL    Nitrite, Urine POSITIVE (A) Negative    Leukocyte Esterase, Urine LARGE (A) Negative    Microscopic Examination YES     Urine Reflex to Culture Yes     Urine Type Not Specified    Lactic Acid, Plasma   Result Value Ref Range    Lactic Acid 0.9 0.4 - 2.0 mmol/L   Protime-INR   Result Value Ref Range    Protime 11.5 9.8 - 13.0 sec    INR 1.01 0.86 - 1.14   Microscopic Urinalysis   Result Value Ref Range    WBC, UA >100 (A) 0 - 5 /HPF    RBC, UA see below 0 - 2 /HPF    Epi Cells 5-10 /HPF    Bacteria, UA 4+ (A) /HPF   EKG 12 Lead   Result Value Ref Range    Ventricular Rate 69 BPM    Atrial Rate 69 BPM    P-R Interval 148 ms    QRS Duration 74 ms    Q-T Interval 422 ms    QTc Calculation (Bazett) 452 ms    P Axis 55 degrees    R Axis -7 degrees    T Axis 76 degrees    Diagnosis       Normal sinus rhythmNormal ECGWhen compared with ECG of 25-FEB-2019 17:03,Vent. rate has decreased BY  51 BPMNonspecific T wave abnormality no longer evident in Inferior leadsNonspecific T wave abnormality, improved in Lateral leads       Treatment in the department:  Patient received the following while in the ED. Medications   0.9 % sodium chloride bolus (has no administration in time range)   cefTRIAXone (ROCEPHIN) 1 g IVPB in 50 mL D5W minibag (has no administration in time range)   ondansetron (ZOFRAN) injection 4 mg (4 mg Intravenous Given 3/21/19 2104)         Repeat exam at 9:11 PM   shows patient now answering questions appropriately. Can perform finger ton nose bilaterally. Naming items appropriately. Asking for something to drink. Medical decision making:  Patient presents AMS. On arrival seemed to have some aphasia although following some commands but not others and answering some questions, no others. Unclear if due to encephalopathy vs true aphasia. Aphasia symptoms seemed to clear considerably in ED. No focal weakness noted and no time of onset known other than last seen 48 hours ago and talked to 24 hours ago. Not candidate for tpa. CT head negative. Has UTI but no lactic acidosis. Cultures sent. Started on antibiotics. Symptoms may be due to metabolic encephalopathy from infection. If symptoms worsen or recur could consider further stroke/TIA workup. I spoke with Dr. Zoe Murphy. We thoroughly discussed the history, physical exam, laboratory and imaging studies, as well as, emergency department course. Based upon that discussion, we've decided to admit Stefania Tejada for further observation and evaluation of Bhaskar Go's encephalopathy and UTI.   As I have deemed necessary from their history, physical, and studies, I have considered and evaluated Stefania Tejada for the following diagnoses: CVA, ICH, encephalopathy, pneumonia, uti, sepsis, electrolyte disturbance           Clinical Impression:  1. Acute encephalopathy    2. Urinary tract infection without hematuria, site unspecified    3. Aphasia        Dispo:  Patient will be admitted at this time. Patient was informed of this decision and agrees with plan. I have discussed lab and xray findings with patient and they understand. Questions were answered to the best of my ability. Discharge vitals:  Blood pressure (!) 177/75, pulse 74, temperature 99.4 °F (37.4 °C), temperature source Oral, resp. rate 16, height 5' 5\" (1.651 m), weight 142 lb (64.4 kg), SpO2 91 %. Prescriptions given:   New Prescriptions    No medications on file         This chart was created using Dragon voice recognition software.         Noemy Zelaya MD  03/22/19 1853

## 2019-03-22 NOTE — ED NOTES
Sudden onset of nausea. Pt vomiting byle. Verbal order for zofran obtained from Dr. Oral Cross.      Rxo Wilson, RN  03/21/19 9650

## 2019-03-22 NOTE — PROGRESS NOTES
Nutrition Assessment    Type and Reason for Visit: Initial, Positive Nutrition Screen(MST 2. Wounds. )    Nutrition Recommendations:   · Continue carb control diet  · Add Magic Cup BID  · Please obtain actual weight if possible  · Monitor po intakes, nutrition adequacy, weights, pertinent labs, BMs    Nutrition Assessment: Pt at risk for malnutrition on admission AEB muscle loss and pt report of weight loss. LAUREEN amount of weight loss d/t lack of actual CBW (weight ordered 3/22). Pt at risk for further compromise 2/2 increased needs from presence of wound. Encouraged protein intakes. Pt favorable to Taina, will add to order and continue carb control diet. Malnutrition Assessment:  · Malnutrition Status: At risk for malnutrition  · Context: Acute illness or injury  · Findings of the 6 clinical characteristics of malnutrition (Minimum of 2 out of 6 clinical characteristics is required to make the diagnosis of moderate or severe Protein Calorie Malnutrition based on AND/ASPEN Guidelines):  1. Energy Intake-Unable to assess(Pt endorses okay appetite),      2. Weight Loss-Unable to assess(LAUREEN d/t stated CBW),    3. Fat Loss-No significant subcutaneous fat loss,    4. Muscle Loss-Mild muscle mass loss, Temples (temporalis muscle)  5. Fluid Accumulation-No significant fluid accumulation,    6.  Strength-Not measured    Nutrition Risk Level: Moderate    Nutrient Needs:  · Estimated Daily Total Kcal: 7262-9597(30-43 kcal/kg)  · Estimated Daily Protein (g): 77-90(1.2-1.4 g/kg)  · Estimated Daily Total Fluid (ml/day): 1 ml/kcal    Nutrition Diagnosis:   · Problem: Predicted suboptimal energy intake  · Etiology: related to Insufficient energy/nutrient consumption     Signs and symptoms:  as evidenced by Patient report of, Weight loss    Objective Information:  · Nutrition-Focused Physical Findings: No edema. Some confusion.   · Wound Type: Pressure Ulcer, Stage I  · Current Nutrition Therapies:  · Oral Diet Orders: Carb Control 4 Carbs/Meal   · Oral Diet intake: Unable to assess  · Oral Nutrition Supplement (ONS) Orders: None  · ONS intake: Unable to assess  · Anthropometric Measures:  · Ht: 5' 5\" (165.1 cm)   · Current Body Wt: 142 lb (64.4 kg)(stated)  · Admission Body Wt: 142 lb (64.4 kg)(stated)  · % Weight Change:  ,  LAUREEN d/t stated CBW, pt endorses losing \"a few lbs\", but unable to specify amount.  Weight ordered 3/22  · Ideal Body Wt: 125 lb (56.7 kg)   · BMI Classification: BMI 18.5 - 24.9 Normal Weight    Nutrition Interventions:   Continue current diet, Start ONS  Continued Inpatient Monitoring    Nutrition Evaluation:   · Evaluation: Goals set   · Goals: Pt will have po intakes 50% or greater this admission    · Monitoring: Meal Intake, Supplement Intake, Weight, Pertinent Labs      Electronically signed by Vince Chirinos RD, LD on 3/22/19 at 12:05 PM    Contact Number: Office: [phone]; [address]: [id number]

## 2019-03-22 NOTE — PROGRESS NOTES
Physical Therapy    Facility/Department: [facility] B3 - MED SURG  Initial Assessment    NAME: Kvng Khan  : 1935  MRN: [id number]    Date of Service: 3/22/2019    Discharge Recommendations:  Continue to assess pending progress, ECF with PT, Patient would benefit from continued therapy after discharge   PT Equipment Recommendations  Equipment Needed: No(defer to next level of care)    Assessment   Body structures, Functions, Activity limitations: Decreased functional mobility ; Decreased strength;Decreased endurance;Decreased coordination;Decreased safe awareness;Decreased cognition;Decreased balance  Assessment: Pt is 81 y/o female referred to PT with diagnosis of UTI. Pt and pt's son reporting PLOF to be IND with RW since last hospitalization a \"couple weeks ago\", but pt's son adamant about pt becoming more forgetful and his concern for pt's safety at home alone. Pt's son reports no family/friends able to provide 24 hr assist. Today, pt amb 175 ft Min A with RW. Pt demonstrating poor safety awareness throughout with tendency to amb with feet outside of PRETTY of RW especially during turns. Pt also required max cues for hand placement for safety with STS transistions and was unable to locate room following amb with PT directing pt back to room and informing pt of room number. D/t dec cognition, poor safety awareness, and lack of 24 hr assist available at home, pt is unsafe to return home at d/c. Pt would benefit from continued acute PT services until d/c from hospital. Will continue to assess pt progress and safety with functional mobility to determing d/c recs, but likely ECF with PT at this time. Pt's son aware and agreeable to this recommendation.    Treatment Diagnosis: dec safety awareness  Specific instructions for Next Treatment: progress functional mobility per pt yary  Prognosis: Good;Fair  Decision Making: High Complexity  Patient Education: role of PT, use of call light, amb/transfers with RW; pt verbalizes understanding  Barriers to Learning: dec cog  REQUIRES PT FOLLOW UP: Yes  Activity Tolerance  Activity Tolerance: Patient Tolerated treatment well       Patient Diagnosis(es): The primary encounter diagnosis was Acute encephalopathy. Diagnoses of Urinary tract infection without hematuria, site unspecified and Aphasia were also pertinent to this visit. has a past medical history of DJD (degenerative joint disease), Eczema, Elevated LFTs, HBP (high blood pressure), Hyperlipidemia, NIDDM (non-insulin dependent diabetes mellitus), and Scoliosis. has a past surgical history that includes Cholecystectomy; bladder suspension; Uterine Suspension; joint replacement (4/12/11); Tonsillectomy and adenoidectomy; Colonoscopy; and Colonoscopy (3/30/16).     Restrictions  Restrictions/Precautions  Restrictions/Precautions: Fall Risk, General Precautions, Up as Tolerated  Position Activity Restriction  Other position/activity restrictions: high fall, IV  Vision/Hearing  Vision: Impaired  Vision Exceptions: Wears glasses at all times     Subjective  General  Chart Reviewed: Yes  Patient assessed for rehabilitation services?: Yes  Response To Previous Treatment: Not applicable  Family / Caregiver Present: Yes  Referring Practitioner: Dr. Amol Alatorre  Referral Date : 03/22/19  Diagnosis: UTI  General Comment  Comments: RN cleared pt for therapy  Subjective  Subjective: Pt sitting up in chair with son present and agreeable to PT this PM.   Pain Screening  Patient Currently in Pain: Denies  Vital Signs  Patient Currently in Pain: Denies       Orientation  Orientation  Overall Orientation Status: Within Normal Limits  Social/Functional History  Social/Functional History  Lives With: Alone  Type of Home: (condo)  Home Layout: Multi-level, Performs ADL's on one level, Able to Live on Main level with bedroom/bathroom  Home Access: Stairs to enter with rails  Entrance Stairs - Number of Steps: 2 ALBA, 12 steps down to basement  Bathroom Shower/Tub: Tub/Shower unit  Bathroom Toilet: Handicap height  Bathroom Equipment: Shower chair(refuses to use it per son)  Home Equipment: Rolling walker, Cane, Alert Button  ADL Assistance: Independent  Homemaking Assistance: Independent  Homemaking Responsibilities: Yes  Ambulation Assistance: Independent  Transfer Assistance: Independent  Active : Yes(not supposed to per son)  Occupation: Retired  Leisure & Hobbies: watching tv  Additional Comments: home nursing and PT 3x/wk per pt and son, denies falls and denies N/T in [facility] 139        Objective     Observation/Palpation  Posture: Fair    AROM RLE (degrees)  RLE AROM: WNL  AROM LLE (degrees)  LLE AROM : WNL  Strength RLE  Strength RLE: WNL  Strength LLE  Strength LLE: WNL     Sensation  Overall Sensation Status: (denies N/T in BLEs)  Bed mobility  Bridging: Unable to assess  Rolling to Left: Unable to assess  Rolling to Right: Unable to assess  Supine to Sit: Unable to assess  Sit to Supine: Unable to assess  Scooting: Modified independent(in chair)  Transfers  Sit to Stand: Stand by assistance  Stand to sit: Stand by assistance  Bed to Chair: Unable to assess  Ambulation  Ambulation?: Yes  Ambulation 1  Surface: level tile  Device: Rolling Walker  Other Apparatus:  (IV)  Assistance: Minimal assistance  Quality of Gait: dec rosey with variable step lengths and tendency to amb outside of PRETTY of RW especially with turns, easily distracted throughout with poor safety awareness and inability to locate room after amb  Distance: 175'              Plan   Plan  Times per week: 3-5x/wk  Plan weeks: until d/c  Specific instructions for Next Treatment: progress functional mobility per pt yary  Current Treatment Recommendations: Strengthening, Transfer Training, Endurance Training, Neuromuscular Re-education, ROM, Balance Training, Gait Training, Functional Mobility Training, Stair training, Safety Education & Training, Home Exercise

## 2019-03-22 NOTE — DISCHARGE INSTR - COC
Continuity of Care Form    Patient Name: Jenny Alejandro   :  1935  MRN:  [id number]    Admit date:  3/21/2019  Discharge date:  3/27/19    Code Status Order: Full Code   Advance Directives:   [address] [facility] Documentation     Date/Time Healthcare Directive Type of Healthcare Directive Copy in [address] Agent's Name Healthcare Agent's Phone Number    [phone]  Yes, patient has an advance directive for healthcare treatment  --  --  --  --  --          Admitting Physician:  Cleveland Sibley MD  PCP: Wali Mcbride MD    Discharging Nurse: [facility] Unit/Room#: 6881/9735-20  Discharging Unit Phone Number: [phone]    Emergency Contact:   Extended Emergency Contact Information  Primary Emergency Contact: Esme Steinberg \"Nitesh\"   [address] Phone: [phone]  Relation: Child  Secondary Emergency Contact: [place] [address] Phone: [phone]  Relation: Child    Past Surgical History:  Past Surgical History:   Procedure Laterality Date    BLADDER SUSPENSION      CHOLECYSTECTOMY      COLONOSCOPY      COLONOSCOPY  3/30/16    tics    JOINT REPLACEMENT  11    rigth total hip replacement with cell saver and platelet gel.     TONSILLECTOMY AND ADENOIDECTOMY      UTERINE SUSPENSION         Immunization History:   Immunization History   Administered Date(s) Administered    Influenza Virus Vaccine 2009, 10/27/2010, 2011, 2012, 10/02/2013    Influenza, High Dose (Fluzone 65 yrs and older) 10/24/2014, 10/18/2016, 10/09/2017, 2018    Pneumococcal 13-valent Conjugate (Bxzjavs22) 2016    Pneumococcal Polysaccharide ([id number]) 2003, 2008    Td, unspecified formulation 2006       Active Problems:  Patient Active Problem List   Diagnosis Code    Status post hip replacement Z96.649    Osteoarthritis M19.90    Scoliosis M41.9    Elevated LFTs R94.5    Ganglion cyst M67.40    UTI (urinary tract infection) N39.0    Essential hypertension I10    Type 2 diabetes mellitus without complication (HCC) N89.0    Mixed hyperlipidemia E78.2    Primary osteoarthritis of left hip M16.12    DDD (degenerative disc disease), lumbar M51.36    History of total right hip replacement Z96.641    Subclinical hypothyroidism E03.9    Moderate malnutrition (HCC) E44.0    Acute encephalopathy G93.40       Isolation/Infection:   Isolation          No Isolation            Nurse Assessment:  Last Vital Signs: BP (!) 177/65   Pulse 70   Temp 98.5 °F (36.9 °C) (Oral)   Resp 16   Ht 5' 5\" (1.651 m)   Wt 135 lb 1.6 oz (61.3 kg)   SpO2 93%   BMI 22.48 kg/m²     Last documented pain score (0-10 scale): Pain Level: 0  Last Weight:   Wt Readings from Last 1 Encounters:   03/22/19 135 lb 1.6 oz (61.3 kg)     Mental Status:  disoriented and alert    IV Access:  - None    Nursing Mobility/ADLs:  Walking   Assisted  Transfer  Assisted  Bathing  Assisted  Dressing  Assisted  Toileting  Assisted  Feeding  Independent  Med Admin  Assisted  Med Delivery   whole    Wound Care Documentation and Therapy:  Wound 03/22/19 Buttocks Inner non blanchable (Active)   Wound Pressure Stage  1 3/21/2019 11:45 PM   Wound Assessment Clean; Intact 3/21/2019 11:45 PM   Drainage Amount None 3/21/2019 11:45 PM   Marycarmen-wound Assessment Dry;Clean 3/21/2019 11:45 PM   Number of days: 0        Elimination:  Continence:   · Bowel: Yes  · Bladder: incontinence at times  Urinary Catheter: None   Colostomy/Ileostomy/Ileal Conduit: No       Date of Last BM: 3.26.19    Intake/Output Summary (Last 24 hours) at 3/22/2019 1449  Last data filed at 3/22/2019 1334  Gross per 24 hour   Intake 1129 ml   Output 200 ml   Net 929 ml     I/O last 3 completed shifts:   In: 589 [I.V.:589]  Out: 200 [Urine:200]    Safety Concerns:     Sundowners Sundrome and History of Falls (last 30 days)    Impairments/Disabilities: is necessary for the continuing treatment of the diagnosis listed and that she requires [place] for less 30 days.      Update Admission H&P: No change in H&P    PHYSICIAN SIGNATURE:  Electronically signed by REI Cook CNP on 3/27/19 at 11:00 AM

## 2019-03-23 LAB
GLUCOSE BLD-MCNC: 170 MG/DL (ref 70–99)
GLUCOSE BLD-MCNC: 194 MG/DL (ref 70–99)
GLUCOSE BLD-MCNC: 222 MG/DL (ref 70–99)
GLUCOSE BLD-MCNC: 254 MG/DL (ref 70–99)
PERFORMED ON: ABNORMAL

## 2019-03-23 PROCEDURE — 97535 SELF CARE MNGMENT TRAINING: CPT

## 2019-03-23 PROCEDURE — 6370000000 HC RX 637 (ALT 250 FOR IP): Performed by: INTERNAL MEDICINE

## 2019-03-23 PROCEDURE — 1200000000 HC SEMI PRIVATE

## 2019-03-23 PROCEDURE — 2580000003 HC RX 258: Performed by: INTERNAL MEDICINE

## 2019-03-23 PROCEDURE — 97166 OT EVAL MOD COMPLEX 45 MIN: CPT

## 2019-03-23 PROCEDURE — 6360000002 HC RX W HCPCS: Performed by: INTERNAL MEDICINE

## 2019-03-23 RX ORDER — AMLODIPINE BESYLATE 5 MG/1
5 TABLET ORAL DAILY
Status: DISCONTINUED | OUTPATIENT
Start: 2019-03-23 | End: 2019-03-24

## 2019-03-23 RX ADMIN — INSULIN LISPRO 3 UNITS: 100 INJECTION, SOLUTION INTRAVENOUS; SUBCUTANEOUS at 12:26

## 2019-03-23 RX ADMIN — METOPROLOL TARTRATE 50 MG: 50 TABLET ORAL at 20:44

## 2019-03-23 RX ADMIN — INSULIN LISPRO 1 UNITS: 100 INJECTION, SOLUTION INTRAVENOUS; SUBCUTANEOUS at 20:43

## 2019-03-23 RX ADMIN — OXYBUTYNIN CHLORIDE 2.5 MG: 5 TABLET ORAL at 09:47

## 2019-03-23 RX ADMIN — CEFTRIAXONE SODIUM 1 G: 1 INJECTION, POWDER, FOR SOLUTION INTRAMUSCULAR; INTRAVENOUS at 20:44

## 2019-03-23 RX ADMIN — ENOXAPARIN SODIUM 30 MG: 30 INJECTION SUBCUTANEOUS at 09:49

## 2019-03-23 RX ADMIN — ATORVASTATIN CALCIUM 40 MG: 40 TABLET, FILM COATED ORAL at 09:46

## 2019-03-23 RX ADMIN — METOPROLOL TARTRATE 50 MG: 50 TABLET ORAL at 09:49

## 2019-03-23 RX ADMIN — INSULIN LISPRO 1 UNITS: 100 INJECTION, SOLUTION INTRAVENOUS; SUBCUTANEOUS at 09:54

## 2019-03-23 RX ADMIN — SODIUM CHLORIDE: 9 INJECTION, SOLUTION INTRAVENOUS at 09:31

## 2019-03-23 RX ADMIN — INSULIN LISPRO 2 UNITS: 100 INJECTION, SOLUTION INTRAVENOUS; SUBCUTANEOUS at 17:56

## 2019-03-23 RX ADMIN — AMLODIPINE BESYLATE 5 MG: 5 TABLET ORAL at 09:49

## 2019-03-23 RX ADMIN — OXYBUTYNIN CHLORIDE 2.5 MG: 5 TABLET ORAL at 20:44

## 2019-03-23 ASSESSMENT — PAIN SCALES - GENERAL
PAINLEVEL_OUTOF10: 0

## 2019-03-23 NOTE — PROGRESS NOTES
General Precautions, Fall Risk  Position Activity Restriction  Other position/activity restrictions: up as tolerated    Subjective   General  Chart Reviewed: Yes  Patient assessed for rehabilitation services?: Yes  Family / Caregiver Present: No  Referring Practitioner: Rito Dimas  Diagnosis: confusion, acute encephalopathy  Subjective  Subjective: Pt in room in bed. Agreeable to OT. General Comment  Comments: RN cleared pt for therapy.    Pain Assessment  Pain Assessment: 0-10  Pain Level: 0  Oxygen Therapy  SpO2: 93 %  O2 Device: None (Room air)  Social/Functional History  Social/Functional History  Lives With: Alone  Type of Home: (condo)  Home Layout: Multi-level, Performs ADL's on one level, Able to Live on Main level with bedroom/bathroom  Home Access: Stairs to enter with rails  Entrance Stairs - Number of Steps: 2 ALBA, 12 steps down to basement  Bathroom Shower/Tub: Tub/Shower unit  Bathroom Toilet: Handicap height  Bathroom Equipment: Shower chair(pt refuses to use shower chair (per son))  Home Equipment: Rolling walker, Cane, Alert Button  ADL Assistance: Independent  Homemaking Assistance: Independent  Homemaking Responsibilities: Yes  Ambulation Assistance: Independent  Transfer Assistance: Independent  Active : Yes(not suppose to, per son)  Occupation: Retired  Leisure & Hobbies: watching tv  Additional Comments: Social history obtained from PT eval. Home nursing and PT 3x/wk per pt and son, denies falls and denies N/T in BLEs       Objective   Vision Exceptions: Wears glasses at all times  Hearing: (reports she has hearing aids but does not wear them. )    Orientation  Overall Orientation Status: Impaired  Orientation Level: Oriented to person;Oriented to situation;Oriented to place(Able to ID month and date but reported year was 2023. )  Observation/Palpation  Observation: IV  Balance  Sitting Balance: Supervision(EOB)  Standing Balance: Minimal assistance  Functional Mobility  Activity: To/from bathroom  Assist Level: Minimal assistance  Functional Mobility Comments: pt using IV pole for stability. Declining use of RW despite encouragement. Impulsive with mob to bathroom, likely d/t urgency. Toilet Transfers  Toilet - Technique: Ambulating  Equipment Used: Standard toilet  Toilet Transfer: Minimal assistance  ADL  Grooming: Contact guard assistance(in stance at sink to hand hygiene )  LE Dressing: (min assist to thread LE into clean absorbant brief)  Toileting: Contact guard assistance(light steady assist during clothing management. )  Quality of Movement Other  Comment: noted difficulty with [address] (bilaterally). Will cont to assess.       Bed mobility  Supine to Sit: Minimal assistance  Sit to Supine: Minimal assistance        Cognition  Overall Cognitive Status: Exceptions  Arousal/Alertness: Inconsistent responses to stimuli  Memory: Decreased recall of recent events;Decreased short term memory  Safety Judgement: Decreased awareness of need for assistance  Insights: Decreased awareness of deficits  Cognition Comment: inconsistent acknowledgment of cues provided for increased safety                 LUE AROM (degrees)  LUE AROM : WFL  RUE AROM (degrees)  RUE AROM : WFL  LUE Strength  LUE Strength Comment: WFL for tasks completed  RUE Strength  RUE Strength Comment: WFL for tasks completed                   Plan   Plan  Times per week: 3-5x week  Times per day: Daily  Current Treatment Recommendations: Balance Training, Functional Mobility Training, Endurance Training, Cognitive Reorientation, Self-Care / ADL, Safety Education & Training      AM-PAC Score        AM-[facility] Inpatient Daily Activity Raw Score: 18  AM-PAC Inpatient ADL T-Scale Score : 38.66  ADL Inpatient CMS 0-100% Score: 46.65  ADL Inpatient CMS G-Code Modifier : CK    Goals  Short term goals  Time Frame for Short term goals: 1 week from eval unoless otherwise indicated  Short term goal 1: Pt will complete 2 grooming tasks in stance at sink without cues for functional task completion by 3/26/19  Short term goal 2: Pt will complete toilet txfrs and toileting tasks with SBA and min cues for safety. Short term goal 3: Pt will complete LB dressing with set-up. Patient Goals   Patient goals : none stated       Therapy Time   Individual Concurrent Group Co-treatment   Time In 6444         Time Out 1624         Minutes 33         Timed Code Treatment Minutes: 23 Minutes     If patient is discharged prior to next occupational therapy treatment, this note will serve as the patient's discharge summary.       Adrian Choe, OT

## 2019-03-24 LAB
GLUCOSE BLD-MCNC: 144 MG/DL (ref 70–99)
GLUCOSE BLD-MCNC: 175 MG/DL (ref 70–99)
GLUCOSE BLD-MCNC: 211 MG/DL (ref 70–99)
GLUCOSE BLD-MCNC: 275 MG/DL (ref 70–99)
ORGANISM: ABNORMAL
PERFORMED ON: ABNORMAL
URINE CULTURE, ROUTINE: ABNORMAL
URINE CULTURE, ROUTINE: ABNORMAL

## 2019-03-24 PROCEDURE — 6370000000 HC RX 637 (ALT 250 FOR IP): Performed by: INTERNAL MEDICINE

## 2019-03-24 PROCEDURE — 2580000003 HC RX 258: Performed by: INTERNAL MEDICINE

## 2019-03-24 PROCEDURE — 6370000000 HC RX 637 (ALT 250 FOR IP)

## 2019-03-24 PROCEDURE — 6360000002 HC RX W HCPCS: Performed by: INTERNAL MEDICINE

## 2019-03-24 PROCEDURE — 1200000000 HC SEMI PRIVATE

## 2019-03-24 RX ORDER — INSULIN GLARGINE 100 [IU]/ML
5 INJECTION, SOLUTION SUBCUTANEOUS NIGHTLY
Status: DISCONTINUED | OUTPATIENT
Start: 2019-03-24 | End: 2019-03-27 | Stop reason: HOSPADM

## 2019-03-24 RX ORDER — AMLODIPINE BESYLATE 5 MG/1
10 TABLET ORAL DAILY
Status: DISCONTINUED | OUTPATIENT
Start: 2019-03-24 | End: 2019-03-27 | Stop reason: HOSPADM

## 2019-03-24 RX ADMIN — AMLODIPINE BESYLATE 10 MG: 5 TABLET ORAL at 10:58

## 2019-03-24 RX ADMIN — SODIUM CHLORIDE: 9 INJECTION, SOLUTION INTRAVENOUS at 02:58

## 2019-03-24 RX ADMIN — INSULIN LISPRO 2 UNITS: 100 INJECTION, SOLUTION INTRAVENOUS; SUBCUTANEOUS at 13:03

## 2019-03-24 RX ADMIN — OXYBUTYNIN CHLORIDE 2.5 MG: 5 TABLET ORAL at 10:58

## 2019-03-24 RX ADMIN — Medication 10 ML: at 11:00

## 2019-03-24 RX ADMIN — ENOXAPARIN SODIUM 30 MG: 30 INJECTION SUBCUTANEOUS at 10:58

## 2019-03-24 RX ADMIN — INSULIN LISPRO 1 UNITS: 100 INJECTION, SOLUTION INTRAVENOUS; SUBCUTANEOUS at 11:03

## 2019-03-24 RX ADMIN — METOPROLOL TARTRATE 50 MG: 50 TABLET ORAL at 21:22

## 2019-03-24 RX ADMIN — ATORVASTATIN CALCIUM 40 MG: 40 TABLET, FILM COATED ORAL at 10:58

## 2019-03-24 RX ADMIN — METOPROLOL TARTRATE 50 MG: 50 TABLET ORAL at 10:57

## 2019-03-24 RX ADMIN — INSULIN GLARGINE 5 UNITS: 100 INJECTION, SOLUTION SUBCUTANEOUS at 21:23

## 2019-03-24 RX ADMIN — INSULIN LISPRO 2 UNITS: 100 INJECTION, SOLUTION INTRAVENOUS; SUBCUTANEOUS at 21:23

## 2019-03-24 RX ADMIN — CEFTRIAXONE SODIUM 1 G: 1 INJECTION, POWDER, FOR SOLUTION INTRAMUSCULAR; INTRAVENOUS at 21:22

## 2019-03-24 RX ADMIN — Medication 10 ML: at 21:23

## 2019-03-24 RX ADMIN — Medication: at 13:59

## 2019-03-24 RX ADMIN — OXYBUTYNIN CHLORIDE 2.5 MG: 5 TABLET ORAL at 21:22

## 2019-03-24 ASSESSMENT — PAIN SCALES - GENERAL
PAINLEVEL_OUTOF10: 0

## 2019-03-24 NOTE — PROGRESS NOTES
Assessment complete. VSS. Denies any pain or discomfort at this time. Call light within reach. Will continue to monitor.

## 2019-03-24 NOTE — PROGRESS NOTES
Hospitalist Progress Note      PCP: Ny López MD    Date of Admission: 3/21/2019    Chief Complaint: confusion    Hospital Course:   82 y.o. female who presented to [facility] with above complaints. Patient presented to the ED via EMS for altered mental status. Patient currently confused and unable to provide any history for me. So all the history was obtained from the ER staff. Patient was apparently seen normal 2 days ago, and was last spoken to yesterday night by family. OT who went to her house today, the patient's son and she did not answer the door. The patient's son checked on her at around 5:30 PM today, the patient was apparently confused and he called EMS to come pick her up. EMS reported right sided weakness, but pt did not have focal weakness in ER. It'll status was apparently waxing and waning in the ER too where she was having periods of lucidity. Subjective: Still with similar confusion. No new complaints today.        Medications:  Reviewed    Infusion Medications    dextrose      sodium chloride 100 mL/hr at 03/24/19 0258     Scheduled Medications    amLODIPine  10 mg Oral Daily    atorvastatin  40 mg Oral Daily    metoprolol tartrate  50 mg Oral BID    oxybutynin  2.5 mg Oral BID    cefTRIAXone (ROCEPHIN) IV  1 g Intravenous Q24H    insulin lispro  0-6 Units Subcutaneous TID WC    insulin lispro  0-3 Units Subcutaneous Nightly    sodium chloride flush  10 mL Intravenous 2 times per day    enoxaparin  30 mg Subcutaneous Daily     PRN Meds: melatonin, glucose, dextrose, glucagon (rDNA), dextrose, sodium chloride flush, magnesium hydroxide, ondansetron      Intake/Output Summary (Last 24 hours) at 3/24/2019 0901  Last data filed at 3/24/2019 4909  Gross per 24 hour   Intake 3928.43 ml   Output 0 ml   Net 3928.43 ml       Physical Exam Performed:    BP (!) 178/67   Pulse 61   Temp 98.1 °F (36.7 °C) (Oral)   Resp 16   Ht 5' 5\" (1.651 m)   Wt 135 lb 1.6 oz (61.3 XR CHEST PORTABLE   Final Result   No acute abnormality. Overall clear appearing lungs. Assessment/Plan:    Active Hospital Problems    Diagnosis Date Noted    Acute encephalopathy [G93.40] 03/21/2019    Essential hypertension [I10] 01/27/2016    Type 2 diabetes mellitus without complication ([facility] [address] [D08.3] 01/27/2016    Mixed hyperlipidemia [E78.2] 01/27/2016    UTI (urinary tract infection) [N39.0] 05/02/2014     Acute metabolic encephalopathy - likely due to UTI. CT head was negative. Waxing an waning mental status likely due to infection from UTI. Treat underlying cause with antibiotics, and reassess mental status on a daily basis. If no improvement in 24-48 hrs can consider MRI to r/o CVA, low suspicion for CVA currently. Patient may have a difficult time in MRI due to confusion. Based on family report and her presentation in the hospital, strongly suspect this represents advancement of underlying dementia.     UTI (urinary tract infection) - iv rocephin started, culture growing enterococcus, awaiting sensitivities     Essential hypertension - uncontrolled on arrival, resume home medication and added norvasc. Stopped IVF.     Type 2 diabetes mellitus - last A1C 8.4, held metformin, started on SSI and 5u lantus with Accu-Cheks     Mixed hyperlipidemia - resume statin     DVT Prophylaxis: lovenox  Diet: DIET CARB CONTROL;   Dietary Nutrition Supplements: Frozen Oral Supplement  Code Status: Full Code    PT/OT Eval Status: ECF with PT/OT    Dispo - Likely ready for DC tomorrow, CM working on possible facility placement    Kay Santana MD

## 2019-03-24 NOTE — PLAN OF CARE
The patient will demonstrate an understanding of s/s & treatment, by verbalization,  with the goal of completion at discharge.

## 2019-03-24 NOTE — PROGRESS NOTES
Provided family with list of possible SNF from weekend discharge planner to narrow down choice facilities and follow up with case management this week.

## 2019-03-25 LAB
GLUCOSE BLD-MCNC: 162 MG/DL (ref 70–99)
GLUCOSE BLD-MCNC: 196 MG/DL (ref 70–99)
GLUCOSE BLD-MCNC: 208 MG/DL (ref 70–99)
GLUCOSE BLD-MCNC: 256 MG/DL (ref 70–99)
PERFORMED ON: ABNORMAL

## 2019-03-25 PROCEDURE — 6370000000 HC RX 637 (ALT 250 FOR IP): Performed by: NURSE PRACTITIONER

## 2019-03-25 PROCEDURE — 1200000000 HC SEMI PRIVATE

## 2019-03-25 PROCEDURE — 6360000002 HC RX W HCPCS: Performed by: INTERNAL MEDICINE

## 2019-03-25 PROCEDURE — 6370000000 HC RX 637 (ALT 250 FOR IP): Performed by: INTERNAL MEDICINE

## 2019-03-25 PROCEDURE — 97535 SELF CARE MNGMENT TRAINING: CPT

## 2019-03-25 PROCEDURE — 2580000003 HC RX 258: Performed by: INTERNAL MEDICINE

## 2019-03-25 RX ORDER — MIRTAZAPINE 15 MG/1
15 TABLET, FILM COATED ORAL NIGHTLY
Status: DISCONTINUED | OUTPATIENT
Start: 2019-03-25 | End: 2019-03-26

## 2019-03-25 RX ORDER — CEFDINIR 300 MG/1
300 CAPSULE ORAL EVERY 12 HOURS SCHEDULED
Status: DISCONTINUED | OUTPATIENT
Start: 2019-03-25 | End: 2019-03-27 | Stop reason: HOSPADM

## 2019-03-25 RX ORDER — METOCLOPRAMIDE HYDROCHLORIDE 5 MG/ML
INJECTION INTRAMUSCULAR; INTRAVENOUS
Status: DISPENSED
Start: 2019-03-25 | End: 2019-03-26

## 2019-03-25 RX ADMIN — INSULIN LISPRO 3 UNITS: 100 INJECTION, SOLUTION INTRAVENOUS; SUBCUTANEOUS at 17:30

## 2019-03-25 RX ADMIN — METOPROLOL TARTRATE 50 MG: 50 TABLET ORAL at 08:26

## 2019-03-25 RX ADMIN — CEFDINIR 300 MG: 300 CAPSULE ORAL at 21:00

## 2019-03-25 RX ADMIN — OXYBUTYNIN CHLORIDE 2.5 MG: 5 TABLET ORAL at 08:26

## 2019-03-25 RX ADMIN — INSULIN LISPRO 1 UNITS: 100 INJECTION, SOLUTION INTRAVENOUS; SUBCUTANEOUS at 08:27

## 2019-03-25 RX ADMIN — Medication 10 ML: at 08:26

## 2019-03-25 RX ADMIN — INSULIN GLARGINE 5 UNITS: 100 INJECTION, SOLUTION SUBCUTANEOUS at 20:33

## 2019-03-25 RX ADMIN — AMLODIPINE BESYLATE 10 MG: 5 TABLET ORAL at 08:26

## 2019-03-25 RX ADMIN — INSULIN LISPRO 1 UNITS: 100 INJECTION, SOLUTION INTRAVENOUS; SUBCUTANEOUS at 20:33

## 2019-03-25 RX ADMIN — MIRTAZAPINE 15 MG: 15 TABLET, FILM COATED ORAL at 20:33

## 2019-03-25 RX ADMIN — Medication 10 ML: at 21:00

## 2019-03-25 RX ADMIN — ATORVASTATIN CALCIUM 40 MG: 40 TABLET, FILM COATED ORAL at 08:26

## 2019-03-25 RX ADMIN — METOPROLOL TARTRATE 50 MG: 50 TABLET ORAL at 20:33

## 2019-03-25 RX ADMIN — CEFDINIR 300 MG: 300 CAPSULE ORAL at 13:33

## 2019-03-25 RX ADMIN — ENOXAPARIN SODIUM 30 MG: 30 INJECTION SUBCUTANEOUS at 08:26

## 2019-03-25 RX ADMIN — INSULIN LISPRO 1 UNITS: 100 INJECTION, SOLUTION INTRAVENOUS; SUBCUTANEOUS at 12:20

## 2019-03-25 RX ADMIN — OXYBUTYNIN CHLORIDE 2.5 MG: 5 TABLET ORAL at 20:33

## 2019-03-25 NOTE — PLAN OF CARE
The patient will demonstrate an understanding of s/s & treatment of hyperglycemia, by verbalization,  with the goal of completion at discharge.

## 2019-03-25 NOTE — PROGRESS NOTES
Assessment complete. VSS BP elevated prior to morning meds, will retake it. Denies pain. Set up to breakfast tray. Call light in reach.

## 2019-03-25 NOTE — PROGRESS NOTES
Occupational Therapy  Facility/Department: [facility] B3 - MED SURG  Daily Treatment Note  NAME: Bren Kilgore  : 1935  MRN: [id number]    Date of Service: 3/25/2019    Discharge Recommendations:  Subacute/Skilled Nursing Facility  OT Equipment Recommendations  Equipment Needed: No  Other: defer to rehab    Assessment   Performance deficits / Impairments: Decreased balance;Decreased functional mobility ; Decreased ADL status; Decreased cognition;Decreased high-level IADLs;Decreased fine motor control;Decreased endurance;Decreased strength;Decreased coordination;Decreased safe awareness  After treatment, pt found to be presenting with the above mentioned deficits. Pt would benefit from continued skilled occupational therapy to address these deficits, increasing safety and independence with ADL and functional mobility. Pt has good potential to meet therapy goals. Will continue to assess for discharge needs. Prognosis: Fair  REQUIRES OT FOLLOW UP: Yes  Activity Tolerance  Activity Tolerance: Patient Tolerated treatment well  Safety Devices  Safety Devices in place: Yes  Type of devices: Call light within reach;Gait belt;Nurse notified; Chair alarm in place; Left in chair         Patient Diagnosis(es): The primary encounter diagnosis was Acute encephalopathy. Diagnoses of Urinary tract infection without hematuria, site unspecified and Aphasia were also pertinent to this visit. has a past medical history of DJD (degenerative joint disease), Eczema, Elevated LFTs, HBP (high blood pressure), Hyperlipidemia, NIDDM (non-insulin dependent diabetes mellitus), and Scoliosis. has a past surgical history that includes Cholecystectomy; bladder suspension; Uterine Suspension; joint replacement (11); Tonsillectomy and adenoidectomy; Colonoscopy; and Colonoscopy (3/30/16).     Restrictions  Restrictions/Precautions  Restrictions/Precautions: General Precautions, Fall Risk  Position Activity Restriction  Other position/activity restrictions: up as tolerated     Subjective   General  Chart Reviewed: Yes  Patient assessed for rehabilitation services?: Yes  Family / Caregiver Present: Yes(son, daughter, and grandson)  Referring Practitioner: Yifan Pitts  Diagnosis: confusion, acute encephalopathy 2/2 UTI  Subjective  Subjective: Pt in room in chair. Agreeable to OT with encouragement. General Comment  Comments: RN cleared pt for therapy. Vital Signs  Patient Currently in Pain: Denies     Orientation  Orientation  Overall Orientation Status: Impaired  Orientation Level: Oriented to time;Oriented to person;Disoriented to situation;Disoriented to place     Objective    ADL  Feeding: Supervision;Setup(to take pill and drink seated)  Grooming: Contact guard assistance(to comb hair standing 2/2 decreased thoroughness; CGA to wash hands, wash face, brush teeth standing)  LE Dressing: Moderate assistance(to don/doff brief seated and standing)  Toileting: Minimal assistance;Setup(seated hygiene and clothing management)  Additional Comments: Cues needed for sequencing, attention to task, and continuation of task. Balance  Sitting Balance: Supervision  Standing Balance: Minimal assistance(with RW)    Functional Mobility  Functional - Mobility Device: Rolling Walker  Activity: To/from bathroom  Assist Level: Minimal assistance  Functional Mobility Comments: Pt also completed household distance functional mobility with min A and RW. Limited by fatigue. Needing cues for safe use of device, correct posture. Toilet Transfers  Toilet - Technique: Ambulating(with RW)  Equipment Used: Standard toilet  Toilet Transfer: Moderate assistance       Transfers  Sit to stand:  Moderate assistance  Stand to sit: Moderate assistance  Transfer Comments: Pt with unsafe technique despite education and VCs                          Cognition  Overall Cognitive Status: Exceptions  Arousal/Alertness: Inconsistent responses to stimuli  Following

## 2019-03-26 ENCOUNTER — APPOINTMENT (OUTPATIENT)
Dept: MRI IMAGING | Age: 84
DRG: 689 | End: 2019-03-26
Payer: MEDICARE

## 2019-03-26 PROBLEM — F01.518 VASCULAR DEMENTIA WITH BEHAVIOR DISTURBANCE: Chronic | Status: ACTIVE | Noted: 2019-03-26

## 2019-03-26 LAB
ANION GAP SERPL CALCULATED.3IONS-SCNC: 14 MMOL/L (ref 3–16)
BLOOD CULTURE, ROUTINE: NORMAL
BUN BLDV-MCNC: 12 MG/DL (ref 7–20)
CALCIUM SERPL-MCNC: 8.9 MG/DL (ref 8.3–10.6)
CHLORIDE BLD-SCNC: 96 MMOL/L (ref 99–110)
CO2: 28 MMOL/L (ref 21–32)
CREAT SERPL-MCNC: 1.1 MG/DL (ref 0.6–1.2)
GFR AFRICAN AMERICAN: 58
GFR NON-AFRICAN AMERICAN: 48
GLUCOSE BLD-MCNC: 121 MG/DL (ref 70–99)
GLUCOSE BLD-MCNC: 163 MG/DL (ref 70–99)
GLUCOSE BLD-MCNC: 187 MG/DL (ref 70–99)
HCT VFR BLD CALC: 39.4 % (ref 36–48)
HEMOGLOBIN: 12.9 G/DL (ref 12–16)
MCH RBC QN AUTO: 27.5 PG (ref 26–34)
MCHC RBC AUTO-ENTMCNC: 32.7 G/DL (ref 31–36)
MCV RBC AUTO: 84.1 FL (ref 80–100)
PDW BLD-RTO: 14.6 % (ref 12.4–15.4)
PERFORMED ON: ABNORMAL
PERFORMED ON: ABNORMAL
PLATELET # BLD: 253 K/UL (ref 135–450)
PMV BLD AUTO: 10.1 FL (ref 5–10.5)
POTASSIUM REFLEX MAGNESIUM: 3.7 MMOL/L (ref 3.5–5.1)
RBC # BLD: 4.69 M/UL (ref 4–5.2)
SODIUM BLD-SCNC: 138 MMOL/L (ref 136–145)
WBC # BLD: 10.8 K/UL (ref 4–11)

## 2019-03-26 PROCEDURE — 85027 COMPLETE CBC AUTOMATED: CPT

## 2019-03-26 PROCEDURE — 70551 MRI BRAIN STEM W/O DYE: CPT

## 2019-03-26 PROCEDURE — 36415 COLL VENOUS BLD VENIPUNCTURE: CPT

## 2019-03-26 PROCEDURE — 99223 1ST HOSP IP/OBS HIGH 75: CPT | Performed by: PSYCHIATRY & NEUROLOGY

## 2019-03-26 PROCEDURE — 6370000000 HC RX 637 (ALT 250 FOR IP): Performed by: NURSE PRACTITIONER

## 2019-03-26 PROCEDURE — 6370000000 HC RX 637 (ALT 250 FOR IP): Performed by: INTERNAL MEDICINE

## 2019-03-26 PROCEDURE — 80048 BASIC METABOLIC PNL TOTAL CA: CPT

## 2019-03-26 PROCEDURE — 97535 SELF CARE MNGMENT TRAINING: CPT

## 2019-03-26 PROCEDURE — 2580000003 HC RX 258: Performed by: INTERNAL MEDICINE

## 2019-03-26 PROCEDURE — 99221 1ST HOSP IP/OBS SF/LOW 40: CPT | Performed by: PSYCHIATRY & NEUROLOGY

## 2019-03-26 PROCEDURE — 6360000002 HC RX W HCPCS: Performed by: INTERNAL MEDICINE

## 2019-03-26 PROCEDURE — 1200000000 HC SEMI PRIVATE

## 2019-03-26 RX ORDER — ASPIRIN 81 MG/1
81 TABLET, CHEWABLE ORAL DAILY
Status: DISCONTINUED | OUTPATIENT
Start: 2019-03-26 | End: 2019-03-27 | Stop reason: HOSPADM

## 2019-03-26 RX ADMIN — ATORVASTATIN CALCIUM 40 MG: 40 TABLET, FILM COATED ORAL at 08:21

## 2019-03-26 RX ADMIN — ENOXAPARIN SODIUM 40 MG: 40 INJECTION SUBCUTANEOUS at 08:25

## 2019-03-26 RX ADMIN — ASPIRIN 81 MG 81 MG: 81 TABLET ORAL at 09:14

## 2019-03-26 RX ADMIN — OXYBUTYNIN CHLORIDE 2.5 MG: 5 TABLET ORAL at 08:21

## 2019-03-26 RX ADMIN — METOPROLOL TARTRATE 50 MG: 50 TABLET ORAL at 08:21

## 2019-03-26 RX ADMIN — INSULIN GLARGINE 5 UNITS: 100 INJECTION, SOLUTION SUBCUTANEOUS at 21:47

## 2019-03-26 RX ADMIN — Medication 10 ML: at 22:33

## 2019-03-26 RX ADMIN — Medication 10 ML: at 08:25

## 2019-03-26 RX ADMIN — INSULIN LISPRO 2 UNITS: 100 INJECTION, SOLUTION INTRAVENOUS; SUBCUTANEOUS at 21:48

## 2019-03-26 RX ADMIN — INSULIN LISPRO 1 UNITS: 100 INJECTION, SOLUTION INTRAVENOUS; SUBCUTANEOUS at 08:27

## 2019-03-26 RX ADMIN — CEFDINIR 300 MG: 300 CAPSULE ORAL at 21:46

## 2019-03-26 RX ADMIN — AMLODIPINE BESYLATE 10 MG: 5 TABLET ORAL at 08:21

## 2019-03-26 RX ADMIN — OXYBUTYNIN CHLORIDE 2.5 MG: 5 TABLET ORAL at 21:45

## 2019-03-26 RX ADMIN — CEFDINIR 300 MG: 300 CAPSULE ORAL at 08:40

## 2019-03-26 ASSESSMENT — PAIN SCALES - WONG BAKER
WONGBAKER_NUMERICALRESPONSE: 0

## 2019-03-26 NOTE — CARE COORDINATION
[address] Transitions Interview     3/26/2019    Patient: Ammon Boast Patient : 1935   MRN: [id number]  Reason for Admission: UTI   RARS: Readmission Risk Score: 15         Spoke with: Kory Vasquez patient's daughter       Readmission Risk  Patient Active Problem List   Diagnosis    Status post hip replacement    Osteoarthritis    Scoliosis    Elevated LFTs    Ganglion cyst    UTI (urinary tract infection)    Essential hypertension    [facility] mellitus without complication ([facility] [address]    Mixed hyperlipidemia    Primary osteoarthritis of left hip    DDD (degenerative disc disease), lumbar    History of total right hip replacement    Subclinical hypothyroidism    Moderate malnutrition ([facility] [address]    Acute encephalopathy    Vascular dementia with behavior disturbance       Inpatient Assessment  Care Transitions Summary    Care Transitions Inpatient Review  Medication Review  Do you have all of your prescriptions and are they filled?:  Yes   Are you able to afford your medications?:  Yes  How often do you have difficulty taking your medications?:  I always take them as prescribed. Housing Review  Who do you live with?:  Alone  Are you an active caregiver in your home?:  No  Social Support  Do you have a ?:  No  Do you have a [address] [place] [address]?:  Yes  [address] Name:  [address] [place] [address]  Patient DME:  Straight cane, Walker, Shower chair, Other  Other Patient DME:  Life Alert, lift chair, new full power lift chair   Functional Review  Ability to seek help/take action for Emergent/Urgent situations i.e. fire, crime, inclement weather or health crisis. :  Independent  Ability handle personal hygiene needs (bathing/dressing/grooming): Independent  Ability to manage medications: Independent  Ability to prepare food:  Independent  Ability to maintain home (clean home, laundry):   Independent  Ability to drive and/or has transportation:  Independent  Ability to do shopping:  Independent  Ability to manage finances: Independent  Is patient able to live independently?:  Yes  Hearing and Vision  Visual Impairment:  Reading glasses  Hearing Impairment:  Hard of hearing  Care Transitions Interventions       Met with patient to discuss care transition. Role of CTC and care transition program explained to patient's daughter Tish Bennett, d/t patient having confusion at this time. Per Elma patient resides in private condo alone and was independent with ADL/IADL's and active with [facility]. Patient was found to have AMS and brought in for evaluation. Tish Bennett stated that patient also was instructed not to drive and patient had driven at two different times. Discussed with Elma that PT/OT recommending SNF and per chart review referral has been made to [facility] and Tish Bennett is in agreement with the plan. If meets criteria, patient agreeable to participate in care transition program post discharge. Encouraged patient to ask to speak with  on the unit should any needs arise. Notified CM FAYE DUDLEY of the above. Follow Up  Future Appointments   Date Time Provider Janak Muhammad   5/22/2019  2:40 PM MD VIGNESH Mata AND NAPOLEON MEDELLIN   5/23/2019 10:40 AM MD VIGNESH Mata AND NAPOLEON MEDELLIN       Health Maintenance  There are no preventive care reminders to display for this patient.     Becky Vegas RN, BSN, [address] Transitions Coordinator    [phone]

## 2019-03-26 NOTE — PROGRESS NOTES
events;Decreased short term memory  Problem Solving: Decreased awareness of errors  Insights: Decreased awareness of deficits  Initiation: Requires cues for some  Sequencing: Requires cues for some  Cognition Comment: inconsistent acknowledgment of cues provided for increased safety         Education: Role of OT, safe t/f training, safe use of DME, awareness of deficits, discharge planning, ADL as therapeutic exercise, importance of OOB, cognitive orientation     Plan   Times per week: 3-5x week  Times per day: Daily  Current Treatment Recommendations: Balance Training, Functional Mobility Training, Endurance Training, Cognitive Reorientation, Self-Care / ADL, Safety Education & Training     AM-PAC Score  AM-[facility] Inpatient Daily Activity Raw Score: 16  AM-PAC Inpatient ADL T-Scale Score : 35.96  ADL Inpatient CMS 0-100% Score: 53.32  ADL Inpatient CMS G-Code Modifier : CK     Goals  Short term goals  Time Frame for Short term goals: 1 week from eval unless otherwise indicated  Short term goal 1: Pt will complete 2 grooming tasks in stance at sink without cues for functional task completion by 3/26/19  Short term goal 2: Pt will complete toilet txfrs and toileting tasks with SBA and min cues for safety. Short term goal 3: Pt will complete LB dressing with set-up. Patient Goals   Patient goals : none stated     Therapy Time    Individual Concurrent Group Co-treatment   Time In 0850      Time Out 0915      Minutes 25      Timed Code Treatment Minutes: 25 Minutes     If patient is discharged prior to next treatment session, this note will serve as the discharge summary.   Trinidad Liu OTR/CHELSEY #[id number]

## 2019-03-26 NOTE — PROGRESS NOTES
Pt up in bed, confused, NIH scale completed on pt, right arm drifted to bed, speech is slurred, pt leaning to right.      Code stroke called at [address]

## 2019-03-26 NOTE — PROGRESS NOTES
Hospitalist Progress Note      PCP: Kevin Mckay MD    Date of Admission: 3/21/2019    Chief Complaint: confusion    Hospital Course:   82 y.o. female who presented to [facility] with above complaints. Patient presented to the ED via EMS for altered mental status. Patient currently confused and unable to provide any history for me. So all the history was obtained from the ER staff. Patient was apparently seen normal 2 days ago, and was last spoken to yesterday night by family. OT who went to her house today, the patient's son and she did not answer the door. The patient's son checked on her at around 5:30 PM today, the patient was apparently confused and he called EMS to come pick her up. EMS reported right sided weakness, but pt did not have focal weakness in ER. It'll status was apparently waxing and waning in the ER too where she was having periods of lucidity. Subjective: Overnight ' code stroke was called for change in mental status and arm drift, slurred speech. By the time team arrived drift and slurred speech had resolved and no further intervention was needed. This am she is still slow to respond and lethargic. Poor historian. Of note she did receive her first dose of Remeron last evening.       Medications:  Reviewed    Infusion Medications    dextrose       Scheduled Medications    enoxaparin  40 mg Subcutaneous Daily    cefdinir  300 mg Oral 2 times per day    amLODIPine  10 mg Oral Daily    insulin glargine  5 Units Subcutaneous Nightly    atorvastatin  40 mg Oral Daily    metoprolol tartrate  50 mg Oral BID    oxybutynin  2.5 mg Oral BID    insulin lispro  0-6 Units Subcutaneous TID WC    insulin lispro  0-3 Units Subcutaneous Nightly    sodium chloride flush  10 mL Intravenous 2 times per day     PRN Meds: melatonin, glucose, dextrose, glucagon (rDNA), dextrose, sodium chloride flush, magnesium hydroxide, ondansetron      Intake/Output Summary (Last 24 hours) at 3/26/2019 0958  Last data filed at 3/25/2019 1907  Gross per 24 hour   Intake 460 ml   Output --   Net 460 ml       Physical Exam Performed:    BP (!) 166/82   Pulse 75   Temp 99.1 °F (37.3 °C) (Oral)   Resp 16   Ht 5' 5\" (1.651 m)   Wt 135 lb 1.6 oz (61.3 kg)   SpO2 92%   BMI 22.48 kg/m²     General appearance:  No apparent distress, appears stated age, lethargic   HEENT:  Normal cephalic, atraumatic without obvious deformity. Pupils equal, round, and reactive to light. Extra ocular muscles intact. Conjunctivae/corneas clear. Neck: Supple, with full range of motion. No jugular venous distention. Trachea midline. Respiratory:  Normal respiratory effort. Clear to auscultation, bilaterally without Rales/Wheezes/Rhonchi. Cardiovascular:  Regular rate and rhythm with normal S1/S2 without murmurs, rubs or gallops. Abdomen: Soft, non-tender, non-distended with normal bowel sounds. Musculoskeletal:  No clubbing, cyanosis or edema bilaterally. Full range of motion without deformity. Skin: Skin color, texture, turgor normal.  No rashes or lesions. Neurologic: Lethargic, speech is clear by slow. Place notes as [place], unable to answer year or president she closed her eyes in eversion. Follow simple commands moves all extremities equal time X 4, no drift face appears symmetrical and tongue is ML    Psychiatric:  Flat affect, quite. Possible hx of dementia, family felt she was depressed at home   Capillary Refill: Brisk,< 3 seconds   Peripheral Pulses: +2 palpable, equal bilaterally     Labs:   No results for input(s): WBC, HGB, HCT, PLT in the last 72 hours. No results for input(s): NA, K, CL, CO2, BUN, CREATININE, CALCIUM, PHOS in the last 72 hours. Invalid input(s): MAGNES  No results for input(s): AST, ALT, BILIDIR, BILITOT, ALKPHOS in the last 72 hours. No results for input(s): INR in the last 72 hours. No results for input(s): Hannah Jubilee in the last 72 hours.     Urinalysis:      Lab Results Component Value Date    NITRU POSITIVE 03/21/2019    WBCUA >100 03/21/2019    BACTERIA 4+ 03/21/2019    RBCUA see below 03/21/2019    BLOODU SMALL 03/21/2019    SPECGRAV 1.015 03/21/2019    GLUCOSEU Negative 03/21/2019    GLUCOSEU NEGATIVE 04/01/2011       Radiology:  CT HEAD WO CONTRAST   Final Result   No acute intracranial abnormality. XR CHEST PORTABLE   Final Result   No acute abnormality. Overall clear appearing lungs. MRI BRAIN WO CONTRAST    (Results Pending)           Assessment/Plan:    Active Hospital Problems    Diagnosis Date Noted    Acute encephalopathy [G93.40] 03/21/2019    Essential hypertension [I10] 01/27/2016    Type 2 diabetes mellitus without complication ([facility] [address] [T03.9] 01/27/2016    Mixed hyperlipidemia [E78.2] 01/27/2016    UTI (urinary tract infection) [N39.0] 05/02/2014     Acute metabolic encephalopathy - likely due to UTI. CT head was negative. Waxing an waning mental status likely due to infection from UTI. Possible neurogenic. Mental status changes- and Neuro exam concerns: I suspect issues overnight and lethargy are side effects of initiation of Remeron since it co insides with dosing. Will stop. - MRI was obtained R/O acute CVA howeer does demonstate enlarged ventricles ? ? If NPH is cause of isses. Neuology consult   - continue asa and statin   - PT/OT and SLP     - Neuro checks and safety      UTI (urinary tract infection) - iv rocephin started, UC with Enterococcus Faecalis   - change ABX to PO omnicef for 7 day course      Essential hypertension - uncontrolled on arrival, resume home medication and added norvasc.     Type 2 diabetes mellitus - last A1C 8.4, held metformin while IP, started on SSI with Basal bolus  with Accu-Cheks     Mixed hyperlipidemia - resume statin     Suspect underlying depression/dementia- pt was very quite and did not contribute much to conversation.  Son at bedside feels she does not eat much at home and doesn't care for self as she should. - start Remeron  But had stop 2/2 to above   - Family asked for Psych eval. Request address competency and possible recommendation for medications if warranted- appreciate assistance     Goals and plans: PT has not been caring for her self at home or been reliable taling medication using medi alert device, tend to not answer door when Joel Campuzano comes to visit. Unable to provide 24  Hour supervision uncertain she would be willing able to call for help if needed or take meal reliably.   - PT/OT rec's for SNF   - CM made aware  - PT and family agreeable to SNF for rehab however pt was not     DVT Prophylaxis: lovenox  Diet: DIET CARB CONTROL; Dietary Nutrition Supplements: Frozen Oral Supplement  Code Status: Full Code    PT/OT Eval Status: ordered and seeing.  Rec's reviewed     Dispo - 1-2 days pending MRI results     REI Crawford - CNP

## 2019-03-26 NOTE — CONSULTS
joint disease)     Eczema     Elevated LFTs     HBP (high blood pressure)     Hyperlipidemia     NIDDM (non-insulin dependent diabetes mellitus)     Scoliosis      Family History   Problem Relation Age of Onset    Cancer Mother     Heart Disease Father     Cancer Sister      Past Surgical History:   Procedure Laterality Date    BLADDER SUSPENSION      CHOLECYSTECTOMY      COLONOSCOPY      COLONOSCOPY  3/30/16    tics    JOINT REPLACEMENT  4/12/11    rigth total hip replacement with cell saver and platelet gel.  TONSILLECTOMY AND ADENOIDECTOMY      UTERINE SUSPENSION       Past Surgical History:   Procedure Laterality Date    BLADDER SUSPENSION      CHOLECYSTECTOMY      COLONOSCOPY      COLONOSCOPY  3/30/16    tics    JOINT REPLACEMENT  4/12/11    rigth total hip replacement with cell saver and platelet gel.     TONSILLECTOMY AND ADENOIDECTOMY      UTERINE SUSPENSION       Family History   Problem Relation Age of Onset    Cancer Mother     Heart Disease Father     Cancer Sister      Social History     Tobacco Use    Smoking status: Never Smoker    Smokeless tobacco: Never Used   Substance Use Topics    Alcohol use: Yes     Comment: rarely    Drug use: No     Allergies   Allergen Reactions    Metformin And Related Other (See Comments)     diarrhea     Darvocet [Propoxyphene N-Acetaminophen] Nausea And Vomiting    Propoxyphene Nausea And Vomiting     Current Facility-Administered Medications   Medication Dose Route Frequency Provider Last Rate Last Dose    enoxaparin (LOVENOX) injection 40 mg  40 mg Subcutaneous Daily Cali Colón MD   40 mg at 03/26/19 0825    aspirin chewable tablet 81 mg  81 mg Oral Daily REI Toro CNP   81 mg at 03/26/19 0914    cefdinir (OMNICEF) capsule 300 mg  300 mg Oral 2 times per day REI Toro CNP   300 mg at 03/26/19 0840    amLODIPine (NORVASC) tablet 10 mg  10 mg Oral Daily Dustin Tapia MD   10 mg at 03/26/19 4412 Height:           General appearance: Confused   Eye: PRRR  Fundus: Funduscopic examination could not be performed due to patient's poor cooperation. Neck: supple  Cardiovascular:          No lower leg edema with good pulsation. Mental Status: Waxing and waning. AAO times one. Poor attention and concentration span. Easily distracted. Can not follow complex directions. Fluent speech. Unable to assess recent or remote memory due to lethargy. Poor fund of knowledge. Cranial Nerves:   II: Visual fields: NT due to confusion. Pupils: equal, round, reactive to light  III,IV,VI: Extra Ocular Movements are intact. No nystagmus  V: Facial sensation : NT due to confusion  VII: Facial strength and movements: intact and symmetric  VIII: Hearing: NT due to confusion  IX: Palate elevation NT due to confusion  XI: Shoulder shrug: NT due to confusion  XII: Tongue movements: NT due to confusion  Musculoskeletal:  The patient can move all 4 extremities. No apparent focal weakness. Tone: Normal tone. No rigidity. Reflexes: Bilateral biceps 2/4, triceps 2/4, brachial radialis 2/4, knee 2/4 and ankle 2/4. Planters: flexor bilaterally.   Coordination: NT due to confusion  Sensation: NT due to confusion  Gait/Posture: NT due to confusion    Data:  LABS:   Lab Results   Component Value Date     03/26/2019    K 3.7 03/26/2019    CL 96 03/26/2019    CO2 28 03/26/2019    BUN 12 03/26/2019    CREATININE 1.1 03/26/2019    GFRAA 58 03/26/2019    GFRAA >60 01/22/2013    LABGLOM 48 03/26/2019    GLUCOSE 187 03/26/2019    MG 2.00 02/27/2019    CALCIUM 8.9 03/26/2019     Lab Results   Component Value Date    WBC 10.8 03/26/2019    RBC 4.69 03/26/2019    HGB 12.9 03/26/2019    HCT 39.4 03/26/2019    MCV 84.1 03/26/2019    RDW 14.6 03/26/2019     03/26/2019     Lab Results   Component Value Date    INR 1.01 03/21/2019    PROTIME 11.5 03/21/2019       Neuroimaging and/or  labs reviewed by me and DIPAK family    Impression:  Acute delirium superimposed on dementia. Abnormal MRI of the brain likely incidental.  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 Less likely NPH. Chronic cognitive impairment and underlying dementia. Possible AD. Hypertension, not controlled  Depression  Hospital-acquired delirium  Insomnia  Diabetes, not controlled. Recent A1c 8.4  Hypothyroid  UTI     Recommendation:  Long discussion with the patient's family regarding outcome from hospital-induced psychosis and delirium. We'll also discussed in details results of her MRI of the brain. Avoid sedatives and benzodiazepines  Can use melatonin. DC Remeron  Continue aspirin  Statin  Blood sugar monitoring  ISS  PT, OT and speech  Telemetry  Continue current blood pressure medications and avoid blood pressure below 140/90  Check TSH and B12. Stroke prevention and outcome for dementia was discussed with the patient's family today  Continue hydration and antibiotics  DC planning when medically stable  Follow-up with me outpatient with family to discuss management of her dementia. Thank you for referring such patient. If you have any questions regarding my consult note, please don't hesitate to call me. Socorro Peterson MD  [phone]    This dictation was generated by voice recognition computer software.  Although all attempts are made to edit the dictation for accuracy, there may be errors in the  transcription that are not intended

## 2019-03-26 NOTE — PROGRESS NOTES
Met with daughter today to discuss care. To dictate consult  Apparently Lexi Ayala had been driving but family have seen a deterioration in memory. Family is concerned that she has taken a dramatic shift with mood lability with irritability and then appearing withdrawn. MRI  was negative for stroke but has white matter disease and enlarged ventricles. Lexi Ayala would not communicate with me today . Daughter is concerned that we are missing something with her care .     Would recommend that Neurology be involved to help explain MRI and neurological changes(family is requesting this.)    Added Seroquel 25 mg HS prn insomnia

## 2019-03-27 VITALS
HEART RATE: 77 BPM | DIASTOLIC BLOOD PRESSURE: 57 MMHG | RESPIRATION RATE: 16 BRPM | BODY MASS INDEX: 22.51 KG/M2 | WEIGHT: 135.1 LBS | HEIGHT: 65 IN | TEMPERATURE: 97.8 F | SYSTOLIC BLOOD PRESSURE: 101 MMHG | OXYGEN SATURATION: 94 %

## 2019-03-27 LAB
CULTURE, BLOOD 2: NORMAL
FOLATE: 11.11 NG/ML (ref 4.78–24.2)
GLUCOSE BLD-MCNC: 145 MG/DL (ref 70–99)
GLUCOSE BLD-MCNC: 149 MG/DL (ref 70–99)
GLUCOSE BLD-MCNC: 174 MG/DL (ref 70–99)
GLUCOSE BLD-MCNC: 264 MG/DL (ref 70–99)
GLUCOSE BLD-MCNC: 296 MG/DL (ref 70–99)
MAGNESIUM: 1.8 MG/DL (ref 1.8–2.4)
PERFORMED ON: ABNORMAL
TSH REFLEX: 3.62 UIU/ML (ref 0.27–4.2)
VITAMIN B-12: 436 PG/ML (ref 211–911)

## 2019-03-27 PROCEDURE — 6370000000 HC RX 637 (ALT 250 FOR IP): Performed by: INTERNAL MEDICINE

## 2019-03-27 PROCEDURE — 6370000000 HC RX 637 (ALT 250 FOR IP): Performed by: NURSE PRACTITIONER

## 2019-03-27 PROCEDURE — 82607 VITAMIN B-12: CPT

## 2019-03-27 PROCEDURE — 36415 COLL VENOUS BLD VENIPUNCTURE: CPT

## 2019-03-27 PROCEDURE — 84443 ASSAY THYROID STIM HORMONE: CPT

## 2019-03-27 PROCEDURE — 92610 EVALUATE SWALLOWING FUNCTION: CPT

## 2019-03-27 PROCEDURE — 2580000003 HC RX 258: Performed by: INTERNAL MEDICINE

## 2019-03-27 PROCEDURE — 97535 SELF CARE MNGMENT TRAINING: CPT

## 2019-03-27 PROCEDURE — 99232 SBSQ HOSP IP/OBS MODERATE 35: CPT | Performed by: PSYCHIATRY & NEUROLOGY

## 2019-03-27 PROCEDURE — 83735 ASSAY OF MAGNESIUM: CPT

## 2019-03-27 PROCEDURE — 92526 ORAL FUNCTION THERAPY: CPT

## 2019-03-27 PROCEDURE — 97110 THERAPEUTIC EXERCISES: CPT

## 2019-03-27 PROCEDURE — 82746 ASSAY OF FOLIC ACID SERUM: CPT

## 2019-03-27 PROCEDURE — 6360000002 HC RX W HCPCS: Performed by: INTERNAL MEDICINE

## 2019-03-27 RX ORDER — AMLODIPINE BESYLATE 10 MG/1
10 TABLET ORAL DAILY
Qty: 30 TABLET | Refills: 3 | Status: SHIPPED | OUTPATIENT
Start: 2019-03-28 | End: 2019-05-16 | Stop reason: SDUPTHER

## 2019-03-27 RX ORDER — ASPIRIN 81 MG/1
81 TABLET, CHEWABLE ORAL DAILY
Qty: 30 TABLET | Refills: 3 | Status: ON HOLD | OUTPATIENT
Start: 2019-03-28 | End: 2019-12-08 | Stop reason: HOSPADM

## 2019-03-27 RX ORDER — CEFDINIR 300 MG/1
300 CAPSULE ORAL EVERY 12 HOURS SCHEDULED
Qty: 14 CAPSULE | Refills: 0 | Status: SHIPPED | OUTPATIENT
Start: 2019-03-27 | End: 2019-04-03

## 2019-03-27 RX ADMIN — OXYBUTYNIN CHLORIDE 2.5 MG: 5 TABLET ORAL at 08:20

## 2019-03-27 RX ADMIN — ENOXAPARIN SODIUM 40 MG: 40 INJECTION SUBCUTANEOUS at 08:23

## 2019-03-27 RX ADMIN — AMLODIPINE BESYLATE 10 MG: 5 TABLET ORAL at 08:20

## 2019-03-27 RX ADMIN — ATORVASTATIN CALCIUM 40 MG: 40 TABLET, FILM COATED ORAL at 08:20

## 2019-03-27 RX ADMIN — METOPROLOL TARTRATE 50 MG: 50 TABLET ORAL at 08:19

## 2019-03-27 RX ADMIN — ASPIRIN 81 MG 81 MG: 81 TABLET ORAL at 08:19

## 2019-03-27 RX ADMIN — INSULIN LISPRO 3 UNITS: 100 INJECTION, SOLUTION INTRAVENOUS; SUBCUTANEOUS at 12:21

## 2019-03-27 RX ADMIN — Medication 10 ML: at 08:22

## 2019-03-27 RX ADMIN — CEFDINIR 300 MG: 300 CAPSULE ORAL at 09:00

## 2019-03-27 ASSESSMENT — PAIN SCALES - WONG BAKER: WONGBAKER_NUMERICALRESPONSE: 0

## 2019-03-27 NOTE — PROGRESS NOTES
Speech Language Pathology  Facility/Department: [place] [address] - MED SURG   BEDSIDE SWALLOW EVALUATION    NAME: Dante Us  : 1935  MRN: [id number]    ADMISSION DATE: 3/21/2019  ADMITTING DIAGNOSIS: has Status post hip replacement; DM (diabetes mellitus), secondary, uncontrolled, w/neurologic complic ([address] Osteoarthritis; Scoliosis; Elevated LFTs; HTN (hypertension), benign; Ganglion cyst; UTI (urinary tract infection); Essential hypertension; Type 2 diabetes mellitus without complication ([address] Mixed hyperlipidemia; Primary osteoarthritis of left hip; DDD (degenerative disc disease), lumbar; History of total right hip replacement; Subclinical hypothyroidism; Moderate malnutrition ([address] Acute encephalopathy; Vascular dementia with behavior disturbance; Delirious; Dementia due to Alzheimer's disease; and Dyslipidemia on their problem list.  ONSET DATE: 2019    Recent Chest Xray/CT of Chest: (2019)  Impression   No acute abnormality.  Overall clear appearing lungs. Date of Eval: 3/27/2019  Evaluating Therapist: Annette Carreno    Current Diet level:  Current Diet : Regular  Current Liquid Diet : Thin    Primary Complaint  Patient Complaint: None    Pain:  Pain Assessment  Patient Currently in Pain: Denies  Pain Assessment: Faces  Julio-Del Rosario Pain Rating: No hurt  Pain Level: 0    Reason for Referral  Dante Us was referred for a bedside swallow evaluation to assess the efficiency of her swallow function, identify signs and symptoms of aspiration and make recommendations regarding safe dietary consistencies, effective compensatory strategies, and safe eating environment.     Impression  Dysphagia Diagnosis: Mild pharyngeal stage dysphagia  Dysphagia Outcome Severity Scale: Level 6: Within functional limits/Modified independence     Dysphagia Impression : Mild pharyngeal dysphagia characterized by reduced laryngeal elevation based upon palpation of anterior neck during the swallow, with throat clear noted with thin liquids by straw sips. No overt s/s of aspiration noted with thins by cup or tsp, no s/s pen/asp with nectar by tsp, cup, straw. Pt appeared to tolerate all trials, puree, mech soft, soft solid, regular solid, however pt will require assist with tray set up and encouragement to eat. Mastication was mildly prolonged but efficient, bolus control, formation, and clearance all WFL. Swallow initiation timely. No coughing, no throat clearing, no wet vocal quality, no change in respiration rate observed. No overt s/s of aspiration. As pt has not been eating much at home per family, would like to keep a liberal diet if pt is able to tolerate at mealtime. Recommend Regular food (assist with tray set-up, assist feed) / Thin liquids--no straws. Treatment Plan  Requires SLP Intervention: Yes  Duration/Frequency of Treatment: 2-3x/wk  D/C Recommendations: 24 hour supervision/assistance     Recommended Diet and Intervention  Diet Solids Recommendation: Regular(assist with tray set-up, assist feed)  Liquid Consistency Recommendation: Thin  Recommended Form of Meds: PO  Therapeutic Interventions: Diet tolerance monitoring;Patient/Family education    Compensatory Swallowing Strategies  Compensatory Swallowing Strategies: Assist feed;Eat/Feed slowly; Small bites/sips; No straws;Upright as possible for all oral intake;Remain upright for 30-45 minutes after meals    Treatment/Goals  Short-term Goals  Timeframe for Short-term Goals: 2-3x/wk  Goal 1: Pt will tolerate thin liquids by cup sips in 10/10 trials with no overt s/s of aspiration. Goal 2: Pt will tolerate regular solids with min assist for tray set up in 10/10 trials with no overt s/s of aspiration. Goal 3: Pt will demonstrate understanding of recommended safe swallow strategies with 100% accuracy given no cues.    Long-term Goals  Timeframe for Long-term Goals: 1 week  Goal 1: Pt will tolerate least restrictive diet w/o overt s/s of aspiration. General  Chart Reviewed: Yes  Comments: RN and family report that pt will occasionally cough with thin liquids. Subjective  Subjective: Alert this date, and agreeable to evaluation. Behavior/Cognition: Alert; Cooperative;Pleasant mood  Respiratory Status: Room air  O2 Device: None (Room air)  Communication Observation: Functional  Follows Directions: Simple  Dentition: Adequate  Patient Positioning: Upright in chair  Baseline Vocal Quality: Wet  Prior Dysphagia History: coughing noted with thin liquid by RN yesterday, and by pt's daughter occasionally  Consistencies Administered: Mechanical soft; Soft solid;Reg solid; Thin - cup; Thin - straw; Thin - teaspoon;Nectar - straw;Nectar - cup;Nectar - teaspoon    Vision/Hearing  Vision  Vision: Impaired  Vision Exceptions: Wears glasses at all times  Hearing  Hearing: Exceptions to [facility]  Hearing Exceptions: Hard of hearing/hearing concerns    Oral Motor Deficits  Oral/Motor  Oral Motor: Within functional limits    Oral Phase Dysfunction  Oral Phase  Oral Phase: WFL  Oral Phase  Oral Phase - Comment: Mildly prolonged but functional mastication of regular solids. Indicators of Pharyngeal Phase Dysfunction   Pharyngeal Phase  Pharyngeal Phase: Exceptions  Indicators of Pharyngeal Phase Dysfunction  Decreased Laryngeal Elevation: All  Throat Clearing - Immediate: Thin  Pharyngeal Phase   Pharyngeal: Mildly reduced laryngeal elevation, throat clear with thin liquids by straw; no throat clear noted with cup sips thin and nectar by tsp, cup and straw. Prognosis  Prognosis  Prognosis for safe diet advancement: fair  Barriers to reach goals: cognitive deficits;age  Individuals consulted  Consulted and agree with results and recommendations: Patient; Family member  Family member consulted: daughter    Education  Patient Education: Educated pt and daughter regarding role of SLP for dysphagia, recommendations from evaluation, s/s of aspiration, and aspiration precautions.    Patient Education Response: Needs reinforcement;Verbalizes understanding    Therapy Time  SLP Individual Minutes  Time In: 0920  Time Out: 1000  Minutes: 36     Dys Eval / Dys Tx    Romain Hitchcock M.A., Selin Frye  Speech Language Pathologist    Romain Hitchcock, SLP  3/27/2019 10:19 AM

## 2019-03-27 NOTE — CARE COORDINATION
CASE MANAGEMENT DISCHARGE SUMMARY      Discharge to: [facility] [address] completed: [facility] Exemption Notification (HENS) completed: yes      Transportation: [address] up time: 4:30pm    Ambulance form completed: Yes    Notified: patient aware of discharge time   Family: family aware of discharge plan    Facility/Agency: [facility] faxed to [phone]   RN: April aware of discharge plan   Phone number for report to facility: Sulaiman Farley aware of transport time. Note: Discharging nurse to complete RSA, reconcile AVS, and place final copy with patient's discharge packet. RN to ensure that written prescriptions for  Level II medications are sent with patient to the facility as per protocol.

## 2019-03-27 NOTE — PROGRESS NOTES
Hospitalist Progress Note      PCP: Rahul Modi MD    Date of Admission: 3/21/2019    Chief Complaint: confusion    Hospital Course:   82 y.o. female who presented to [facility] with above complaints. Patient presented to the ED via EMS for altered mental status. Patient currently confused and unable to provide any history for me. So all the history was obtained from the ER staff. Patient was apparently seen normal 2 days ago, and was last spoken to yesterday night by family. OT who went to her house today, the patient's son and she did not answer the door. The patient's son checked on her at around 5:30 PM today, the patient was apparently confused and he called EMS to come pick her up. EMS reported right sided weakness, but pt did not have focal weakness in ER. It'll status was apparently waxing and waning in the ER too where she was having periods of lucidity. Subjective: EMR and notes reviewed pt seen and examined, significant improvement, in affect and function today.  Pt has nom complaints     Medications:  Reviewed    Infusion Medications    dextrose       Scheduled Medications    enoxaparin  40 mg Subcutaneous Daily    aspirin  81 mg Oral Daily    cefdinir  300 mg Oral 2 times per day    amLODIPine  10 mg Oral Daily    insulin glargine  5 Units Subcutaneous Nightly    atorvastatin  40 mg Oral Daily    metoprolol tartrate  50 mg Oral BID    oxybutynin  2.5 mg Oral BID    insulin lispro  0-6 Units Subcutaneous TID WC    insulin lispro  0-3 Units Subcutaneous Nightly    sodium chloride flush  10 mL Intravenous 2 times per day     PRN Meds: melatonin, glucose, dextrose, glucagon (rDNA), dextrose, sodium chloride flush, magnesium hydroxide, ondansetron      Intake/Output Summary (Last 24 hours) at 3/27/2019 0831  Last data filed at 3/26/2019 2028  Gross per 24 hour   Intake 300 ml   Output 0 ml   Net 300 ml       Physical Exam Performed:    BP (!) 160/68   Pulse 73   Temp 98.6 °F (37 °C) (Oral)   Resp 16   Ht 5' 5\" (1.651 m)   Wt 135 lb 1.6 oz (61.3 kg)   SpO2 95%   BMI 22.48 kg/m²     General appearance:  No apparent distress, appears stated age, lethargic   HEENT:  Normal cephalic, atraumatic without obvious deformity. Pupils equal, round, and reactive to light. Extra ocular muscles intact. Conjunctivae/corneas clear. Neck: Supple, with full range of motion. No jugular venous distention. Trachea midline. Respiratory:  Normal respiratory effort. Clear to auscultation, bilaterally without Rales/Wheezes/Rhonchi. Cardiovascular:  Regular rate and rhythm with normal S1/S2 without murmurs, rubs or gallops. Abdomen: Soft, non-tender, non-distended with normal bowel sounds. Musculoskeletal:  No clubbing, cyanosis or edema bilaterally. Full range of motion without deformity. Skin: Skin color, texture, turgor normal.  No rashes or lesions. Neurologic: Lethargic, speech is clear by slow. Place notes as [place], unable to answer year or president she closed her eyes in eversion. Follow simple commands moves all extremities equal time X 4, no drift face appears symmetrical and tongue is ML    Psychiatric:  Flat affect, quite. Possible hx of dementia, family felt she was depressed at home   Capillary Refill: Brisk,< 3 seconds   Peripheral Pulses: +2 palpable, equal bilaterally     Labs:   Recent Labs     03/26/19  0943   WBC 10.8   HGB 12.9   HCT 39.4        Recent Labs     03/26/19  0943      K 3.7   CL 96*   CO2 28   BUN 12   CREATININE 1.1   CALCIUM 8.9     No results for input(s): AST, ALT, BILIDIR, BILITOT, ALKPHOS in the last 72 hours. No results for input(s): INR in the last 72 hours. No results for input(s): Hannah Jubilee in the last 72 hours.     Urinalysis:      Lab Results   Component Value Date    NITRU POSITIVE 03/21/2019    WBCUA >100 03/21/2019    BACTERIA 4+ 03/21/2019    RBCUA see below 03/21/2019    BLOODU SMALL 03/21/2019    SPECGRAV 1.015 03/21/2019    GLUCOSEU Negative 03/21/2019    GLUCOSEU NEGATIVE 04/01/2011       Radiology:  MRI BRAIN WO CONTRAST   Final Result   No acute intracranial abnormality. There is mild-moderate white matter   disease. Disproportionate enlargement of the ventricles unchanged from February 2019. 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 CT HEAD WO CONTRAST   Final Result   No acute intracranial abnormality. XR CHEST PORTABLE   Final Result   No acute abnormality. Overall clear appearing lungs. 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 Neurology evaluated.     - continue asa and statin   - PT/OT and SLP     - Neuro checks and safety   - Reviewed psych and neuro notes        UTI (urinary tract infection) - iv rocephin started, UC with Enterococcus Faecalis   - change ABX to PO omnicef for 7 day course      Essential hypertension - uncontrolled on arrival, resume home medication and added norvasc.     Type 2 diabetes mellitus - last A1C 8.4, held metformin while IP, started on SSI with Basal bolus  with Accu-Cheks     Mixed hyperlipidemia - resume statin     Suspect underlying depression/dementia- pt was very quite and did not contribute much to conversation. Son at bedside feels she does not eat much at home and doesn't care for self as she should. - start Remeron  But had stop 2/2 to above   - Family asked for Psych eval. Request address competency and possible recommendation for medications if warranted- appreciate assistance     Goals and plans: PT has not been caring for her self at home or been reliable taling medication using medi alert device, tend to not answer door when Joel Campuzano comes to visit. Unable to provide 24  Hour supervision uncertain she would be willing able to call for help if needed or take meal reliably.   - PT/OT rec's for SNF   - CM made aware  - PT and family agreeable to SNF for rehab however pt was not     DVT Prophylaxis: lovenox  Diet: DIET CARB CONTROL; Dietary Nutrition Supplements: Frozen Oral Supplement  Code Status: Full Code    PT/OT Eval Status: ordered and seeing.  Rec's reviewed     Dispo - To SNF when bed available   Clinton Rubin, APRN - CNP

## 2019-03-27 NOTE — DISCHARGE SUMMARY
Hospital Medicine Discharge Summary    Patient ID: Tere Rodriguez      Patient's PCP: Cory Ku MD    Admit Date: 3/21/2019     Discharge Date:   3/27/19    Admitting Physician: Chito Lambert MD     Discharge Physician: REI Swanson - CNP     Discharge Diagnoses: Active Hospital Problems    Diagnosis Date Noted    DM (diabetes mellitus), secondary, uncontrolled, w/neurologic complic ([facility] [facility] [V73.07, W51.59] 04/13/2011     Priority: High    HTN (hypertension), benign [I10]      Priority: Medium    Vascular dementia with behavior disturbance [F01.51] 03/26/2019    Delirious [R41.0]     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 Hospital Course:   82 y.o. female who [facility] to [facility] with above complaints. Patient presented to the ED via EMS for altered mental status.  Patient currently confused and unable to provide any history for me.  So all the history was obtained from the ER staff.  Patient was apparently seen normal 2 days ago, and was last spoken to yesterday night by family. Josie Longo went to her house today, the patient's son and she did not answer the door.  The patient's son checked on her at around 5:30 PM today, the patient was apparently confused and he called EMS to come pick her up.  EMS reported right sided weakness, but pt did not have focal weakness in ER.  It'll status was apparently waxing and waning in the ER too where she was having periods of lucidity.       Acute metabolic encephalopathy in the setting of dementia - 2/2 to UTI know likely with hospital induced delirium/psychosis   - MRI was obtained R/O acute CVA however did demonstate enlarged ventricles. Neurology evaluated. - continue asa and statin   - PT/OT and SLP     - Neuro checks and safety   - Reviewed psych and neuro notes         UTI (urinary tract infection) - iv rocephin started, UC with Enterococcus Faecalis   - change ABX to PO omnicef for 7 day course      Essential hypertension - uncontrolled on arrival, resume home medication and added norvasc.     Type 2 diabetes mellitus - last A1C 8.4, held metformin while IP, started on SSI with Basal bolus  with Accu-Cheks     Mixed hyperlipidemia - resume statin      Suspect underlying depression/dementia- pt was very quite and did not contribute much to conversation. Son at bedside feels she does not eat much at home and doesn't care for self as she should. - start Remeron  But had stop 2/2 to somnolence  - Family asked for Psych eval. Request address competency and possible recommendation for medications if warranted- appreciate assistance      Goals and plans: PT has not been caring for her self at home or been reliable taling medication using medi alert device, tend to not answer door when Joel Campuzano comes to visit. Unable to provide 24  Hour supervision uncertain she would be willing able to call for help if needed or take meal reliably.   - PT/OT rec's for SNF   - CM made aware  - PT and family agreeable to SNF for rehab however pt was not                Physical Exam Performed:     BP (!) 101/57   Pulse 77   Temp 97.8 °F (36.6 °C) (Oral)   Resp 16   Ht 5' 5\" (1.651 m)   Wt 135 lb 1.6 oz (61.3 kg)   SpO2 94%   BMI 22.48 kg/m²       General appearance:  No apparent distress, appears stated age and cooperative. HEENT:  Normal cephalic, atraumatic without obvious deformity. Pupils equal, round, and reactive to light. Extra ocular muscles intact. Conjunctivae/corneas clear.   Neck: Supple, with full range of motion. No jugular venous distention. Trachea midline. Respiratory:  Normal respiratory effort. Clear to auscultation, bilaterally without Rales/Wheezes/Rhonchi. Cardiovascular:  Regular rate and rhythm with normal S1/S2 without murmurs, rubs or gallops. Abdomen: Soft, non-tender, non-distended with normal bowel sounds. Musculoskeletal:  No clubbing, cyanosis or edema bilaterally. Full range of motion without deformity. Skin: Skin color, texture, turgor normal.  No rashes or lesions. Neurologic:  Neurovascularly intact without any focal sensory/motor deficits. Cranial nerves: II-XII intact, grossly non-focal.  Psychiatric:  Alert and oriented, thought content appropriate, normal insight  Capillary Refill: Brisk,< 3 seconds   Peripheral Pulses: +2 palpable, equal bilaterally       Labs: For convenience and continuity at follow-up the following most recent labs are provided:      CBC:    Lab Results   Component Value Date    WBC 10.8 03/26/2019    HGB 12.9 03/26/2019    HCT 39.4 03/26/2019     03/26/2019       Renal:    Lab Results   Component Value Date     03/26/2019    K 3.7 03/26/2019    CL 96 03/26/2019    CO2 28 03/26/2019    BUN 12 03/26/2019    CREATININE 1.1 03/26/2019    CALCIUM 8.9 03/26/2019         Significant Diagnostic Studies    Radiology:   MRI BRAIN WO CONTRAST   Final Result   No acute intracranial abnormality. There is mild-moderate white matter   disease. Disproportionate enlargement of the ventricles unchanged from February 2019. 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 CT HEAD WO CONTRAST   Final Result   No acute intracranial abnormality. XR CHEST PORTABLE   Final Result   No acute abnormality. Overall clear appearing lungs.                 Consults:     IP CONSULT TO HOSPITALIST  IP CONSULT TO PSYCHIATRY  IP CONSULT TO NEUROLOGY    Disposition:  Skilled nursing facility    Condition at Discharge:

## 2019-03-27 NOTE — PROGRESS NOTES
Bill Cartagena  Neurology Follow-up  [facility] Neurology    Date of Service: 3/27/2019    Subjective:   CC: Follow up today regarding: acute delirium    Events noted. Chart and lab reviewed. She looks better today. She is more awake and alert. She denies any headache, dysphagia or dysarthria or focal weakness. Poor insight. Long discussion with her daughter. TSH normal.  B12 is pending. Other review of system was unremarkable. ROS : A 10-12 system review obtained and updated today and is unremarkable except as mentioned  in my interval history.        Past Medical History:   Diagnosis Date    DJD (degenerative joint disease)     Eczema     Elevated LFTs     HBP (high blood pressure)     Hyperlipidemia     NIDDM (non-insulin dependent diabetes mellitus)     Scoliosis      Current Facility-Administered Medications   Medication Dose Route Frequency Provider Last Rate Last Dose    enoxaparin (LOVENOX) injection 40 mg  40 mg Subcutaneous Daily Dallas Byers MD   40 mg at 03/27/19 2835    aspirin chewable tablet 81 mg  81 mg Oral Daily REI Ordaz CNP   81 mg at 03/27/19 3888    cefdinir (OMNICEF) capsule 300 mg  300 mg Oral 2 times per day REI Ordaz CNP   300 mg at 03/27/19 0900    amLODIPine (NORVASC) tablet 10 mg  10 mg Oral Daily Digna Azar MD   10 mg at 03/27/19 0820    insulin glargine (LANTUS) injection vial 5 Units  5 Units Subcutaneous Nightly Digna Azar MD   5 Units at 03/26/19 2147    atorvastatin (LIPITOR) tablet 40 mg  40 mg Oral Daily Evan Sands MD   40 mg at 03/27/19 0820    melatonin tablet 3 mg  3 mg Oral Nightly PRN Evan Sands MD        metoprolol tartrate (LOPRESSOR) tablet 50 mg  50 mg Oral BID Evan Sands MD   50 mg at 03/27/19 0819    oxybutynin (DITROPAN) tablet 2.5 mg  2.5 mg Oral BID Evan Sands MD   2.5 mg at 03/27/19 0820    glucose (GLUTOSE) 40 % oral gel 15 g  15 g Oral PRN Nahid Mcclure MD        dextrose 50 % solution 12.5 g  12.5 g Intravenous PRN Nahid Mcclure MD        glucagon (rDNA) injection 1 mg  1 mg Intramuscular PRN Nahid Mcclure MD        dextrose 5 % solution  100 mL/hr Intravenous PRN Nahid Mcclure MD        insulin lispro (HUMALOG) injection vial 0-6 Units  0-6 Units Subcutaneous TID WC Nahid Mcclure MD   Stopped at 03/27/19 0046    insulin lispro (HUMALOG) injection vial 0-3 Units  0-3 Units Subcutaneous Nightly Nahid Mcclure MD   2 Units at 03/26/19 2148    sodium chloride flush 0.9 % injection 10 mL  10 mL Intravenous 2 times per day Nahid Mcclure MD   10 mL at 03/27/19 0918    sodium chloride flush 0.9 % injection 10 mL  10 mL Intravenous PRN Nahid Mcclure MD        magnesium hydroxide (MILK OF MAGNESIA) 400 MG/5ML suspension 30 mL  30 mL Oral Daily PRN Nahid Mcclure MD        ondansetron [facility]) injection 4 mg  4 mg Intravenous Q6H PRN Nahid Mcclrue MD         Allergies   Allergen Reactions    Metformin And Related Other (See Comments)     diarrhea     Darvocet [Propoxyphene N-Acetaminophen] Nausea And Vomiting    Propoxyphene Nausea And Vomiting     family history includes Cancer in her mother and sister; Heart Disease in her father. reports that she has never smoked. She has never used smokeless tobacco. She reports that she drinks alcohol. She reports that she does not use drugs. Objective:  Exam:   Constitutional:   Vitals:    03/26/19 2319 03/27/19 0445 03/27/19 0815 03/27/19 1117   BP: 135/65 131/69 (!) 160/68 (!) 101/57   Pulse: 71 78 73 77   Resp: 17 17 16 16   Temp: 98 °F (36.7 °C) 98.2 °F (36.8 °C) 98.6 °F (37 °C) 97.8 °F (36.6 °C)   TempSrc: Oral Oral Oral Oral   SpO2: 94% 95% 95% 94%   Weight:       Height:         General appearance:  Normal development and appear in no acute distress. Eye: No icterus.    Neck: supple  Cardiovascular:  No lower leg edema with good pulsation. Mental Status:   AAO times two  Poor insight and 1/3 immediate recall  Poor remote memory  Good attention today  Fluent speech and intact naming  Poor fund of knowledge   Cranial Nerves:   II: Visual fields: Full. Pupils: equal, round, reactive to light  III,IV,VI: Extra Ocular Movements are intact. No nystagmus  V: Facial sensation is intact  VII: Facial strength and movements: intact and symmetric  IX: Palate elevation is symmetric  XI: Shoulder shrug is intact  XII: Tongue movements are normal  Musculoskeletal: 5/5 in all 4 extremities. Tone: Normal tone. Reflexes: Bilateral biceps 2/4, triceps 2/4, brachial radialis 2/4, knee 1/4 and ankle 1/4. Planters: flexor bilaterally. Coordination: no pronator drift, no dysmetria with FNF. Normal REM. Sensation: normal to all modalities in both arms and legs. Gait/Posture: steady gait with assistance        Data:  LABS:   Lab Results   Component Value Date     03/26/2019    K 3.7 03/26/2019    CL 96 03/26/2019    CO2 28 03/26/2019    BUN 12 03/26/2019    CREATININE 1.1 03/26/2019    GFRAA 58 03/26/2019    GFRAA >60 01/22/2013    LABGLOM 48 03/26/2019    GLUCOSE 187 03/26/2019    MG 1.80 03/27/2019    CALCIUM 8.9 03/26/2019     Lab Results   Component Value Date    WBC 10.8 03/26/2019    RBC 4.69 03/26/2019    HGB 12.9 03/26/2019    HCT 39.4 03/26/2019    MCV 84.1 03/26/2019    RDW 14.6 03/26/2019     03/26/2019     Lab Results   Component Value Date    INR 1.01 03/21/2019    PROTIME 11.5 03/21/2019     Neuroimaging and/or  labs reviewed by me and discussed results with the patient and family. Impression:no change today  Acute delirium superimposed on dementia. The same  Abnormal MRI of the brain likely incidental.  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 Less likely NPH. Chronic cognitive impairment and underlying dementia. Possible AD.   Hypertension, not controlled  Depression  Hospital-acquired delirium  Insomnia  Diabetes, not controlled. Recent A1c 8.4  Hypothyroid  UTI           Recommendation  Continue current supportive care  PT and OT  Speech evaluation  Agree with ECF placement  Continue aspirin  Statin for stroke prevention  Blood sugar monitoring  Insulin sliding scale  Hydration  Continue antibiotics  Continue current BP medications   Long discussion with the patient's family regarding outcome from delirium with underlying dementia. Follow-up with me outpatient with family for further management regarding her chronic dementia  No further recommendation           Bharat Mace MD   [phone]      This dictation was generated by voice recognition computer software. Although all attempts are made to edit the dictation for accuracy, there may be errors in the transcription that are not intended.

## 2019-03-27 NOTE — PROGRESS NOTES
Occupational Therapy  Facility/Department: [facility] B3 - MED SURG  Daily Treatment Note  NAME: Artie Cervantes  : 1935  MRN: [id number]    Date of Service: 3/27/2019    Discharge Recommendations:  Subacute/Skilled Nursing Facility     Assessment   Performance deficits / Impairments: Decreased balance;Decreased functional mobility ; Decreased ADL status; Decreased cognition;Decreased high-level IADLs;Decreased fine motor control;Decreased endurance;Decreased strength;Decreased coordination;Decreased safe awareness  Assessment: Pt would benefit from skilled OT services at SNF to address ADL skills and mobility. Pt requires cues for safety and attention during session. Cont OT. Prognosis: Fair  Patient Education: role of OT, transfers, ADLs, safety   REQUIRES OT FOLLOW UP: Yes  Activity Tolerance  Activity Tolerance: Patient Tolerated treatment well  Safety Devices  Type of devices: Gait belt;Nurse notified(Pt transferred to w/c for daughter to push her around unit. RN approved. )       Patient Diagnosis(es): The primary encounter diagnosis was Acute encephalopathy. Diagnoses of Urinary tract infection without hematuria, site unspecified and Aphasia were also pertinent to this visit. has a past medical history of DJD (degenerative joint disease), Eczema, Elevated LFTs, HBP (high blood pressure), Hyperlipidemia, NIDDM (non-insulin dependent diabetes mellitus), and Scoliosis. has a past surgical history that includes Cholecystectomy; bladder suspension; Uterine Suspension; joint replacement (11); Tonsillectomy and adenoidectomy; Colonoscopy; and Colonoscopy (3/30/16).     Restrictions  Restrictions/Precautions  Restrictions/Precautions: General Precautions, Fall Risk  Position Activity Restriction  Other position/activity restrictions: up as tolerated  Subjective   General  Chart Reviewed: Yes  Patient assessed for rehabilitation services?: Yes  Family / Caregiver Present: Yes(daughter)  Referring Practitioner: Lizabeth Macias  Diagnosis: confusion, acute encephalopathy 2/2 UTI  Subjective  Subjective: Pt seated in chair at OT approach  General Comment  Comments: RN approved therapy  Vital Signs  Patient Currently in Pain: Denies   Orientation  Orientation  Orientation Level: Oriented to person;Disoriented to place; Disoriented to time;Disoriented to situation  Objective    ADL  Feeding: Supervision;Setup; Increased time to complete  LE Dressing: Moderate assistance(brief)  Toileting: Moderate assistance(assist to manage brief; vc's for pericare while seated)     Balance  Sitting Balance: Supervision  Standing Balance: Minimal assistance(CGA/min A RW)  Standing Balance  Time: x3 min total  Sit to stand: Minimal assistance  Stand to sit: Minimal assistance  Functional Mobility  Functional - Mobility Device: Rolling Walker  Activity: To/from bathroom  Assist Level: Minimal assistance  Functional Mobility Comments: vc's for safe navigation of walker, vc's for upright posture  Toilet Transfers  Toilet - Technique: Ambulating(RW)  Equipment Used: Standard toilet  Toilet Transfer: Minimal assistance; Moderate assistance  Bed mobility  Supine to Sit: Unable to assess  Sit to Supine: Unable to assess  Transfers  Stand Pivot Transfers: Minimal assistance(RW)  Sit to stand: Minimal assistance  Stand to sit: Minimal assistance      Cognition  Arousal/Alertness: Inconsistent responses to stimuli  Following Commands: Follows one step commands with increased time; Follows one step commands with repetition  Attention Span: Difficulty dividing attention; Attends with cues to redirect  Memory: Decreased recall of recent events;Decreased short term memory  Safety Judgement: Decreased awareness of need for assistance;Decreased awareness of need for safety  Problem Solving: Assistance required to identify errors made;Assistance required to correct errors made  Insights: Decreased awareness of deficits  Initiation: Requires cues for

## 2019-03-27 NOTE — PROGRESS NOTES
Writer heard alarm on telemetry monitors, noted pt heart rhythm was alarming as vtach/vfib. Writer entered pt room, pt was sitting in chair with no expression on her face, did not seem to be in pain or distress, was rubbing her hands together. Pt did not speak or answer writer but tracked writer with eyes. Ordered copy of strip from [address] [place] [address], ordered MG+, paged cross coverage with update.   Rao Kelly

## 2019-03-27 NOTE — PROGRESS NOTES
Pt d/c'd 3/27/19. IV access removed with no complications. Notified [facility] and removed tele box. Reviewed d/c instructions, home meds, and f/u information utilizing teach-back method. Scripts for aspirin, norvasc, omnicef. Patient verbalized understanding. Patient discharged in ambulance and left with all documented belongings. Report called to [facility] [phone].

## 2019-03-27 NOTE — CARE COORDINATION
nHpredict outcome report received and discussed with  Annabelle Lynn RN. Reviewed nHpredict outcome report with patient/caregiver. Provided patient/caregiver with copy of report. Addressed all questions/concerns.

## 2019-03-27 NOTE — CONSULTS
Psychiatric Consult See recommendations below)  Met with daughter today to discuss care. To dictate consult  Apparently Cece Becerra had been driving but family have seen a deterioration in memory. Family is concerned that she has taken a dramatic shift with mood lability with irritability and then appearing withdrawn.     MRI  was negative for stroke but has white matter disease and enlarged ventricles.      Cece Becerra would not communicate with me today .      Daughter is concerned that we are missing something with her care .     Would recommend that Neurology be involved to help explain MRI and neurological changes(family is requesting this.)     Added Seroquel 25 mg HS prn insomnia                      Admit Date:  3/21/2019    Consult Date:  3/26/2019   Id Info: 83 yo   Reason for Consult: confusion and delirium  Summary Present Illness: Per Dr. Lidia Jang  The patient is a 82y.o.  years old female with history of hypertension, diabetes, hyperlipidemia and chronic cognitive impairment who was initially admitted to [facility] few days ago with acute confusion and mental status changes. The patient was recently discharged from rehab. She stayed home for 12 days and then came back for another episode of confusion at home. Family described more confusion to the point that she wasn't able to care for herself. No triggers or other associated symptoms. No witnessed seizure, convulsions, tongue biting or bladder incontinence. No head trauma or recent syncope. Degree was severe. Duration was waxing and waning. The patient was admitted to the hospital.  She was diagnosed with multifactorial metabolic encephalopathy and UTI. She was treated with antibiotics. Neurology has been consulted for worsening encephalopathy and MRI results. The patient had MRI of the brain today which showed diffuse atrophy and hydrocephalus. Patient is currently lethargic. She received Remeron last night because of sleep deprivation.   She used to live by herself. Family reports gradual decline of her memory over the last year. No history of psychosis or hallucination. No family history of dementia. Denies any recurrent falling at home or bladder incontinence. History of hypertension. Blood pressure has been waxing and waning in the hospital.  No prior hx of stroke.  Other ROS was limited.          Psychiatric Hx: Hosp: none   Tx: none  Abuse History: no alcohol use, no physical or sexual abuse or perpetration, per family  Social Hx: family Marital Status: single, education: [employer], financial LAUREEN, legal none known   Examination:  Level of consciousness:  somnolent  Appearance:  lying in bed thin/frail  Behavior/Motor:  no abnormalities noted LAUREEN  Attitude toward examiner:  asleep  Speech:  LAUREEN  Mood:  asleep  Affect:  asleep  Hallucinations: LAUREEN  Thought processes:  LAUREEN Attention:  LAUREEN  Thought content:  UTAUTA Abstraction: LAUREEN  OCD: LAUREEN  Insight: LAUREEN  Judgement: [address] of Knowledge: UTAIQ:LAUREEN Memory: LAUREEN  Cognition:  LAUREEN  Suicide:  LAUREEN  Sleep: asleep  Appetite: LAUREEN  Inventory of strengths and weaknesses:Family support  PE: VITALS:  /78   Pulse 78   Temp 98.4 °F (36.9 °C) (Oral)   Resp 16   Ht 5' 5\" (1.651 m)   Wt 135 lb 1.6 oz (61.3 kg)   SpO2 94%   BMI 22.48 kg/m²     Cranial Nerves: 1-12 appear to be intact unable to assess  ROS:  Reviewed ED and Med notes and agree with findings  Labs:    Admission on 03/21/2019   Component Date Value Ref Range Status    WBC 03/21/2019 7.8  4.0 - 11.0 K/uL Final    RBC 03/21/2019 4.55  4.00 - 5.20 M/uL Final    Hemoglobin 03/21/2019 12.6  12.0 - 16.0 g/dL Final    Hematocrit 03/21/2019 38.4  36.0 - 48.0 % Final    MCV 03/21/2019 84.3  80.0 - 100.0 fL Final    MCH 03/21/2019 27.8  26.0 - 34.0 pg Final    MCHC 03/21/2019 32.9  31.0 - 36.0 g/dL Final    RDW 03/21/2019 14.3  12.4 - 15.4 % Final    Platelets 76/63/3226 251  135 - 450 K/uL Final    MPV 03/21/2019 10.1  5.0 - 10.5 fL Final  Neutrophils % 03/21/2019 69.9  % Final    Lymphocytes % 03/21/2019 15.9  % Final    Monocytes % 03/21/2019 9.2  % Final    Eosinophils % 03/21/2019 4.3  % Final    Basophils % 03/21/2019 0.7  % Final    Neutrophils # 03/21/2019 5.5  1.7 - 7.7 K/uL Final    Lymphocytes # 03/21/2019 1.2  1.0 - 5.1 K/uL Final    Monocytes # 03/21/2019 0.7  0.0 - 1.3 K/uL Final    Eosinophils # 03/21/2019 0.3  0.0 - 0.6 K/uL Final    Basophils # 03/21/2019 0.1  0.0 - 0.2 K/uL Final    Sodium 03/21/2019 136  136 - 145 mmol/L Final    Potassium 03/21/2019 4.7  3.5 - 5.1 mmol/L Final    Chloride 03/21/2019 97* 99 - 110 mmol/L Final    CO2 03/21/2019 28  21 - 32 mmol/L Final    Anion Gap 03/21/2019 11  3 - 16 Final    Glucose 03/21/2019 208* 70 - 99 mg/dL Final    BUN 03/21/2019 30* 7 - 20 mg/dL Final    CREATININE 03/21/2019 1.2  0.6 - 1.2 mg/dL Final    GFR Non- 03/21/2019 43* >60 Final    Comment: >60 mL/min/1.73m2 EGFR, calc. for ages 25 and older using the  MDRD formula (not corrected for weight), is valid for stable  renal function.  GFR  03/21/2019 52* >60 Final    Comment: Chronic Kidney Disease: less than 60 ml/min/1.73 sq.m. Kidney Failure: less than 15 ml/min/1.73 sq.m. Results valid for patients 18 years and older.  Calcium 03/21/2019 9.4  8.3 - 10.6 mg/dL Final    Total Protein 03/21/2019 8.0  6.4 - 8.2 g/dL Final    Alb 03/21/2019 4.1  3.4 - 5.0 g/dL Final    Albumin/Globulin Ratio 03/21/2019 1.1  1.1 - 2.2 Final    Total Bilirubin 03/21/2019 0.8  0.0 - 1.0 mg/dL Final    Alkaline Phosphatase 03/21/2019 101  40 - 129 U/L Final    ALT 03/21/2019 24  10 - 40 U/L Final    AST 03/21/2019 28  15 - 37 U/L Final    Globulin 03/21/2019 3.9  g/dL Final    Blood Culture, Routine 03/21/2019 No Growth to date. Any change in status will be called. Preliminary    Culture, Blood 2 03/21/2019 No Growth to date. Any change in status will be called. Preliminary    Color, UA 03/21/2019 Straw  Straw/Yellow Final    Clarity, UA 03/21/2019 CLOUDY* Clear Final    Glucose, Ur 03/21/2019 Negative  Negative mg/dL Final    Bilirubin Urine 03/21/2019 Negative  Negative Final    Ketones, Urine 03/21/2019 Negative  Negative mg/dL Final    Specific Gravity, UA 03/21/2019 1.015  1.005 - 1.030 Final    Blood, Urine 03/21/2019 SMALL* Negative Final    pH, UA 03/21/2019 6.5  5.0 - 8.0 Final    Protein, UA 03/21/2019 100* Negative mg/dL Final    Urobilinogen, Urine 03/21/2019 0.2  <2.0 E.U./dL Final    Nitrite, Urine 03/21/2019 POSITIVE* Negative Final    Leukocyte Esterase, Urine 03/21/2019 LARGE* Negative Final    Microscopic Examination 03/21/2019 YES   Final    Urine Reflex to Culture 03/21/2019 Yes   Final    Urine Type 03/21/2019 Not Specified   Final    Lactic Acid 03/21/2019 0.9  0.4 - 2.0 mmol/L Final    Protime 03/21/2019 11.5  9.8 - 13.0 sec Final    Comment: Effective 5-31-18 09:00am EST  Please note reference ranges have  changed for PT and INR Testing.  INR 03/21/2019 1.01  0.86 - 1.14 Final    Comment: Effective 05/31/18 at 09:00am EST    Normal: 0.86 - 1.14  Therapeutic: 2.0 - 3.0  Pros.  Valve: 2.5 - 3.5  AMI: 2.0 - 3.0      Ventricular Rate 03/21/2019 69  BPM Final    Atrial Rate 03/21/2019 69  BPM Final    P-R Interval 03/21/2019 148  ms Final    QRS Duration 03/21/2019 74  ms Final    Q-T Interval 03/21/2019 422  ms Final    QTc Calculation (Bazett) 03/21/2019 452  ms Final    P Axis 03/21/2019 55  degrees Final    R Axis 03/21/2019 -7  degrees Final    T Axis 03/21/2019 76  degrees Final    Diagnosis 03/21/2019 Baseline artifactNormal sinus rhythmNormal ECGConfirmed by Aquiles Bejarano MD, [facility] ([id number]) on 3/22/2019 8:05:10 AM   Final    Organism 03/21/2019 Enterococcus faecalis*  Final    Urine Culture, Routine 03/21/2019 >100,000 CFU/ml   Final    WBC, UA 03/21/2019 >100* 0 - 5 /HPF Final    RBC, UA 03/21/2019 see below  0 - 2 /HPF Final    Present but obscured.  Epi Cells 03/21/2019 5-10  /HPF Final    Bacteria, UA 03/21/2019 4+* /HPF Final    Sodium 03/22/2019 141  136 - 145 mmol/L Final    Potassium reflex Magnesium 03/22/2019 4.4  3.5 - 5.1 mmol/L Final    Chloride 03/22/2019 103  99 - 110 mmol/L Final    CO2 03/22/2019 27  21 - 32 mmol/L Final    Anion Gap 03/22/2019 11  3 - 16 Final    Glucose 03/22/2019 181* 70 - 99 mg/dL Final    BUN 03/22/2019 23* 7 - 20 mg/dL Final    CREATININE 03/22/2019 1.1  0.6 - 1.2 mg/dL Final    GFR Non- 03/22/2019 48* >60 Final    Comment: >60 mL/min/1.73m2 EGFR, calc. for ages 25 and older using the  MDRD formula (not corrected for weight), is valid for stable  renal function.  GFR  03/22/2019 58* >60 Final    Comment: Chronic Kidney Disease: less than 60 ml/min/1.73 sq.m. Kidney Failure: less than 15 ml/min/1.73 sq.m. Results valid for patients 18 years and older.       Calcium 03/22/2019 8.9  8.3 - 10.6 mg/dL Final    POC Glucose 03/22/2019 208* 70 - 99 mg/dl Final    Performed on 03/22/2019 ACCU-CHEK   Final    POC Glucose 03/22/2019 183* 70 - 99 mg/dl Final    Performed on 03/22/2019 ACCU-CHEK   Final    POC Glucose 03/22/2019 196* 70 - 99 mg/dl Final    Performed on 03/22/2019 ACCU-CHEK   Final    POC Glucose 03/22/2019 170* 70 - 99 mg/dl Final    Performed on 03/22/2019 ACCU-CHEK   Final    POC Glucose 03/22/2019 240* 70 - 99 mg/dl Final    Performed on 03/22/2019 ACCU-CHEK   Final    POC Glucose 03/23/2019 170* 70 - 99 mg/dl Final    Performed on 03/23/2019 ACCU-CHEK   Final    POC Glucose 03/23/2019 254* 70 - 99 mg/dl Final    Performed on 03/23/2019 ACCU-CHEK   Final    POC Glucose 03/23/2019 222* 70 - 99 mg/dl Final    Performed on 03/23/2019 ACCU-CHEK   Final    POC Glucose 03/23/2019 194* 70 - 99 mg/dl Final    Performed on 03/23/2019 ACCU-CHEK   Final    POC Glucose 03/24/2019 175* 70 - 99 mg/dl Final    Performed on 03/24/2019 ACCU-CHEK   Final    POC Glucose 03/24/2019 211* 70 - 99 mg/dl Final    Performed on 03/24/2019 ACCU-CHEK   Final    POC Glucose 03/24/2019 144* 70 - 99 mg/dl Final    Performed on 03/24/2019 ACCU-CHEK   Final    POC Glucose 03/24/2019 275* 70 - 99 mg/dl Final    Performed on 03/24/2019 ACCU-CHEK   Final    POC Glucose 03/25/2019 162* 70 - 99 mg/dl Final    Performed on 03/25/2019 ACCU-CHEK   Final    POC Glucose 03/25/2019 196* 70 - 99 mg/dl Final    Performed on 03/25/2019 ACCU-CHEK   Final    POC Glucose 03/25/2019 256* 70 - 99 mg/dl Final    Performed on 03/25/2019 ACCU-CHEK   Final    POC Glucose 03/25/2019 208* 70 - 99 mg/dl Final    Performed on 03/25/2019 ACCU-CHEK   Final    POC Glucose 03/25/2019 121* 70 - 99 mg/dl Final    Performed on 03/25/2019 ACCU-CHEK   Final    POC Glucose 03/26/2019 163* 70 - 99 mg/dl Final    Performed on 03/26/2019 ACCU-CHEK   Final    WBC 03/26/2019 10.8  4.0 - 11.0 K/uL Final    RBC 03/26/2019 4.69  4.00 - 5.20 M/uL Final    Hemoglobin 03/26/2019 12.9  12.0 - 16.0 g/dL Final    Hematocrit 03/26/2019 39.4  36.0 - 48.0 % Final    MCV 03/26/2019 84.1  80.0 - 100.0 fL Final    MCH 03/26/2019 27.5  26.0 - 34.0 pg Final    MCHC 03/26/2019 32.7  31.0 - 36.0 g/dL Final    RDW 03/26/2019 14.6  12.4 - 15.4 % Final    Platelets 00/94/4989 253  135 - 450 K/uL Final    MPV 03/26/2019 10.1  5.0 - 10.5 fL Final    Sodium 03/26/2019 138  136 - 145 mmol/L Final    Potassium reflex Magnesium 03/26/2019 3.7  3.5 - 5.1 mmol/L Final    Chloride 03/26/2019 96* 99 - 110 mmol/L Final    CO2 03/26/2019 28  21 - 32 mmol/L Final    Anion Gap 03/26/2019 14  3 - 16 Final    Glucose 03/26/2019 187* 70 - 99 mg/dL Final    BUN 03/26/2019 12  7 - 20 mg/dL Final    CREATININE 03/26/2019 1.1  0.6 - 1.2 mg/dL Final    GFR Non- 03/26/2019 48* >60 Final    Comment: >60 mL/min/1.73m2 EGFR, calc. for ages 25 and older using the  MDRD formula (not corrected for weight), is valid for stable  renal function.  GFR  03/26/2019 58* >60 Final    Comment: Chronic Kidney Disease: less than 60 ml/min/1.73 sq.m. Kidney Failure: less than 15 ml/min/1.73 sq.m. Results valid for patients 18 years and older.       Calcium 03/26/2019 8.9  8.3 - 10.6 mg/dL Final        Impression: 83 yo with dementia and recent worsening confusion, who appears delirious at this time  Dx: axis I: Delirium                   Dementia   Axis 2: No diagnosis  Kamuela 3: See Medical History  Axis 4: Other psychosocial and environmental problems  Axis 5: 31-40 impairment in reality testing         Active Hospital Problems    Diagnosis Date Noted    DM (diabetes mellitus), secondary, uncontrolled, [facility] [E65.54, X48.22] 04/13/2011     Priority: High    HTN (hypertension), benign [I10]      Priority: Medium    Vascular dementia with behavior disturbance [F01.51] 03/26/2019    Delirious [R41.0]     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

## 2019-04-15 ENCOUNTER — TELEPHONE (OUTPATIENT)
Dept: INTERNAL MEDICINE CLINIC | Age: 84
End: 2019-04-15

## 2019-05-03 ENCOUNTER — TELEPHONE (OUTPATIENT)
Dept: INTERNAL MEDICINE CLINIC | Age: 84
End: 2019-05-03

## 2019-05-03 NOTE — TELEPHONE ENCOUNTER
[facility] home care will need to ee patient for OT, PT (2x week for 4 weeks)  and Skilled nursing     Call Margarito Aguirre back with verbal to follow   [phone]

## 2019-05-06 ENCOUNTER — CARE COORDINATION (OUTPATIENT)
Dept: CASE MANAGEMENT | Age: 84
End: 2019-05-06

## 2019-05-08 ENCOUNTER — OFFICE VISIT (OUTPATIENT)
Dept: INTERNAL MEDICINE CLINIC | Age: 84
End: 2019-05-08
Payer: MEDICARE

## 2019-05-08 VITALS
BODY MASS INDEX: 22.34 KG/M2 | SYSTOLIC BLOOD PRESSURE: 132 MMHG | HEIGHT: 66 IN | DIASTOLIC BLOOD PRESSURE: 60 MMHG | WEIGHT: 139 LBS

## 2019-05-08 DIAGNOSIS — Z09 HOSPITAL DISCHARGE FOLLOW-UP: ICD-10-CM

## 2019-05-08 DIAGNOSIS — Z91.81 AT HIGH RISK FOR FALLS: ICD-10-CM

## 2019-05-08 DIAGNOSIS — R35.0 FREQUENCY OF URINATION: ICD-10-CM

## 2019-05-08 DIAGNOSIS — N32.81 OAB (OVERACTIVE BLADDER): ICD-10-CM

## 2019-05-08 PROCEDURE — G8420 CALC BMI NORM PARAMETERS: HCPCS | Performed by: NURSE PRACTITIONER

## 2019-05-08 PROCEDURE — 1090F PRES/ABSN URINE INCON ASSESS: CPT | Performed by: NURSE PRACTITIONER

## 2019-05-08 PROCEDURE — 1036F TOBACCO NON-USER: CPT | Performed by: NURSE PRACTITIONER

## 2019-05-08 PROCEDURE — G8427 DOCREV CUR MEDS BY ELIG CLIN: HCPCS | Performed by: NURSE PRACTITIONER

## 2019-05-08 PROCEDURE — 4040F PNEUMOC VAC/ADMIN/RCVD: CPT | Performed by: NURSE PRACTITIONER

## 2019-05-08 PROCEDURE — 99214 OFFICE O/P EST MOD 30 MIN: CPT | Performed by: NURSE PRACTITIONER

## 2019-05-08 PROCEDURE — G8400 PT W/DXA NO RESULTS DOC: HCPCS | Performed by: NURSE PRACTITIONER

## 2019-05-08 PROCEDURE — 1123F ACP DISCUSS/DSCN MKR DOCD: CPT | Performed by: NURSE PRACTITIONER

## 2019-05-08 RX ORDER — GLIPIZIDE 5 MG/1
TABLET, FILM COATED, EXTENDED RELEASE ORAL
COMMUNITY
Start: 2019-05-01 | End: 2019-05-08 | Stop reason: SDUPTHER

## 2019-05-08 RX ORDER — ATORVASTATIN CALCIUM 40 MG/1
TABLET, FILM COATED ORAL
Qty: 90 TABLET | Refills: 3 | Status: ON HOLD | OUTPATIENT
Start: 2019-05-08 | End: 2020-01-07 | Stop reason: ALTCHOICE

## 2019-05-08 RX ORDER — METOPROLOL TARTRATE 50 MG/1
50 TABLET, FILM COATED ORAL 2 TIMES DAILY
Qty: 180 TABLET | Refills: 3 | Status: ON HOLD | OUTPATIENT
Start: 2019-05-08 | End: 2019-08-24 | Stop reason: HOSPADM

## 2019-05-08 RX ORDER — GLIPIZIDE 5 MG/1
5 TABLET, FILM COATED, EXTENDED RELEASE ORAL DAILY
Qty: 90 TABLET | Refills: 3 | Status: SHIPPED | OUTPATIENT
Start: 2019-05-08 | End: 2019-05-08 | Stop reason: ALTCHOICE

## 2019-05-08 RX ORDER — OXYBUTYNIN CHLORIDE 5 MG/1
TABLET ORAL
Qty: 180 TABLET | Refills: 3 | Status: ON HOLD | OUTPATIENT
Start: 2019-05-08 | End: 2020-01-07 | Stop reason: ALTCHOICE

## 2019-05-08 ASSESSMENT — ENCOUNTER SYMPTOMS
VOMITING: 0
NAUSEA: 0
CONSTIPATION: 0
SHORTNESS OF BREATH: 0
BLOOD IN STOOL: 0
DIARRHEA: 0
COUGH: 0
EYE DISCHARGE: 0
WHEEZING: 0
ABDOMINAL PAIN: 0

## 2019-05-08 NOTE — PROGRESS NOTES
05/08/19    Ray Heath  82 y.o.  female    Chief Complaint   Patient presents with    Check-Up       [facility] Up From [facility]         HPI:     Pt presents for follow up on discharge from [facility]. In Feb of this year, lift chair malfunctioned and patient ended up on the floor for two days. She as in the hospital, then to [facility] [place], back to the hospital and rehab at [facility]. She is now back to her condo and family is very involved with oversight. Daughter is filling medi set. They have hired help for 6 hours a day.   /60 (Site: Right Upper Arm, Position: Sitting, Cuff Size: Medium Adult)   Ht 5' 5.5\" (1.664 m)   Wt 139 lb (63 kg)   BMI 22.78 kg/m²        Current Outpatient Medications   Medication Sig Dispense Refill    aspirin 81 MG chewable tablet Take 1 tablet by mouth daily 30 tablet 3    amLODIPine (NORVASC) 10 MG tablet Take 1 tablet by mouth daily 30 tablet 3    oxybutynin (DITROPAN) 5 MG tablet TAKE ONE TABLET BY MOUTH Two TIMES A  tablet 0    Lift Chair MISC by Does not apply route Power left chair 1 each 0    metFORMIN (GLUCOPHAGE-XR) 500 MG extended release tablet TAKE ONE TABLET BY MOUTH DAILY WITH BREAKFAST 30 tablet 2    metoprolol tartrate (LOPRESSOR) 50 MG tablet Take 1 tablet by mouth 2 times daily 60 tablet 0    atorvastatin (LIPITOR) 40 MG tablet TAKE ONE TABLET BY MOUTH DAILY 90 tablet 1    KROGER LANCETS ULTRATHIN 30G MISC USE ONE LANCET TO TEST BLOOD SUGAR LEVELS ONCE DAILY, Dx: E11.9 100 each 3    MICROLET LANCETS MISC 1 each by Does not apply route daily Dx E11.92 100 each 3    Glucos-Chond-Hyal Ac-Ca Fructo (MOVE FREE JOINT HEALTH ADVANCE PO) Take by mouth      Coenzyme Q10 (CO Q 10 PO) Take 1 tablet by mouth daily      KROGER LANCETS ULTRATHIN 30G MISC USE TO TEST BLOOD SUGAR LEVELS ONCE DAILY  Dx E11.9 100 each 3    glipiZIDE (GLUCOTROL XL) 5 MG extended release tablet       melatonin 3 MG TABS tablet Take 1 tablet by mouth nightly as needed (sleep) 30 tablet 2     No current facility-administered medications for this visit. Social History     Socioeconomic History    Marital status:      Spouse name: Not on file    Number of children: Not on file    Years of education: Not on file    Highest education level: Not on file   Occupational History    Not on file   Social Needs    Financial resource strain: Not on file    Food insecurity:     Worry: Not on file     Inability: Not on file    Transportation needs:     Medical: Not on file     Non-medical: Not on file   Tobacco Use    Smoking status: Never Smoker    Smokeless tobacco: Never Used   Substance and Sexual Activity    Alcohol use: Yes     Comment: rarely    Drug use: No    Sexual activity: Never   Lifestyle    Physical activity:     Days per week: Not on file     Minutes per session: Not on file    Stress: Not on file   Relationships    Social connections:     Talks on phone: Not on file     Gets together: Not on file     Attends Muslim service: Not on file     Active member of club or organization: Not on file     Attends meetings of clubs or organizations: Not on file     Relationship status: Not on file    Intimate partner violence:     Fear of current or ex partner: Not on file     Emotionally abused: Not on file     Physically abused: Not on file     Forced sexual activity: Not on file   Other Topics Concern    Not on file   Social History Narrative    Not on file       Family History   Problem Relation Age of Onset    Cancer Mother     Heart Disease Father     Cancer Sister        Past Medical History:   Diagnosis Date    DJD (degenerative joint disease)     Eczema     Elevated LFTs     HBP (high blood pressure)     Hyperlipidemia     NIDDM (non-insulin dependent diabetes mellitus)     Scoliosis        Review of Systems   Constitutional: Negative for fever. HENT: Negative for congestion.     Eyes: Negative for discharge. Respiratory: Negative for cough, shortness of breath and wheezing. Cardiovascular: Negative for chest pain, palpitations and leg swelling. Gastrointestinal: Negative for abdominal pain, blood in stool, constipation, diarrhea, nausea and vomiting. Genitourinary: Negative for dysuria and hematuria. Musculoskeletal: Negative for myalgias. Ambulating with walker for stability   Skin: Negative for rash. Neurological: Negative for dizziness. Psychiatric/Behavioral: The patient is not nervous/anxious. Physical Exam   Constitutional: She is oriented to person, place, and time. No distress. HENT:   Right Ear: External ear normal.   Left Ear: External ear normal.   Mouth/Throat: Oropharynx is clear and moist. No oropharyngeal exudate. Eyes: Right eye exhibits no discharge. Left eye exhibits no discharge. No scleral icterus. Neck: Normal range of motion. No thyromegaly present. Cardiovascular: Normal rate, regular rhythm, normal heart sounds and intact distal pulses. Exam reveals no gallop and no friction rub. No murmur heard. Pulmonary/Chest: Effort normal and breath sounds normal. She has no wheezes. Abdominal: Soft. Bowel sounds are normal. She exhibits no distension. There is no tenderness. Musculoskeletal: Normal range of motion. She exhibits no edema or tenderness. Lymphadenopathy:     She has no cervical adenopathy. Neurological: She is alert and oriented to person, place, and time. Skin: Skin is warm and dry. No rash noted. She is not diaphoretic. No erythema. Psychiatric: Judgment normal.   1. Hospital discharge follow-up  Reviewed recent episodes with patient and her daughter    2. Frequency of urination  Refilled med  - oxybutynin (DITROPAN) 5 MG tablet; TAKE ONE TABLET BY MOUTH Two TIMES A DAY  Dispense: 180 tablet; Refill: 3    3.  OAB (overactive bladder)    - oxybutynin (DITROPAN) 5 MG tablet; TAKE ONE TABLET BY MOUTH Two TIMES A DAY  Dispense: 180 tablet; Refill: 3    4. At high risk for falls  Continue to use walker for stability    5. DM (diabetes mellitus), secondary, uncontrolled, w/neurologic complic (HCC)  Invokana 252 mg daily  DC glipizide  Pt unable to tolerate metformin    Return to see Dr. Isra Arana in one month as scheduled       REI Ornelas CNP    On the basis of positive falls risk screening, assessment and plan is as follows: patient declines any further evaluation/treatment for increased falls risk.

## 2019-05-16 DIAGNOSIS — I10 ESSENTIAL HYPERTENSION: ICD-10-CM

## 2019-05-16 RX ORDER — GLIMEPIRIDE 4 MG/1
TABLET ORAL
Qty: 90 TABLET | Refills: 2 | OUTPATIENT
Start: 2019-05-16

## 2019-05-16 RX ORDER — LISINOPRIL 20 MG/1
TABLET ORAL
Qty: 180 TABLET | Refills: 1 | Status: ON HOLD | OUTPATIENT
Start: 2019-05-16 | End: 2019-08-20

## 2019-05-16 RX ORDER — AMLODIPINE BESYLATE 10 MG/1
10 TABLET ORAL DAILY
Qty: 90 TABLET | Refills: 1 | Status: ON HOLD | OUTPATIENT
Start: 2019-05-16 | End: 2020-01-07

## 2019-05-16 NOTE — TELEPHONE ENCOUNTER
Refill request for amlodipine, lisinopril, glimepride  medication.      Name of Evan [employer]    Last visit - 5/8/19     Pending visit - 5/23/19    Last refill -3/28/19  3 refills, 7/20/18  2 refills, 3/7/19

## 2019-05-23 ENCOUNTER — OFFICE VISIT (OUTPATIENT)
Dept: INTERNAL MEDICINE CLINIC | Age: 84
End: 2019-05-23
Payer: MEDICARE

## 2019-05-23 VITALS
BODY MASS INDEX: 21.96 KG/M2 | WEIGHT: 134 LBS | HEART RATE: 76 BPM | OXYGEN SATURATION: 97 % | SYSTOLIC BLOOD PRESSURE: 112 MMHG | DIASTOLIC BLOOD PRESSURE: 62 MMHG

## 2019-05-23 DIAGNOSIS — E78.2 MIXED HYPERLIPIDEMIA: ICD-10-CM

## 2019-05-23 DIAGNOSIS — N39.0 RECURRENT UTI: ICD-10-CM

## 2019-05-23 DIAGNOSIS — E11.9 TYPE 2 DIABETES MELLITUS WITHOUT COMPLICATION, WITHOUT LONG-TERM CURRENT USE OF INSULIN (HCC): Primary | ICD-10-CM

## 2019-05-23 DIAGNOSIS — T14.8XXA BITES: ICD-10-CM

## 2019-05-23 DIAGNOSIS — F03.90 DEMENTIA WITHOUT BEHAVIORAL DISTURBANCE, UNSPECIFIED DEMENTIA TYPE: ICD-10-CM

## 2019-05-23 DIAGNOSIS — I10 ESSENTIAL HYPERTENSION: ICD-10-CM

## 2019-05-23 DIAGNOSIS — E03.8 SUBCLINICAL HYPOTHYROIDISM: ICD-10-CM

## 2019-05-23 LAB — HBA1C MFR BLD: 9.1 %

## 2019-05-23 PROCEDURE — 1123F ACP DISCUSS/DSCN MKR DOCD: CPT | Performed by: FAMILY MEDICINE

## 2019-05-23 PROCEDURE — 1090F PRES/ABSN URINE INCON ASSESS: CPT | Performed by: FAMILY MEDICINE

## 2019-05-23 PROCEDURE — G8427 DOCREV CUR MEDS BY ELIG CLIN: HCPCS | Performed by: FAMILY MEDICINE

## 2019-05-23 PROCEDURE — G8400 PT W/DXA NO RESULTS DOC: HCPCS | Performed by: FAMILY MEDICINE

## 2019-05-23 PROCEDURE — 99214 OFFICE O/P EST MOD 30 MIN: CPT | Performed by: FAMILY MEDICINE

## 2019-05-23 PROCEDURE — 83036 HEMOGLOBIN GLYCOSYLATED A1C: CPT | Performed by: FAMILY MEDICINE

## 2019-05-23 PROCEDURE — G8420 CALC BMI NORM PARAMETERS: HCPCS | Performed by: FAMILY MEDICINE

## 2019-05-23 PROCEDURE — 81002 URINALYSIS NONAUTO W/O SCOPE: CPT | Performed by: FAMILY MEDICINE

## 2019-05-23 PROCEDURE — 1036F TOBACCO NON-USER: CPT | Performed by: FAMILY MEDICINE

## 2019-05-23 PROCEDURE — 4040F PNEUMOC VAC/ADMIN/RCVD: CPT | Performed by: FAMILY MEDICINE

## 2019-05-23 RX ORDER — TRIAMCINOLONE ACETONIDE 1 MG/G
CREAM TOPICAL
Qty: 80 G | Refills: 0 | Status: ON HOLD | OUTPATIENT
Start: 2019-05-23 | End: 2019-08-24 | Stop reason: HOSPADM

## 2019-05-23 ASSESSMENT — ENCOUNTER SYMPTOMS
RHINORRHEA: 0
VOMITING: 0
ABDOMINAL PAIN: 0
WHEEZING: 0
COUGH: 0
BACK PAIN: 1
NAUSEA: 0
DIARRHEA: 0
CONSTIPATION: 0
TROUBLE SWALLOWING: 0
SORE THROAT: 0
CHOKING: 0

## 2019-05-23 NOTE — PROGRESS NOTES
MG TABS tablet Take 1 tablet by mouth daily 30 tablet 11    atorvastatin (LIPITOR) 40 MG tablet TAKE ONE TABLET BY MOUTH DAILY 90 tablet 3    aspirin 81 MG chewable tablet Take 1 tablet by mouth daily 30 tablet 3    Lift Chair MISC by Does not apply route Power left chair 1 each 0    KROGER LANCETS ULTRATHIN 30G MISC USE ONE LANCET TO TEST BLOOD SUGAR LEVELS ONCE DAILY, Dx: E11.9 100 each 3    MICROLET LANCETS MISC 1 each by Does not apply route daily Dx E11.92 100 each 3    Glucos-Chond-Hyal Ac-Ca Fructo (MOVE FREE JOINT HEALTH ADVANCE PO) Take by mouth      Coenzyme Q10 (CO Q 10 PO) Take 1 tablet by mouth daily      KROGER LANCETS ULTRATHIN 30G MISC USE TO TEST BLOOD SUGAR LEVELS ONCE DAILY  Dx E11.9 100 each 3     No current facility-administered medications for this visit. Review of Systems   Constitutional: Positive for unexpected weight change (9 lb weight loss). Negative for activity change, appetite change, chills and fever. HENT: Negative for congestion, nosebleeds, postnasal drip, rhinorrhea, sneezing, sore throat and trouble swallowing. Eyes: Negative for visual disturbance. Respiratory: Negative for cough, choking and wheezing. Cardiovascular: Negative for leg swelling. Gastrointestinal: Negative for abdominal pain, constipation, diarrhea, nausea and vomiting. Genitourinary: Negative for difficulty urinating, dysuria, frequency, hematuria, vaginal discharge and vaginal pain. Musculoskeletal: Positive for arthralgias (knees), back pain (improved) and gait problem (due to replaced hip and scoliotic spine). Negative for neck stiffness. Skin: Positive for rash (itchy spots on abdomen) and wound (sore on right buttock). Neurological: Negative for numbness. Psychiatric/Behavioral: Positive for sleep disturbance (chronic). Negative for dysphoric mood. Physical Exam   Constitutional: She is oriented to person, place, and time.  She appears well-developed and A1C)  - Basic Metabolic Panel; Future  - Diabetic Foot Exam  - Basic Metabolic Panel    2. Essential hypertension  BP is well-controlled today   Continue on amlodipine and lisinopril  continue watching BP closely  Rec low salt diet    3. Mixed hyperlipidemia  Stable on Lipitor 40 mg  Tolerating statin well without side effect    4. Subclinical hypothyroidism  TFT's have remained stable in subclinical stage  Will continue to monitor    5. Recurrent UTI  UA was reassuring for no UTI currently    - POCT Urinalysis no Micro    6. Bites  Suspect spots on her abdomen are due to insect bites (possibly from recent rehab stay?)  Sent Rx for steroid cream to use topically on affected areas    - triamcinolone (KENALOG) 0.1 % cream; Apply topically to ichy red spots 2 times daily as needed  Dispense: 80 g; Refill: 0    7.  Dementia without behavioral disturbance, unspecified dementia type  Pt's cognitive status has been slowly declining, perhaps accelerated by recent health issues and hospitalizations  Possibly due to Alzheimer's or vascular variant, and/or combination  Pt has increased supports at home (with daily caregiver), which appear to be enabling her to remain at home and tend to her health needs more consistently (with medications, checking BS, etc...)       I notified Pt  of my upcoming departure, and helped guide her on plans for f/u    Return in about 2 months (around 8/1/2019) for DM, HTN, HLD with Dorys Brasher NP.

## 2019-05-23 NOTE — PATIENT INSTRUCTIONS
Try to eat low sugar ice cream    Use lotion all over for dry skin to help with itching     Can use steroid cream triamcinolone to itchy red spots as needed    I will increase your dose of Invokana to 300 mg if your kidney function is ok on the bloodwork

## 2019-05-24 LAB
ANION GAP SERPL CALCULATED.3IONS-SCNC: 18 MMOL/L (ref 3–16)
BUN BLDV-MCNC: 33 MG/DL (ref 7–20)
CALCIUM SERPL-MCNC: 9.6 MG/DL (ref 8.3–10.6)
CHLORIDE BLD-SCNC: 99 MMOL/L (ref 99–110)
CO2: 24 MMOL/L (ref 21–32)
CREAT SERPL-MCNC: 1.8 MG/DL (ref 0.6–1.2)
GFR AFRICAN AMERICAN: 33
GFR NON-AFRICAN AMERICAN: 27
GLUCOSE BLD-MCNC: 302 MG/DL (ref 70–99)
POTASSIUM SERPL-SCNC: 4.6 MMOL/L (ref 3.5–5.1)
SODIUM BLD-SCNC: 141 MMOL/L (ref 136–145)

## 2019-05-28 DIAGNOSIS — R79.89 ELEVATED SERUM CREATININE: Primary | ICD-10-CM

## 2019-05-28 NOTE — RESULT ENCOUNTER NOTE
Please call Pt and her daughter with recent bloodwork results:    BMP with elevated kidney numbers again (BUN and Cr are up), glu 302 - need to drink more water, and work on less sugar in diet     I am not comfortable increasing her Invokana at this time given bump in kidney function, so need to work with diet changes and better hydration  Please recheck bloodwork in 1-2 weeks; I placed order for another BMP

## 2019-05-31 ENCOUNTER — NURSE ONLY (OUTPATIENT)
Dept: INTERNAL MEDICINE CLINIC | Age: 84
End: 2019-05-31
Payer: MEDICARE

## 2019-05-31 DIAGNOSIS — N39.0 RECURRENT UTI: Primary | ICD-10-CM

## 2019-05-31 LAB
BILIRUBIN, POC: ABNORMAL
BLOOD URINE, POC: ABNORMAL
CLARITY, POC: ABNORMAL
COLOR, POC: YELLOW
GLUCOSE URINE, POC: 2000
KETONES, POC: ABNORMAL
LEUKOCYTE EST, POC: ABNORMAL
NITRITE, POC: NEGATIVE
PH, POC: 6.5
PROTEIN, POC: ABNORMAL
SPECIFIC GRAVITY, POC: 1.01
UROBILINOGEN, POC: ABNORMAL

## 2019-05-31 PROCEDURE — 81002 URINALYSIS NONAUTO W/O SCOPE: CPT | Performed by: FAMILY MEDICINE

## 2019-05-31 NOTE — PROGRESS NOTES
Patient came into the office today for us to check her urine. She was unable to void at her last appointment.

## 2019-06-02 LAB
ORGANISM: ABNORMAL
URINE CULTURE, ROUTINE: ABNORMAL
URINE CULTURE, ROUTINE: ABNORMAL

## 2019-06-04 RX ORDER — FLUCONAZOLE 100 MG/1
100 TABLET ORAL DAILY
Qty: 3 TABLET | Refills: 0 | Status: SHIPPED | OUTPATIENT
Start: 2019-06-04 | End: 2019-06-07

## 2019-06-12 ENCOUNTER — TELEPHONE (OUTPATIENT)
Dept: INTERNAL MEDICINE CLINIC | Age: 84
End: 2019-06-12

## 2019-06-12 NOTE — TELEPHONE ENCOUNTER
FYI  Per nurse at [facility] - patients Blood sugars have been a little higher than usual this week - lowest was 168 and highest was 221 fasting. She is compliant, except she has ice cream before going to bed. .  Any questions call Salud Moore RN at [phone]

## 2019-06-12 NOTE — TELEPHONE ENCOUNTER
Please let her Joel Campuzano RN know that I am hesitant to increase doses of her diabetic medications at this time due to recent worsening of her renal function. Is she able to help facilitate the recheck on her BMP bloodwork that is due?

## 2019-06-13 NOTE — TELEPHONE ENCOUNTER
Sera calling back, and she will draw the BMP tomorrow.   She put the verbal order in and a fax will be sent for signature

## 2019-06-19 LAB
ANION GAP SERPL CALCULATED.3IONS-SCNC: 14 MMOL/L (ref 7–16)
BUN BLDV-MCNC: 30 MG/DL (ref 8–23)
CALCIUM SERPL-MCNC: 9.5 MG/DL (ref 8.8–10.4)
CHLORIDE BLD-SCNC: 100 MEQ/L (ref 98–107)
CO2: 23 MMOL/L (ref 22–29)
CREAT SERPL-MCNC: 1.62 MG/DL (ref 0.51–1.3)
GFR AFRICAN AMERICAN: 34 ML/MIN/1.73 M2
GFR NON-AFRICAN AMERICAN: 29 ML/MIN/1.73 M2
GLUCOSE BLD-MCNC: 338 MG/DL (ref 82–100)
POTASSIUM SERPL-SCNC: 4.3 MEQ/L (ref 3.5–5)
SODIUM BLD-SCNC: 137 MEQ/L (ref 136–145)

## 2019-06-20 NOTE — RESULT ENCOUNTER NOTE
Please call Pt AND her daughter with recent bloodwork results:    Kidney function has improved slightly, though not yet back to baseline  Glucose was very elevated at 338    Please have her come in for an appointment with Dae José NP next week if possible to try to get blood sugars better regulated, and be sure to drink more water

## 2019-06-26 ENCOUNTER — TELEPHONE (OUTPATIENT)
Dept: INTERNAL MEDICINE CLINIC | Age: 84
End: 2019-06-26

## 2019-06-26 NOTE — TELEPHONE ENCOUNTER
Patient has a follow up appt tomorrow and nurse, Loretta Pérez  at [facility] is asking for a urine analysis to be done  - she has had recent burning after urination . Her blood pressure has also been running higher than normal lately.

## 2019-06-27 ENCOUNTER — OFFICE VISIT (OUTPATIENT)
Dept: INTERNAL MEDICINE CLINIC | Age: 84
End: 2019-06-27
Payer: MEDICARE

## 2019-06-27 VITALS
WEIGHT: 131 LBS | HEIGHT: 66 IN | SYSTOLIC BLOOD PRESSURE: 130 MMHG | BODY MASS INDEX: 21.05 KG/M2 | TEMPERATURE: 98.4 F | OXYGEN SATURATION: 97 % | HEART RATE: 77 BPM | DIASTOLIC BLOOD PRESSURE: 60 MMHG

## 2019-06-27 DIAGNOSIS — E11.9 TYPE 2 DIABETES MELLITUS WITHOUT COMPLICATION, WITHOUT LONG-TERM CURRENT USE OF INSULIN (HCC): Primary | ICD-10-CM

## 2019-06-27 DIAGNOSIS — R30.0 DYSURIA: ICD-10-CM

## 2019-06-27 LAB
BILIRUBIN, POC: ABNORMAL
BLOOD URINE, POC: + 3
CLARITY, POC: CLEAR
COLOR, POC: ABNORMAL
CREATININE URINE POCT: ABNORMAL
GLUCOSE URINE, POC: +    1000
KETONES, POC: ABNORMAL
LEUKOCYTE EST, POC: +  3
MICROALBUMIN/CREAT 24H UR: + 50 MG/G{CREAT}
MICROALBUMIN/CREAT UR-RTO: ABNORMAL
NITRITE, POC: ABNORMAL
PH, POC: 5
PROTEIN, POC: + 2000
SPECIFIC GRAVITY, POC: 1020
UROBILINOGEN, POC: ABNORMAL

## 2019-06-27 PROCEDURE — 1123F ACP DISCUSS/DSCN MKR DOCD: CPT | Performed by: NURSE PRACTITIONER

## 2019-06-27 PROCEDURE — G8427 DOCREV CUR MEDS BY ELIG CLIN: HCPCS | Performed by: NURSE PRACTITIONER

## 2019-06-27 PROCEDURE — 4040F PNEUMOC VAC/ADMIN/RCVD: CPT | Performed by: NURSE PRACTITIONER

## 2019-06-27 PROCEDURE — G8420 CALC BMI NORM PARAMETERS: HCPCS | Performed by: NURSE PRACTITIONER

## 2019-06-27 PROCEDURE — 1090F PRES/ABSN URINE INCON ASSESS: CPT | Performed by: NURSE PRACTITIONER

## 2019-06-27 PROCEDURE — 82044 UR ALBUMIN SEMIQUANTITATIVE: CPT | Performed by: NURSE PRACTITIONER

## 2019-06-27 PROCEDURE — 99214 OFFICE O/P EST MOD 30 MIN: CPT | Performed by: NURSE PRACTITIONER

## 2019-06-27 PROCEDURE — G8400 PT W/DXA NO RESULTS DOC: HCPCS | Performed by: NURSE PRACTITIONER

## 2019-06-27 PROCEDURE — 81002 URINALYSIS NONAUTO W/O SCOPE: CPT | Performed by: NURSE PRACTITIONER

## 2019-06-27 PROCEDURE — 1036F TOBACCO NON-USER: CPT | Performed by: NURSE PRACTITIONER

## 2019-06-27 RX ORDER — NITROFURANTOIN 25; 75 MG/1; MG/1
100 CAPSULE ORAL 2 TIMES DAILY
Qty: 14 CAPSULE | Refills: 0 | Status: SHIPPED | OUTPATIENT
Start: 2019-06-27 | End: 2019-07-04

## 2019-06-27 RX ORDER — PEN NEEDLE, DIABETIC 30 GX5/16"
1 NEEDLE, DISPOSABLE MISCELLANEOUS DAILY
Qty: 30 EACH | Refills: 3 | Status: ON HOLD | OUTPATIENT
Start: 2019-06-27 | End: 2019-08-24 | Stop reason: HOSPADM

## 2019-06-27 ASSESSMENT — ENCOUNTER SYMPTOMS
DIARRHEA: 0
EYE DISCHARGE: 0
NAUSEA: 0
COUGH: 0
VOMITING: 0
BLOOD IN STOOL: 0
ABDOMINAL PAIN: 0
CONSTIPATION: 0
WHEEZING: 0
SHORTNESS OF BREATH: 0

## 2019-06-27 NOTE — PROGRESS NOTES
06/27/19    Brent Sotelo  82 y.o.  female    Chief Complaint   Patient presents with    Diabetes       Glucose Running High         HPI:   Pt presents with daughter at the direction of DR. Cool due to uncontrolled DM. Daughter is quite frustrated in that she shops for her Mom and brings in healthy foods, but her Mom has people take her to [place] and buys pints of ice cream frequently and buys cakes, etc. Patient lives alone. Kidney function is too poor for PO meds other than invokana. Pt also having dysuria and requests UA. /60 (Site: Right Upper Arm, Position: Sitting, Cuff Size: Medium Adult)   Pulse 77   Temp 98.4 °F (36.9 °C)   Ht 5' 5.5\" (1.664 m)   Wt 131 lb (59.4 kg)   SpO2 97%   BMI 21.47 kg/m²        Current Outpatient Medications   Medication Sig Dispense Refill    blood glucose test strips (CONTOUR TEST) strip USE ONE STRIP TO TEST DAILY.  Dx E11.9 100 strip 5    triamcinolone (KENALOG) 0.1 % cream Apply topically to ichy red spots 2 times daily as needed 80 g 0    lisinopril (PRINIVIL;ZESTRIL) 20 MG tablet TAKE ONE TABLET BY MOUTH TWICE A  tablet 1    amLODIPine (NORVASC) 10 MG tablet Take 1 tablet by mouth daily 90 tablet 1    oxybutynin (DITROPAN) 5 MG tablet TAKE ONE TABLET BY MOUTH Two TIMES A  tablet 3    metoprolol tartrate (LOPRESSOR) 50 MG tablet Take 1 tablet by mouth 2 times daily 180 tablet 3    canagliflozin (INVOKANA) 100 MG TABS tablet Take 1 tablet by mouth daily 30 tablet 11    atorvastatin (LIPITOR) 40 MG tablet TAKE ONE TABLET BY MOUTH DAILY 90 tablet 3    aspirin 81 MG chewable tablet Take 1 tablet by mouth daily 30 tablet 3    Lift Chair MISC by Does not apply route Power left chair 1 each 0    KROGER LANCETS ULTRATHIN 30G MISC USE ONE LANCET TO TEST BLOOD SUGAR LEVELS ONCE DAILY, Dx: E11.9 100 each 3    MICROLET LANCETS MISC 1 each by Does not apply route daily Dx E11.92 100 each 3    Glucos-Chond-Hyal Ac-Ca Fructo (MOVE FREE JOINT HEALTH ADVANCE PO) Take by mouth      Coenzyme Q10 (CO Q 10 PO) Take 1 tablet by mouth daily      KROGER LANCETS ULTRATHIN 30G MISC USE TO TEST BLOOD SUGAR LEVELS ONCE DAILY  Dx E11.9 100 each 3     No current facility-administered medications for this visit.         Social History     Socioeconomic History    Marital status:      Spouse name: Not on file    Number of children: Not on file    Years of education: Not on file    Highest education level: Not on file   Occupational History    Not on file   Social Needs    Financial resource strain: Not on file    Food insecurity:     Worry: Not on file     Inability: Not on file    Transportation needs:     Medical: Not on file     Non-medical: Not on file   Tobacco Use    Smoking status: Never Smoker    Smokeless tobacco: Never Used   Substance and Sexual Activity    Alcohol use: Yes     Comment: rarely    Drug use: No    Sexual activity: Never   Lifestyle    Physical activity:     Days per week: Not on file     Minutes per session: Not on file    Stress: Not on file   Relationships    Social connections:     Talks on phone: Not on file     Gets together: Not on file     Attends Sikhism service: Not on file     Active member of club or organization: Not on file     Attends meetings of clubs or organizations: Not on file     Relationship status: Not on file    Intimate partner violence:     Fear of current or ex partner: Not on file     Emotionally abused: Not on file     Physically abused: Not on file     Forced sexual activity: Not on file   Other Topics Concern    Not on file   Social History Narrative    Not on file       Family History   Problem Relation Age of Onset    Cancer Mother     Heart Disease Father     Cancer Sister        Past Medical History:   Diagnosis Date    DJD (degenerative joint disease)     Eczema     Elevated LFTs     HBP (high blood pressure)     Hyperlipidemia     NIDDM (non-insulin dependent diabetes mellitus)     Scoliosis        Review of Systems   Constitutional: Negative for fever. HENT: Negative for congestion. Eyes: Negative for discharge. Respiratory: Negative for cough, shortness of breath and wheezing. Cardiovascular: Negative for chest pain, palpitations and leg swelling. Gastrointestinal: Negative for abdominal pain, blood in stool, constipation, diarrhea, nausea and vomiting. Genitourinary: Negative for dysuria and hematuria. Musculoskeletal: Negative for myalgias. Skin: Negative for rash. Neurological: Negative for dizziness. Psychiatric/Behavioral: The patient is not nervous/anxious. Physical Exam   Constitutional: She is oriented to person, place, and time. No distress. HENT:   Right Ear: External ear normal.   Left Ear: External ear normal.   Mouth/Throat: Oropharynx is clear and moist. No oropharyngeal exudate. Eyes: Right eye exhibits no discharge. Left eye exhibits no discharge. No scleral icterus. Neck: Normal range of motion. No thyromegaly present. Cardiovascular: Normal rate, regular rhythm, normal heart sounds and intact distal pulses. Exam reveals no gallop and no friction rub. No murmur heard. Pulmonary/Chest: Effort normal and breath sounds normal. She has no wheezes. Abdominal: Soft. Bowel sounds are normal. She exhibits no distension. There is no tenderness. Musculoskeletal: Normal range of motion. She exhibits no edema or tenderness. Lymphadenopathy:     She has no cervical adenopathy. Neurological: She is alert and oriented to person, place, and time. Skin: Skin is warm and dry. No rash noted. She is not diaphoretic. No erythema. Psychiatric: Judgment normal.     1. Type 2 diabetes mellitus without complication, without long-term current use of insulin ([facility])    - POCT microalbumin 100  Gfr improved from 27-29    2.  Dysuria  UTI-will treat with macrobid x 7 days  - POCT Urinalysis no Micro     Reviewed insulin injection technique. Daughter will have family member who is a nurse help her Mom. They will return next week with glucose log for med titration.   REI Maldonado - CNP

## 2019-07-05 ENCOUNTER — OFFICE VISIT (OUTPATIENT)
Dept: INTERNAL MEDICINE CLINIC | Age: 84
End: 2019-07-05
Payer: MEDICARE

## 2019-07-05 VITALS
BODY MASS INDEX: 20.89 KG/M2 | DIASTOLIC BLOOD PRESSURE: 64 MMHG | SYSTOLIC BLOOD PRESSURE: 120 MMHG | HEIGHT: 66 IN | WEIGHT: 130 LBS | TEMPERATURE: 98.9 F

## 2019-07-05 DIAGNOSIS — E11.9 TYPE 2 DIABETES MELLITUS WITHOUT COMPLICATION, WITHOUT LONG-TERM CURRENT USE OF INSULIN (HCC): Primary | ICD-10-CM

## 2019-07-05 DIAGNOSIS — N39.0 RECURRENT UTI: ICD-10-CM

## 2019-07-05 LAB
BILIRUBIN, POC: ABNORMAL
BLOOD URINE, POC: + 3
CLARITY, POC: CLEAR
COLOR, POC: ABNORMAL
GLUCOSE URINE, POC: + 1000
KETONES, POC: ABNORMAL
LEUKOCYTE EST, POC: ABNORMAL
NITRITE, POC: ABNORMAL
PH, POC: 5
PROTEIN, POC: + 100
SPECIFIC GRAVITY, POC: 1010
UROBILINOGEN, POC: ABNORMAL

## 2019-07-05 PROCEDURE — G8400 PT W/DXA NO RESULTS DOC: HCPCS | Performed by: NURSE PRACTITIONER

## 2019-07-05 PROCEDURE — 81002 URINALYSIS NONAUTO W/O SCOPE: CPT | Performed by: NURSE PRACTITIONER

## 2019-07-05 PROCEDURE — 99213 OFFICE O/P EST LOW 20 MIN: CPT | Performed by: NURSE PRACTITIONER

## 2019-07-05 PROCEDURE — G8427 DOCREV CUR MEDS BY ELIG CLIN: HCPCS | Performed by: NURSE PRACTITIONER

## 2019-07-05 PROCEDURE — 1036F TOBACCO NON-USER: CPT | Performed by: NURSE PRACTITIONER

## 2019-07-05 PROCEDURE — 4040F PNEUMOC VAC/ADMIN/RCVD: CPT | Performed by: NURSE PRACTITIONER

## 2019-07-05 PROCEDURE — 1090F PRES/ABSN URINE INCON ASSESS: CPT | Performed by: NURSE PRACTITIONER

## 2019-07-05 PROCEDURE — 1123F ACP DISCUSS/DSCN MKR DOCD: CPT | Performed by: NURSE PRACTITIONER

## 2019-07-05 PROCEDURE — G8420 CALC BMI NORM PARAMETERS: HCPCS | Performed by: NURSE PRACTITIONER

## 2019-07-05 ASSESSMENT — ENCOUNTER SYMPTOMS
EYE DISCHARGE: 0
SHORTNESS OF BREATH: 0
ABDOMINAL PAIN: 0
NAUSEA: 0
COUGH: 0
VOMITING: 0
CONSTIPATION: 0
DIARRHEA: 0
BLOOD IN STOOL: 0
WHEEZING: 0

## 2019-07-05 NOTE — PROGRESS NOTES
Other Topics Concern    Not on file   Social History Narrative    Not on file       Family History   Problem Relation Age of Onset    Cancer Mother     Heart Disease Father     Cancer Sister        Past Medical History:   Diagnosis Date    DJD (degenerative joint disease)     Eczema     Elevated LFTs     HBP (high blood pressure)     Hyperlipidemia     NIDDM (non-insulin dependent diabetes mellitus)     Scoliosis        Review of Systems   Constitutional: Negative for fever. HENT: Negative for congestion. Eyes: Negative for discharge. Respiratory: Negative for cough, shortness of breath and wheezing. Cardiovascular: Negative for chest pain, palpitations and leg swelling. Gastrointestinal: Negative for abdominal pain, blood in stool, constipation, diarrhea, nausea and vomiting. Genitourinary: Negative for dysuria and hematuria. Musculoskeletal: Negative for myalgias. Skin: Negative for rash. Neurological: Negative for dizziness. Psychiatric/Behavioral: The patient is not nervous/anxious. Physical Exam   Constitutional: She is oriented to person, place, and time. No distress. HENT:   Right Ear: External ear normal.   Left Ear: External ear normal.   Mouth/Throat: Oropharynx is clear and moist. No oropharyngeal exudate. Eyes: Right eye exhibits no discharge. Left eye exhibits no discharge. No scleral icterus. Neck: Normal range of motion. No thyromegaly present. Cardiovascular: Normal rate, regular rhythm, normal heart sounds and intact distal pulses. Exam reveals no gallop and no friction rub. No murmur heard. Pulmonary/Chest: Effort normal and breath sounds normal. She has no wheezes. Abdominal: Soft. Bowel sounds are normal. She exhibits no distension. There is no tenderness. Musculoskeletal: Normal range of motion. She exhibits no edema or tenderness. Lymphadenopathy:     She has no cervical adenopathy.    Neurological: She is alert and oriented to person,

## 2019-07-18 ENCOUNTER — NURSE ONLY (OUTPATIENT)
Dept: INTERNAL MEDICINE CLINIC | Age: 84
End: 2019-07-18
Payer: MEDICARE

## 2019-07-18 DIAGNOSIS — R35.0 FREQUENT URINATION: Primary | ICD-10-CM

## 2019-07-18 LAB
BILIRUBIN, POC: NEGATIVE
BLOOD URINE, POC: ABNORMAL
CLARITY, POC: ABNORMAL
COLOR, POC: YELLOW
GLUCOSE URINE, POC: 2000
KETONES, POC: POSITIVE
LEUKOCYTE EST, POC: ABNORMAL
NITRITE, POC: NEGATIVE
PH, POC: 6.5
PROTEIN, POC: ABNORMAL
SPECIFIC GRAVITY, POC: 1.01
UROBILINOGEN, POC: NORMAL

## 2019-07-18 PROCEDURE — 81002 URINALYSIS NONAUTO W/O SCOPE: CPT | Performed by: NURSE PRACTITIONER

## 2019-07-20 LAB
ORGANISM: ABNORMAL
URINE CULTURE, ROUTINE: ABNORMAL
URINE CULTURE, ROUTINE: ABNORMAL

## 2019-07-21 ENCOUNTER — TELEPHONE (OUTPATIENT)
Dept: INTERNAL MEDICINE CLINIC | Age: 84
End: 2019-07-21

## 2019-07-22 DIAGNOSIS — B37.41 YEAST CYSTITIS: Primary | ICD-10-CM

## 2019-07-22 RX ORDER — FLUCONAZOLE 100 MG/1
TABLET ORAL
Qty: 8 TABLET | Refills: 0 | Status: ON HOLD | OUTPATIENT
Start: 2019-07-22 | End: 2019-08-24 | Stop reason: HOSPADM

## 2019-07-25 ENCOUNTER — TELEPHONE (OUTPATIENT)
Dept: INTERNAL MEDICINE CLINIC | Age: 84
End: 2019-07-25

## 2019-07-25 NOTE — TELEPHONE ENCOUNTER
I spoke with patient on the phone. She states she still feels that she has a UTI experiencing urgency and periods of incontinence. She is agreeable to come in tomorrow for evaluation. Asked for patient to be called and scheduled.

## 2019-07-26 ENCOUNTER — TELEPHONE (OUTPATIENT)
Dept: INTERNAL MEDICINE CLINIC | Age: 84
End: 2019-07-26

## 2019-07-26 ENCOUNTER — OFFICE VISIT (OUTPATIENT)
Dept: INTERNAL MEDICINE CLINIC | Age: 84
End: 2019-07-26
Payer: MEDICARE

## 2019-07-26 VITALS — BODY MASS INDEX: 21.63 KG/M2 | WEIGHT: 132 LBS

## 2019-07-26 DIAGNOSIS — N39.0 RECURRENT UTI: Primary | ICD-10-CM

## 2019-07-26 DIAGNOSIS — R31.0 GROSS HEMATURIA: ICD-10-CM

## 2019-07-26 DIAGNOSIS — R35.0 FREQUENT URINATION: ICD-10-CM

## 2019-07-26 LAB
BILIRUBIN, POC: NEGATIVE
BLOOD URINE, POC: ABNORMAL
CLARITY, POC: ABNORMAL
COLOR, POC: YELLOW
GLUCOSE URINE, POC: ABNORMAL
KETONES, POC: NEGATIVE
LEUKOCYTE EST, POC: ABNORMAL
NITRITE, POC: NEGATIVE
PH, POC: 7
PROTEIN, POC: ABNORMAL
SPECIFIC GRAVITY, POC: 1.01
UROBILINOGEN, POC: NEGATIVE

## 2019-07-26 PROCEDURE — 1090F PRES/ABSN URINE INCON ASSESS: CPT | Performed by: NURSE PRACTITIONER

## 2019-07-26 PROCEDURE — 99213 OFFICE O/P EST LOW 20 MIN: CPT | Performed by: NURSE PRACTITIONER

## 2019-07-26 PROCEDURE — 1036F TOBACCO NON-USER: CPT | Performed by: NURSE PRACTITIONER

## 2019-07-26 PROCEDURE — G8400 PT W/DXA NO RESULTS DOC: HCPCS | Performed by: NURSE PRACTITIONER

## 2019-07-26 PROCEDURE — 1123F ACP DISCUSS/DSCN MKR DOCD: CPT | Performed by: NURSE PRACTITIONER

## 2019-07-26 PROCEDURE — G8420 CALC BMI NORM PARAMETERS: HCPCS | Performed by: NURSE PRACTITIONER

## 2019-07-26 PROCEDURE — 4040F PNEUMOC VAC/ADMIN/RCVD: CPT | Performed by: NURSE PRACTITIONER

## 2019-07-26 PROCEDURE — 81002 URINALYSIS NONAUTO W/O SCOPE: CPT | Performed by: NURSE PRACTITIONER

## 2019-07-26 PROCEDURE — G8427 DOCREV CUR MEDS BY ELIG CLIN: HCPCS | Performed by: NURSE PRACTITIONER

## 2019-07-26 NOTE — PROGRESS NOTES
2019     Ana Ramirez (:  1935) is a 82 y.o. female, here for evaluation of the following medical concerns:    Gaby Mohamud is here with her health aid and is c/o waking at night d/t urinary frequency every 30 min. She is napping in her chair during the day. Pt reports she is not drinking a lot of water. Urine dip today shows positive for- Lg Glucose, Lg blood, Sm Leukocytes. Pt denies dysuria, flank pain, chills, f/n/v/d. PMHx-  DM is uncontrolled, has a h/o recurrent UTI, hematuria. In reviewing chart notes - 19 pt was started on Cipro and post culture 19 pt was to discontinue Cipro and start Diflucan as culture revealed yeast opposed to having a UTI. Pt reports she did not discontinue Cipro and completed the course of antibiotic and has not started the Diflucan as of yet. However I do not see where Cipro was prescribed. Review of Systems   Constitutional: Positive for activity change and fatigue. Negative for chills and fever. Respiratory: Negative for cough and shortness of breath. Cardiovascular: Negative for chest pain, palpitations and leg swelling. Gastrointestinal: Negative for diarrhea, nausea and vomiting. Genitourinary: Positive for frequency and urgency. Negative for dysuria, flank pain and pelvic pain. Neurological: Negative. Psychiatric/Behavioral: Positive for agitation. Prior to Visit Medications    Medication Sig Taking? Authorizing Provider   fluconazole (DIFLUCAN) 100 MG tablet Take 2 tablets on day 1 and then 1 tab daily thereafter Yes REI Garland CNP   insulin glargine (TOUJEO SOLOSTAR) 300 UNIT/ML injection pen Inject 10 Units into the skin daily Yes REI Juares CNP   Insulin Pen Needle (PEN NEEDLES 3/16\") 31G X 5 MM MISC 1 each by Does not apply route daily Yes REI Juares CNP   blood glucose test strips (CONTOUR TEST) strip USE ONE STRIP TO TEST DAILY.  Dx E11.9 Yes Abby Dubois MD   triamcinolone (KENALOG)

## 2019-07-28 LAB — URINE CULTURE, ROUTINE: NORMAL

## 2019-07-28 ASSESSMENT — ENCOUNTER SYMPTOMS
COUGH: 0
SHORTNESS OF BREATH: 0
DIARRHEA: 0
NAUSEA: 0
VOMITING: 0

## 2019-08-13 ENCOUNTER — APPOINTMENT (OUTPATIENT)
Dept: GENERAL RADIOLOGY | Age: 84
DRG: 683 | End: 2019-08-13
Payer: MEDICARE

## 2019-08-13 ENCOUNTER — TELEPHONE (OUTPATIENT)
Dept: INTERNAL MEDICINE CLINIC | Age: 84
End: 2019-08-13

## 2019-08-13 VITALS
DIASTOLIC BLOOD PRESSURE: 68 MMHG | RESPIRATION RATE: 16 BRPM | BODY MASS INDEX: 21.3 KG/M2 | TEMPERATURE: 98.2 F | OXYGEN SATURATION: 95 % | WEIGHT: 130 LBS | HEART RATE: 72 BPM | SYSTOLIC BLOOD PRESSURE: 113 MMHG

## 2019-08-13 PROCEDURE — 71101 X-RAY EXAM UNILAT RIBS/CHEST: CPT

## 2019-08-13 ASSESSMENT — PAIN SCALES - GENERAL: PAINLEVEL_OUTOF10: 8

## 2019-08-14 ENCOUNTER — HOSPITAL ENCOUNTER (EMERGENCY)
Age: 84
Discharge: HOME | DRG: 683 | End: 2019-08-14
Attending: EMERGENCY MEDICINE
Payer: MEDICARE

## 2019-08-14 DIAGNOSIS — S22.31XA CLOSED FRACTURE OF ONE RIB OF RIGHT SIDE, INITIAL ENCOUNTER: Primary | ICD-10-CM

## 2019-08-14 PROCEDURE — 99283 EMERGENCY DEPT VISIT LOW MDM: CPT

## 2019-08-14 RX ORDER — TRAMADOL HYDROCHLORIDE 50 MG/1
50 TABLET ORAL EVERY 4 HOURS PRN
Qty: 18 TABLET | Refills: 0 | Status: SHIPPED | OUTPATIENT
Start: 2019-08-14 | End: 2019-08-24 | Stop reason: HOSPADM

## 2019-08-14 ASSESSMENT — PAIN SCALES - GENERAL: PAINLEVEL_OUTOF10: 7

## 2019-08-17 ENCOUNTER — APPOINTMENT (OUTPATIENT)
Dept: GENERAL RADIOLOGY | Age: 84
DRG: 683 | End: 2019-08-17
Payer: MEDICARE

## 2019-08-17 ENCOUNTER — HOSPITAL ENCOUNTER (INPATIENT)
Age: 84
LOS: 7 days | Discharge: NURSING, REHAB OR LONG-TERM CARE | DRG: 683 | End: 2019-08-24
Attending: EMERGENCY MEDICINE | Admitting: INTERNAL MEDICINE
Payer: MEDICARE

## 2019-08-17 DIAGNOSIS — N17.9 AKI (ACUTE KIDNEY INJURY) (HCC): Primary | ICD-10-CM

## 2019-08-17 DIAGNOSIS — N30.00 ACUTE CYSTITIS WITHOUT HEMATURIA: ICD-10-CM

## 2019-08-17 LAB
ANION GAP SERPL CALCULATED.3IONS-SCNC: 15 MMOL/L (ref 3–16)
BACTERIA: ABNORMAL /HPF
BASOPHILS ABSOLUTE: 0.1 K/UL (ref 0–0.2)
BASOPHILS RELATIVE PERCENT: 0.3 %
BILIRUBIN URINE: NEGATIVE
BLOOD, URINE: ABNORMAL
BUN BLDV-MCNC: 66 MG/DL (ref 7–20)
CALCIUM SERPL-MCNC: 9.4 MG/DL (ref 8.3–10.6)
CHLORIDE BLD-SCNC: 89 MMOL/L (ref 99–110)
CLARITY: ABNORMAL
CO2: 23 MMOL/L (ref 21–32)
COLOR: YELLOW
COMMENT UA: ABNORMAL
CREAT SERPL-MCNC: 2.5 MG/DL (ref 0.6–1.2)
EOSINOPHILS ABSOLUTE: 0.4 K/UL (ref 0–0.6)
EOSINOPHILS RELATIVE PERCENT: 2.4 %
GFR AFRICAN AMERICAN: 22
GFR NON-AFRICAN AMERICAN: 18
GLUCOSE BLD-MCNC: 200 MG/DL (ref 70–99)
GLUCOSE URINE: 500 MG/DL
HCT VFR BLD CALC: 34.6 % (ref 36–48)
HEMOGLOBIN: 11.5 G/DL (ref 12–16)
KETONES, URINE: NEGATIVE MG/DL
LEUKOCYTE ESTERASE, URINE: ABNORMAL
LYMPHOCYTES ABSOLUTE: 1.2 K/UL (ref 1–5.1)
LYMPHOCYTES RELATIVE PERCENT: 7.3 %
MCH RBC QN AUTO: 27.9 PG (ref 26–34)
MCHC RBC AUTO-ENTMCNC: 33.2 G/DL (ref 31–36)
MCV RBC AUTO: 84.2 FL (ref 80–100)
MICROSCOPIC EXAMINATION: YES
MONOCYTES ABSOLUTE: 1 K/UL (ref 0–1.3)
MONOCYTES RELATIVE PERCENT: 6.2 %
NEUTROPHILS ABSOLUTE: 13.4 K/UL (ref 1.7–7.7)
NEUTROPHILS RELATIVE PERCENT: 83.8 %
NITRITE, URINE: NEGATIVE
PDW BLD-RTO: 14 % (ref 12.4–15.4)
PH UA: 5 (ref 5–8)
PLATELET # BLD: 337 K/UL (ref 135–450)
PMV BLD AUTO: 9.7 FL (ref 5–10.5)
POTASSIUM REFLEX MAGNESIUM: 5.5 MMOL/L (ref 3.5–5.1)
PROTEIN UA: 100 MG/DL
RBC # BLD: 4.11 M/UL (ref 4–5.2)
RBC UA: ABNORMAL /HPF (ref 0–2)
SODIUM BLD-SCNC: 127 MMOL/L (ref 136–145)
SPECIFIC GRAVITY UA: >=1.03 (ref 1–1.03)
TROPONIN: <0.01 NG/ML
URINE REFLEX TO CULTURE: YES
URINE TYPE: ABNORMAL
UROBILINOGEN, URINE: 0.2 E.U./DL
WBC # BLD: 16 K/UL (ref 4–11)
WBC UA: >100 /HPF (ref 0–5)

## 2019-08-17 PROCEDURE — 6370000000 HC RX 637 (ALT 250 FOR IP): Performed by: INTERNAL MEDICINE

## 2019-08-17 PROCEDURE — 93005 ELECTROCARDIOGRAM TRACING: CPT | Performed by: EMERGENCY MEDICINE

## 2019-08-17 PROCEDURE — 87086 URINE CULTURE/COLONY COUNT: CPT

## 2019-08-17 PROCEDURE — 80048 BASIC METABOLIC PNL TOTAL CA: CPT

## 2019-08-17 PROCEDURE — 6360000002 HC RX W HCPCS: Performed by: EMERGENCY MEDICINE

## 2019-08-17 PROCEDURE — 71046 X-RAY EXAM CHEST 2 VIEWS: CPT

## 2019-08-17 PROCEDURE — 85025 COMPLETE CBC W/AUTO DIFF WBC: CPT

## 2019-08-17 PROCEDURE — 84484 ASSAY OF TROPONIN QUANT: CPT

## 2019-08-17 PROCEDURE — 6370000000 HC RX 637 (ALT 250 FOR IP): Performed by: EMERGENCY MEDICINE

## 2019-08-17 PROCEDURE — 1200000000 HC SEMI PRIVATE

## 2019-08-17 PROCEDURE — 99285 EMERGENCY DEPT VISIT HI MDM: CPT

## 2019-08-17 PROCEDURE — 81001 URINALYSIS AUTO W/SCOPE: CPT

## 2019-08-17 PROCEDURE — 96365 THER/PROPH/DIAG IV INF INIT: CPT

## 2019-08-17 PROCEDURE — 2580000003 HC RX 258: Performed by: EMERGENCY MEDICINE

## 2019-08-17 RX ORDER — ACETAMINOPHEN 160 MG/5ML
975 SOLUTION ORAL ONCE
Status: COMPLETED | OUTPATIENT
Start: 2019-08-17 | End: 2019-08-17

## 2019-08-17 RX ORDER — LIDOCAINE 4 G/G
1 PATCH TOPICAL ONCE
Status: COMPLETED | OUTPATIENT
Start: 2019-08-17 | End: 2019-08-18

## 2019-08-17 RX ORDER — ONDANSETRON 4 MG/1
4 TABLET, ORALLY DISINTEGRATING ORAL ONCE
Status: COMPLETED | OUTPATIENT
Start: 2019-08-17 | End: 2019-08-17

## 2019-08-17 RX ORDER — 0.9 % SODIUM CHLORIDE 0.9 %
500 INTRAVENOUS SOLUTION INTRAVENOUS ONCE
Status: COMPLETED | OUTPATIENT
Start: 2019-08-17 | End: 2019-08-18

## 2019-08-17 RX ORDER — LIDOCAINE 4 G/G
1 PATCH TOPICAL DAILY
Status: DISCONTINUED | OUTPATIENT
Start: 2019-08-18 | End: 2019-08-24 | Stop reason: HOSPADM

## 2019-08-17 RX ADMIN — ONDANSETRON 4 MG: 4 TABLET, ORALLY DISINTEGRATING ORAL at 22:08

## 2019-08-17 RX ADMIN — SODIUM CHLORIDE 500 ML: 9 INJECTION, SOLUTION INTRAVENOUS at 22:45

## 2019-08-17 RX ADMIN — CEFTRIAXONE SODIUM 1 G: 1 INJECTION, POWDER, FOR SOLUTION INTRAMUSCULAR; INTRAVENOUS at 22:45

## 2019-08-17 RX ADMIN — ACETAMINOPHEN ORAL SOLUTION 975 MG: 650 SOLUTION ORAL at 22:07

## 2019-08-17 ASSESSMENT — PAIN DESCRIPTION - FREQUENCY
FREQUENCY: CONTINUOUS
FREQUENCY: CONTINUOUS

## 2019-08-17 ASSESSMENT — ENCOUNTER SYMPTOMS
RHINORRHEA: 0
COUGH: 0
SHORTNESS OF BREATH: 0
BACK PAIN: 1
DIARRHEA: 0
ABDOMINAL DISTENTION: 0
NAUSEA: 0
WHEEZING: 0
PHOTOPHOBIA: 0
VOMITING: 0

## 2019-08-17 ASSESSMENT — PAIN DESCRIPTION - ORIENTATION
ORIENTATION: RIGHT
ORIENTATION: RIGHT

## 2019-08-17 ASSESSMENT — PAIN DESCRIPTION - PAIN TYPE
TYPE: ACUTE PAIN
TYPE: ACUTE PAIN

## 2019-08-17 ASSESSMENT — PAIN DESCRIPTION - DESCRIPTORS
DESCRIPTORS: ACHING;CONSTANT
DESCRIPTORS: ACHING;THROBBING

## 2019-08-17 ASSESSMENT — PAIN DESCRIPTION - LOCATION
LOCATION: RIB CAGE
LOCATION: RIB CAGE

## 2019-08-17 ASSESSMENT — PAIN SCALES - GENERAL
PAINLEVEL_OUTOF10: 8
PAINLEVEL_OUTOF10: 5
PAINLEVEL_OUTOF10: 7
PAINLEVEL_OUTOF10: 7

## 2019-08-17 ASSESSMENT — PAIN DESCRIPTION - ONSET
ONSET: ON-GOING
ONSET: ON-GOING

## 2019-08-17 ASSESSMENT — PAIN DESCRIPTION - PROGRESSION
CLINICAL_PROGRESSION: NOT CHANGED
CLINICAL_PROGRESSION: GRADUALLY WORSENING

## 2019-08-18 ENCOUNTER — APPOINTMENT (OUTPATIENT)
Dept: ULTRASOUND IMAGING | Age: 84
DRG: 683 | End: 2019-08-18
Payer: MEDICARE

## 2019-08-18 LAB
ANION GAP SERPL CALCULATED.3IONS-SCNC: 13 MMOL/L (ref 3–16)
BUN BLDV-MCNC: 54 MG/DL (ref 7–20)
CALCIUM SERPL-MCNC: 8.9 MG/DL (ref 8.3–10.6)
CHLORIDE BLD-SCNC: 96 MMOL/L (ref 99–110)
CHLORIDE URINE RANDOM: 51 MMOL/L
CO2: 21 MMOL/L (ref 21–32)
CREAT SERPL-MCNC: 2 MG/DL (ref 0.6–1.2)
CREATININE URINE: 21.2 MG/DL (ref 28–259)
EKG ATRIAL RATE: 78 BPM
EKG DIAGNOSIS: NORMAL
EKG P AXIS: 49 DEGREES
EKG P-R INTERVAL: 148 MS
EKG Q-T INTERVAL: 398 MS
EKG QRS DURATION: 82 MS
EKG QTC CALCULATION (BAZETT): 453 MS
EKG R AXIS: -6 DEGREES
EKG T AXIS: 80 DEGREES
EKG VENTRICULAR RATE: 78 BPM
GFR AFRICAN AMERICAN: 29
GFR NON-AFRICAN AMERICAN: 24
GLUCOSE BLD-MCNC: 125 MG/DL (ref 70–99)
GLUCOSE BLD-MCNC: 174 MG/DL (ref 70–99)
GLUCOSE BLD-MCNC: 175 MG/DL (ref 70–99)
GLUCOSE BLD-MCNC: 187 MG/DL (ref 70–99)
GLUCOSE BLD-MCNC: 249 MG/DL (ref 70–99)
PERFORMED ON: ABNORMAL
POTASSIUM REFLEX MAGNESIUM: 4.7 MMOL/L (ref 3.5–5.1)
POTASSIUM, UR: 16.8 MMOL/L
SODIUM BLD-SCNC: 130 MMOL/L (ref 136–145)
SODIUM URINE: 61 MMOL/L
TOTAL CK: 69 U/L (ref 26–192)

## 2019-08-18 PROCEDURE — 83036 HEMOGLOBIN GLYCOSYLATED A1C: CPT

## 2019-08-18 PROCEDURE — 84133 ASSAY OF URINE POTASSIUM: CPT

## 2019-08-18 PROCEDURE — 82570 ASSAY OF URINE CREATININE: CPT

## 2019-08-18 PROCEDURE — 36415 COLL VENOUS BLD VENIPUNCTURE: CPT

## 2019-08-18 PROCEDURE — 84300 ASSAY OF URINE SODIUM: CPT

## 2019-08-18 PROCEDURE — 6370000000 HC RX 637 (ALT 250 FOR IP): Performed by: NURSE PRACTITIONER

## 2019-08-18 PROCEDURE — 2580000003 HC RX 258: Performed by: INTERNAL MEDICINE

## 2019-08-18 PROCEDURE — 93010 ELECTROCARDIOGRAM REPORT: CPT | Performed by: INTERNAL MEDICINE

## 2019-08-18 PROCEDURE — 6360000002 HC RX W HCPCS: Performed by: INTERNAL MEDICINE

## 2019-08-18 PROCEDURE — 82550 ASSAY OF CK (CPK): CPT

## 2019-08-18 PROCEDURE — 80048 BASIC METABOLIC PNL TOTAL CA: CPT

## 2019-08-18 PROCEDURE — 6370000000 HC RX 637 (ALT 250 FOR IP): Performed by: INTERNAL MEDICINE

## 2019-08-18 PROCEDURE — 6360000002 HC RX W HCPCS: Performed by: NURSE PRACTITIONER

## 2019-08-18 PROCEDURE — 82436 ASSAY OF URINE CHLORIDE: CPT

## 2019-08-18 PROCEDURE — 83874 ASSAY OF MYOGLOBIN: CPT

## 2019-08-18 PROCEDURE — 1200000000 HC SEMI PRIVATE

## 2019-08-18 RX ORDER — ONDANSETRON 2 MG/ML
4 INJECTION INTRAMUSCULAR; INTRAVENOUS EVERY 6 HOURS PRN
Status: DISCONTINUED | OUTPATIENT
Start: 2019-08-18 | End: 2019-08-24 | Stop reason: HOSPADM

## 2019-08-18 RX ORDER — OXYBUTYNIN CHLORIDE 5 MG/1
2.5 TABLET ORAL 2 TIMES DAILY
Status: DISCONTINUED | OUTPATIENT
Start: 2019-08-18 | End: 2019-08-24 | Stop reason: HOSPADM

## 2019-08-18 RX ORDER — ASPIRIN 81 MG/1
81 TABLET, CHEWABLE ORAL DAILY
Status: DISCONTINUED | OUTPATIENT
Start: 2019-08-18 | End: 2019-08-24 | Stop reason: HOSPADM

## 2019-08-18 RX ORDER — ATORVASTATIN CALCIUM 40 MG/1
40 TABLET, FILM COATED ORAL DAILY
Status: DISCONTINUED | OUTPATIENT
Start: 2019-08-18 | End: 2019-08-24 | Stop reason: HOSPADM

## 2019-08-18 RX ORDER — ACETAMINOPHEN 325 MG/1
650 TABLET ORAL EVERY 4 HOURS PRN
Status: DISCONTINUED | OUTPATIENT
Start: 2019-08-18 | End: 2019-08-24 | Stop reason: HOSPADM

## 2019-08-18 RX ORDER — SODIUM CHLORIDE 0.9 % (FLUSH) 0.9 %
10 SYRINGE (ML) INJECTION EVERY 12 HOURS SCHEDULED
Status: DISCONTINUED | OUTPATIENT
Start: 2019-08-18 | End: 2019-08-24 | Stop reason: HOSPADM

## 2019-08-18 RX ORDER — TRAMADOL HYDROCHLORIDE 50 MG/1
50 TABLET ORAL EVERY 4 HOURS PRN
Status: DISCONTINUED | OUTPATIENT
Start: 2019-08-18 | End: 2019-08-24 | Stop reason: HOSPADM

## 2019-08-18 RX ORDER — METOPROLOL TARTRATE 50 MG/1
50 TABLET, FILM COATED ORAL 2 TIMES DAILY
Status: DISCONTINUED | OUTPATIENT
Start: 2019-08-18 | End: 2019-08-24 | Stop reason: HOSPADM

## 2019-08-18 RX ORDER — SODIUM CHLORIDE 0.9 % (FLUSH) 0.9 %
10 SYRINGE (ML) INJECTION PRN
Status: DISCONTINUED | OUTPATIENT
Start: 2019-08-18 | End: 2019-08-24 | Stop reason: HOSPADM

## 2019-08-18 RX ORDER — AMLODIPINE BESYLATE 5 MG/1
10 TABLET ORAL DAILY
Status: DISCONTINUED | OUTPATIENT
Start: 2019-08-18 | End: 2019-08-20

## 2019-08-18 RX ORDER — HEPARIN SODIUM 5000 [USP'U]/ML
5000 INJECTION, SOLUTION INTRAVENOUS; SUBCUTANEOUS EVERY 8 HOURS SCHEDULED
Status: DISCONTINUED | OUTPATIENT
Start: 2019-08-18 | End: 2019-08-23

## 2019-08-18 RX ORDER — NICOTINE POLACRILEX 4 MG
15 LOZENGE BUCCAL PRN
Status: DISCONTINUED | OUTPATIENT
Start: 2019-08-18 | End: 2019-08-24 | Stop reason: HOSPADM

## 2019-08-18 RX ORDER — INSULIN GLARGINE 100 [IU]/ML
10 INJECTION, SOLUTION SUBCUTANEOUS DAILY
Status: DISCONTINUED | OUTPATIENT
Start: 2019-08-18 | End: 2019-08-24 | Stop reason: HOSPADM

## 2019-08-18 RX ORDER — SODIUM CHLORIDE 9 MG/ML
INJECTION, SOLUTION INTRAVENOUS CONTINUOUS
Status: DISPENSED | OUTPATIENT
Start: 2019-08-18 | End: 2019-08-20

## 2019-08-18 RX ORDER — DEXTROSE MONOHYDRATE 50 MG/ML
100 INJECTION, SOLUTION INTRAVENOUS PRN
Status: DISCONTINUED | OUTPATIENT
Start: 2019-08-18 | End: 2019-08-24 | Stop reason: HOSPADM

## 2019-08-18 RX ORDER — CIPROFLOXACIN 2 MG/ML
400 INJECTION, SOLUTION INTRAVENOUS EVERY 24 HOURS
Status: DISCONTINUED | OUTPATIENT
Start: 2019-08-18 | End: 2019-08-24 | Stop reason: HOSPADM

## 2019-08-18 RX ORDER — DEXTROSE MONOHYDRATE 25 G/50ML
12.5 INJECTION, SOLUTION INTRAVENOUS PRN
Status: DISCONTINUED | OUTPATIENT
Start: 2019-08-18 | End: 2019-08-24 | Stop reason: HOSPADM

## 2019-08-18 RX ADMIN — HEPARIN SODIUM 5000 UNITS: 5000 INJECTION INTRAVENOUS; SUBCUTANEOUS at 23:23

## 2019-08-18 RX ADMIN — OXYBUTYNIN CHLORIDE 2.5 MG: 5 TABLET ORAL at 08:36

## 2019-08-18 RX ADMIN — ATORVASTATIN CALCIUM 40 MG: 40 TABLET, FILM COATED ORAL at 08:36

## 2019-08-18 RX ADMIN — INSULIN GLARGINE 10 UNITS: 100 INJECTION, SOLUTION SUBCUTANEOUS at 09:23

## 2019-08-18 RX ADMIN — SODIUM CHLORIDE: 9 INJECTION, SOLUTION INTRAVENOUS at 19:00

## 2019-08-18 RX ADMIN — SODIUM CHLORIDE: 9 INJECTION, SOLUTION INTRAVENOUS at 00:56

## 2019-08-18 RX ADMIN — CIPROFLOXACIN 400 MG: 2 INJECTION, SOLUTION INTRAVENOUS at 11:38

## 2019-08-18 RX ADMIN — INSULIN LISPRO 2 UNITS: 100 INJECTION, SOLUTION INTRAVENOUS; SUBCUTANEOUS at 13:17

## 2019-08-18 RX ADMIN — METOPROLOL TARTRATE 50 MG: 50 TABLET ORAL at 23:22

## 2019-08-18 RX ADMIN — TRAMADOL HYDROCHLORIDE 50 MG: 50 TABLET, FILM COATED ORAL at 23:23

## 2019-08-18 RX ADMIN — ASPIRIN 81 MG 81 MG: 81 TABLET ORAL at 08:36

## 2019-08-18 RX ADMIN — SODIUM CHLORIDE: 9 INJECTION, SOLUTION INTRAVENOUS at 08:33

## 2019-08-18 RX ADMIN — ACETAMINOPHEN 650 MG: 325 TABLET ORAL at 05:43

## 2019-08-18 RX ADMIN — AMLODIPINE BESYLATE 10 MG: 5 TABLET ORAL at 08:37

## 2019-08-18 RX ADMIN — HEPARIN SODIUM 5000 UNITS: 5000 INJECTION INTRAVENOUS; SUBCUTANEOUS at 13:34

## 2019-08-18 RX ADMIN — Medication 10 ML: at 00:56

## 2019-08-18 RX ADMIN — METOPROLOL TARTRATE 50 MG: 50 TABLET ORAL at 08:36

## 2019-08-18 RX ADMIN — TRAMADOL HYDROCHLORIDE 50 MG: 50 TABLET, FILM COATED ORAL at 15:36

## 2019-08-18 RX ADMIN — OXYBUTYNIN CHLORIDE 2.5 MG: 5 TABLET ORAL at 23:22

## 2019-08-18 RX ADMIN — HEPARIN SODIUM 5000 UNITS: 5000 INJECTION INTRAVENOUS; SUBCUTANEOUS at 05:43

## 2019-08-18 RX ADMIN — METOPROLOL TARTRATE 50 MG: 50 TABLET ORAL at 00:56

## 2019-08-18 RX ADMIN — INSULIN LISPRO 1 UNITS: 100 INJECTION, SOLUTION INTRAVENOUS; SUBCUTANEOUS at 18:11

## 2019-08-18 RX ADMIN — OXYBUTYNIN CHLORIDE 2.5 MG: 5 TABLET ORAL at 00:56

## 2019-08-18 RX ADMIN — TRAMADOL HYDROCHLORIDE 50 MG: 50 TABLET, FILM COATED ORAL at 00:55

## 2019-08-18 ASSESSMENT — PAIN DESCRIPTION - LOCATION
LOCATION: RIB CAGE
LOCATION: RIB CAGE

## 2019-08-18 ASSESSMENT — PAIN DESCRIPTION - ORIENTATION
ORIENTATION: RIGHT
ORIENTATION: RIGHT

## 2019-08-18 ASSESSMENT — PAIN SCALES - GENERAL
PAINLEVEL_OUTOF10: 4
PAINLEVEL_OUTOF10: 9
PAINLEVEL_OUTOF10: 10
PAINLEVEL_OUTOF10: 8
PAINLEVEL_OUTOF10: 0
PAINLEVEL_OUTOF10: 5
PAINLEVEL_OUTOF10: 8

## 2019-08-18 ASSESSMENT — PAIN DESCRIPTION - DESCRIPTORS: DESCRIPTORS: SHARP

## 2019-08-18 ASSESSMENT — PAIN DESCRIPTION - PAIN TYPE
TYPE: ACUTE PAIN
TYPE: ACUTE PAIN

## 2019-08-18 ASSESSMENT — PAIN DESCRIPTION - PROGRESSION
CLINICAL_PROGRESSION: NOT CHANGED

## 2019-08-18 ASSESSMENT — PAIN DESCRIPTION - FREQUENCY: FREQUENCY: INTERMITTENT

## 2019-08-18 NOTE — ED PROVIDER NOTES
Emergency Department Provider Note  Location: [facility] C5 - MED SURG/ORTHO  8/17/2019     Patient Identification  Marcy Lyons is a 82 y.o. female    Chief Complaint  Rib Pain (states fell and broke one rib last week, states pain \" is too much\" , states took a tramadol at [address] and felt nauseated since then)      Mode of Arrival  private car    HPI  (History provided by patient and family member patient and son)  Patient is an 28-year-old female with non-insulin-dependent diabetes, who presents for reassessment for continued rib pain and generalized weakness. Patient was seen 4 days ago after reportedly mechanical fall while using a walker. Patient fell forward and onto her right side striking her rib cage on a couch. She presented was found to have a closed nondisplaced right-sided rib fracture. Patient was given tramadol and instructed to follow-up outpatient with primary physician. Patient reports she has had persistent and worsening pain. Family members at bedside states she has had decreased p.o. intake and generalized weakness to the point where she is unable to stand on her own now. Patient denies any chest pain other than the rib pain. Does report mild shortness of breath that she attributes to be secondary to the pain she is feeling. No loss of consciousness no further trauma. No leg pain leg swelling no history of DVT or PE. I have reviewed the following nursing documentation:  Allergies: Allergies   Allergen Reactions    Metformin And Related Other (See Comments)     diarrhea     Darvocet [Propoxyphene N-Acetaminophen] Nausea And Vomiting    Propoxyphene Nausea And Vomiting       Past medical history:  has a past medical history of DJD (degenerative joint disease), Eczema, Elevated LFTs, HBP (high blood pressure), Hyperlipidemia, NIDDM (non-insulin dependent diabetes mellitus), and Scoliosis.     Past surgical history:  has a past surgical history that includes Cholecystectomy; bladder suspension; Uterine Suspension; joint replacement (4/12/11); Tonsillectomy and adenoidectomy; Colonoscopy; and Colonoscopy (3/30/16). Home medications:   Prior to Admission medications    Medication Sig Start Date End Date Taking? Authorizing Provider   lidocaine 4 % external patch Place 1 patch onto the skin daily 8/25/19  Yes Jayshree Odell MD   Multiple Vitamins-Minerals (VISION VITAMINS PO) Take 1 tablet by mouth daily   Yes Historical Provider, MD   insulin glargine (TOUJEO SOLOSTAR) 300 UNIT/ML injection pen Inject 10 Units into the skin daily  Patient taking differently: Inject 12 Units into the skin daily  6/27/19  Yes REI Smith CNP   amLODIPine (NORVASC) 10 MG tablet Take 1 tablet by mouth daily 5/16/19  Yes Thais Connors MD   oxybutynin (DITROPAN) 5 MG tablet TAKE ONE TABLET BY MOUTH Two TIMES A DAY 5/8/19  Yes REI Smith CNP   canagliflozin [facility]) 100 MG TABS tablet Take 1 tablet by mouth daily 5/8/19  Yes REI Smith CNP   atorvastatin (LIPITOR) 40 MG tablet TAKE ONE TABLET BY MOUTH DAILY 5/8/19  Yes REI Smith CNP   aspirin 81 MG chewable tablet Take 1 tablet by mouth daily 3/28/19  Yes REI Barnhart CNP       Social history:  reports that she has never smoked. She has never used smokeless tobacco. She reports that she drinks alcohol. She reports that she does not use drugs. Family history:    Family History   Problem Relation Age of Onset    Cancer Mother     Heart Disease Father     Cancer Sister          ROS  Review of Systems   Constitutional: Positive for appetite change and fatigue. Negative for chills and fever. HENT: Negative for congestion and rhinorrhea. Eyes: Negative for photophobia and visual disturbance. Respiratory: Negative for cough, shortness of breath and wheezing. Cardiovascular: Positive for chest pain. Negative for palpitations.    Gastrointestinal: Negative for abdominal distention, difficile antigen and toxin  Normal Range: Negative     Urinalysis Reflex to Culture   Result Value Ref Range    Color, UA Yellow Straw/Yellow    Clarity, UA CLOUDY (A) Clear    Glucose, Ur 500 (A) Negative mg/dL    Bilirubin Urine Negative Negative    Ketones, Urine Negative Negative mg/dL    Specific Gravity, UA >=1.030 1.005 - 1.030    Blood, Urine MODERATE (A) Negative    pH, UA 5.0 5.0 - 8.0    Protein,  (A) Negative mg/dL    Urobilinogen, Urine 0.2 <2.0 E.U./dL    Nitrite, Urine Negative Negative    Leukocyte Esterase, Urine LARGE (A) Negative    Microscopic Examination YES     Urine Reflex to Culture Yes     Urine Type Not Specified    Basic Metabolic Panel w/ Reflex to MG   Result Value Ref Range    Sodium 127 (L) 136 - 145 mmol/L    Potassium reflex Magnesium 5.5 (H) 3.5 - 5.1 mmol/L    Chloride 89 (L) 99 - 110 mmol/L    CO2 23 21 - 32 mmol/L    Anion Gap 15 3 - 16    Glucose 200 (H) 70 - 99 mg/dL    BUN 66 (H) 7 - 20 mg/dL    CREATININE 2.5 (H) 0.6 - 1.2 mg/dL    GFR Non-African American 18 (A) >60    GFR  22 (A) >60    Calcium 9.4 8.3 - 10.6 mg/dL   CBC Auto Differential   Result Value Ref Range    WBC 16.0 (H) 4.0 - 11.0 K/uL    RBC 4.11 4.00 - 5.20 M/uL    Hemoglobin 11.5 (L) 12.0 - 16.0 g/dL    Hematocrit 34.6 (L) 36.0 - 48.0 %    MCV 84.2 80.0 - 100.0 fL    MCH 27.9 26.0 - 34.0 pg    MCHC 33.2 31.0 - 36.0 g/dL    RDW 14.0 12.4 - 15.4 %    Platelets 416 848 - 203 K/uL    MPV 9.7 5.0 - 10.5 fL    Neutrophils % 83.8 %    Lymphocytes % 7.3 %    Monocytes % 6.2 %    Eosinophils % 2.4 %    Basophils % 0.3 %    Neutrophils Absolute 13.4 (H) 1.7 - 7.7 K/uL    Lymphocytes Absolute 1.2 1.0 - 5.1 K/uL    Monocytes Absolute 1.0 0.0 - 1.3 K/uL    Eosinophils Absolute 0.4 0.0 - 0.6 K/uL    Basophils Absolute 0.1 0.0 - 0.2 K/uL   Troponin   Result Value Ref Range    Troponin <0.01 <0.01 ng/mL   Microscopic Urinalysis   Result Value Ref Range    WBC, UA >100 (A) 0 - 5 /HPF    RBC, UA see below 0 - 2 /HPF    Bacteria, UA 3+ (A) /HPF    Urinalysis Comments see below    Basic Metabolic Panel w/ Reflex to MG   Result Value Ref Range    Sodium 130 (L) 136 - 145 mmol/L    Potassium reflex Magnesium 4.7 3.5 - 5.1 mmol/L    Chloride 96 (L) 99 - 110 mmol/L    CO2 21 21 - 32 mmol/L    Anion Gap 13 3 - 16    Glucose 174 (H) 70 - 99 mg/dL    BUN 54 (H) 7 - 20 mg/dL    CREATININE 2.0 (H) 0.6 - 1.2 mg/dL    GFR Non-African American 24 (A) >60    GFR  29 (A) >60    Calcium 8.9 8.3 - 10.6 mg/dL   Creatinine, urine, random   Result Value Ref Range    Creatinine, Ur 21.2 (L) 28.0 - 259.0 mg/dL   Electrolytes Urine Random   Result Value Ref Range    Sodium, Ur 61 Not Established mmol/L    Potassium, Ur 16.8 Not Established mmol/L    Chloride 51 Not Established mmol/L   Myoglobin, urine   Result Value Ref Range    Myoglobin, Ur <1 0 - 1 mg/L   CK   Result Value Ref Range    Total CK 69 26 - 192 U/L   Hemoglobin A1C   Result Value Ref Range    Hemoglobin A1C 8.5 See comment %    eAG 197.3 mg/dL   Electrophoresis Protein, Serum without Reflex to Immunofixation   Result Value Ref Range    Total Protein 5.9 (L) 6.4 - 8.2 g/dL    Alb 2.3 (L) 3.1 - 4.9 g/dL    Alpha-1-Globulin 0.3 0.2 - 0.4 g/dL    Alpha-2-Globulin 1.1 0.4 - 1.1 g/dL    Beta Globulin 0.8 (L) 0.9 - 1.6 g/dL    Gamma Globulin 1.4 0.6 - 1.8 g/dL   [place]/Lambda Free Lt Chains, Serum Quant   Result Value Ref Range    KAPPA, FREE LIGHT CHAINS, SERUM 59.79 (H) 3.30 - 19.40 mg/L    LAMBDA, FREE LIGHT CHAINS, SERUM 39.29 (H) 5.71 - 26.30 mg/L    K/L RATIO 1.52 0.26 - 1.65    KAPPA/LAMBDA TEST COMMENT see below    CBC auto differential   Result Value Ref Range    WBC 11.4 (H) 4.0 - 11.0 K/uL    RBC 3.87 (L) 4.00 - 5.20 M/uL    Hemoglobin 10.7 (L) 12.0 - 16.0 g/dL    Hematocrit 33.5 (L) 36.0 - 48.0 %    MCV 86.6 80.0 - 100.0 fL    MCH 27.7 26.0 - 34.0 pg    MCHC 32.0 31.0 - 36.0 g/dL    RDW 14.2 12.4 - 15.4 %    Platelets 954 152 - 217 K/uL    MPV 9.5 5.0 -

## 2019-08-18 NOTE — ED NOTES
Bed: 02  Expected date:   Expected time:   Means of arrival:   Comments:  Leona Mayen RN  08/17/19 2042

## 2019-08-18 NOTE — H&P
Hospital Medicine History & Physical      PCP: REI Richter CNP    Date of Admission: 8/17/2019    Date of Service: Pt seen/examined on 8/17/2019 and Admitted to Inpatient with expected LOS greater than two midnights due to medical therapy. Chief Complaint:  Rib pain      History Of Present Illness:    82 y.o. female who presented to [facility] with right rib pain  Patient presented to the ED today with complaints of ongoing right chest pain. She had sustained a fall 4 days ago and was found to have a right rib fracture. She was given incentive spirometer and tramadol and discharged from the ED. Patient reports she continues to have right-sided rib pain that is not well controlled at home. Family present with her also reported that she has not been eating or drinking much. The family wanted her to be reevaluated so they brought her to the ED. Patient apparently lives alone by herself, with aides coming in a few times a day. With the family members are contemplating placing her in extended care facility. Patient denies any other complaints apart from the right-sided chest pain. She denies any subjective fevers chills or rigors, denies abdominal pain nausea vomiting or diarrhea. Past Medical History:          Diagnosis Date    DJD (degenerative joint disease)     Eczema     Elevated LFTs     HBP (high blood pressure)     Hyperlipidemia     NIDDM (non-insulin dependent diabetes mellitus)     Scoliosis        Past Surgical History:          Procedure Laterality Date    BLADDER SUSPENSION      CHOLECYSTECTOMY      COLONOSCOPY      COLONOSCOPY  3/30/16    tics    JOINT REPLACEMENT  4/12/11    rigth total hip replacement with cell saver and platelet gel.  TONSILLECTOMY AND ADENOIDECTOMY      UTERINE SUSPENSION         Medications Prior to Admission:      Prior to Admission medications    Medication Sig Start Date End Date Taking?  Authorizing Provider   Multiple

## 2019-08-19 ENCOUNTER — APPOINTMENT (OUTPATIENT)
Dept: ULTRASOUND IMAGING | Age: 84
DRG: 683 | End: 2019-08-19
Payer: MEDICARE

## 2019-08-19 PROBLEM — E44.0 MODERATE PROTEIN-CALORIE MALNUTRITION (HCC): Status: ACTIVE | Noted: 2019-08-19

## 2019-08-19 LAB
C DIFF TOXIN/ANTIGEN: NORMAL
ESTIMATED AVERAGE GLUCOSE: 197.3 MG/DL
GLUCOSE BLD-MCNC: 114 MG/DL (ref 70–99)
GLUCOSE BLD-MCNC: 130 MG/DL (ref 70–99)
GLUCOSE BLD-MCNC: 88 MG/DL (ref 70–99)
GLUCOSE BLD-MCNC: 94 MG/DL (ref 70–99)
HBA1C MFR BLD: 8.5 %
PERFORMED ON: ABNORMAL
PERFORMED ON: ABNORMAL
PERFORMED ON: NORMAL
PERFORMED ON: NORMAL
URINE CULTURE, ROUTINE: NORMAL

## 2019-08-19 PROCEDURE — 87324 CLOSTRIDIUM AG IA: CPT

## 2019-08-19 PROCEDURE — 97535 SELF CARE MNGMENT TRAINING: CPT

## 2019-08-19 PROCEDURE — 84155 ASSAY OF PROTEIN SERUM: CPT

## 2019-08-19 PROCEDURE — 87505 NFCT AGENT DETECTION GI: CPT

## 2019-08-19 PROCEDURE — 6360000002 HC RX W HCPCS: Performed by: NURSE PRACTITIONER

## 2019-08-19 PROCEDURE — 94761 N-INVAS EAR/PLS OXIMETRY MLT: CPT

## 2019-08-19 PROCEDURE — 1200000000 HC SEMI PRIVATE

## 2019-08-19 PROCEDURE — 87046 STOOL CULTR AEROBIC BACT EA: CPT

## 2019-08-19 PROCEDURE — 2580000003 HC RX 258: Performed by: INTERNAL MEDICINE

## 2019-08-19 PROCEDURE — 97166 OT EVAL MOD COMPLEX 45 MIN: CPT

## 2019-08-19 PROCEDURE — 6370000000 HC RX 637 (ALT 250 FOR IP)

## 2019-08-19 PROCEDURE — 97530 THERAPEUTIC ACTIVITIES: CPT

## 2019-08-19 PROCEDURE — 84165 PROTEIN E-PHORESIS SERUM: CPT

## 2019-08-19 PROCEDURE — 6370000000 HC RX 637 (ALT 250 FOR IP): Performed by: INTERNAL MEDICINE

## 2019-08-19 PROCEDURE — 87449 NOS EACH ORGANISM AG IA: CPT

## 2019-08-19 PROCEDURE — 76770 US EXAM ABDO BACK WALL COMP: CPT

## 2019-08-19 PROCEDURE — 36415 COLL VENOUS BLD VENIPUNCTURE: CPT

## 2019-08-19 PROCEDURE — 97116 GAIT TRAINING THERAPY: CPT

## 2019-08-19 PROCEDURE — 2700000000 HC OXYGEN THERAPY PER DAY

## 2019-08-19 PROCEDURE — 83883 ASSAY NEPHELOMETRY NOT SPEC: CPT

## 2019-08-19 PROCEDURE — 6360000002 HC RX W HCPCS: Performed by: INTERNAL MEDICINE

## 2019-08-19 PROCEDURE — 97162 PT EVAL MOD COMPLEX 30 MIN: CPT

## 2019-08-19 RX ADMIN — HEPARIN SODIUM 5000 UNITS: 5000 INJECTION INTRAVENOUS; SUBCUTANEOUS at 22:32

## 2019-08-19 RX ADMIN — SODIUM CHLORIDE: 9 INJECTION, SOLUTION INTRAVENOUS at 21:01

## 2019-08-19 RX ADMIN — ASPIRIN 81 MG 81 MG: 81 TABLET ORAL at 09:18

## 2019-08-19 RX ADMIN — SODIUM CHLORIDE: 9 INJECTION, SOLUTION INTRAVENOUS at 01:47

## 2019-08-19 RX ADMIN — TRAMADOL HYDROCHLORIDE 50 MG: 50 TABLET, FILM COATED ORAL at 23:37

## 2019-08-19 RX ADMIN — Medication: at 17:03

## 2019-08-19 RX ADMIN — METOPROLOL TARTRATE 50 MG: 50 TABLET ORAL at 09:21

## 2019-08-19 RX ADMIN — TRAMADOL HYDROCHLORIDE 50 MG: 50 TABLET, FILM COATED ORAL at 06:30

## 2019-08-19 RX ADMIN — HEPARIN SODIUM 5000 UNITS: 5000 INJECTION INTRAVENOUS; SUBCUTANEOUS at 06:12

## 2019-08-19 RX ADMIN — ATORVASTATIN CALCIUM 40 MG: 40 TABLET, FILM COATED ORAL at 09:19

## 2019-08-19 RX ADMIN — SODIUM CHLORIDE: 9 INJECTION, SOLUTION INTRAVENOUS at 10:40

## 2019-08-19 RX ADMIN — METOPROLOL TARTRATE 50 MG: 50 TABLET ORAL at 21:02

## 2019-08-19 RX ADMIN — CIPROFLOXACIN 400 MG: 2 INJECTION, SOLUTION INTRAVENOUS at 11:46

## 2019-08-19 RX ADMIN — INSULIN GLARGINE 10 UNITS: 100 INJECTION, SOLUTION SUBCUTANEOUS at 09:18

## 2019-08-19 RX ADMIN — OXYBUTYNIN CHLORIDE 2.5 MG: 5 TABLET ORAL at 09:18

## 2019-08-19 RX ADMIN — OXYBUTYNIN CHLORIDE 2.5 MG: 5 TABLET ORAL at 21:02

## 2019-08-19 RX ADMIN — ONDANSETRON 4 MG: 2 INJECTION INTRAMUSCULAR; INTRAVENOUS at 13:11

## 2019-08-19 RX ADMIN — TRAMADOL HYDROCHLORIDE 50 MG: 50 TABLET, FILM COATED ORAL at 12:04

## 2019-08-19 RX ADMIN — HEPARIN SODIUM 5000 UNITS: 5000 INJECTION INTRAVENOUS; SUBCUTANEOUS at 14:43

## 2019-08-19 RX ADMIN — AMLODIPINE BESYLATE 10 MG: 5 TABLET ORAL at 09:18

## 2019-08-19 RX ADMIN — TRAMADOL HYDROCHLORIDE 50 MG: 50 TABLET, FILM COATED ORAL at 18:54

## 2019-08-19 ASSESSMENT — PAIN DESCRIPTION - FREQUENCY
FREQUENCY: INTERMITTENT
FREQUENCY: CONTINUOUS
FREQUENCY: INTERMITTENT

## 2019-08-19 ASSESSMENT — PAIN DESCRIPTION - PAIN TYPE
TYPE: ACUTE PAIN

## 2019-08-19 ASSESSMENT — PAIN SCALES - GENERAL
PAINLEVEL_OUTOF10: 7
PAINLEVEL_OUTOF10: 6
PAINLEVEL_OUTOF10: 8
PAINLEVEL_OUTOF10: 9
PAINLEVEL_OUTOF10: 5
PAINLEVEL_OUTOF10: 0
PAINLEVEL_OUTOF10: 6
PAINLEVEL_OUTOF10: 4

## 2019-08-19 ASSESSMENT — PAIN DESCRIPTION - ORIENTATION
ORIENTATION: RIGHT

## 2019-08-19 ASSESSMENT — PAIN DESCRIPTION - LOCATION
LOCATION: RIB CAGE

## 2019-08-19 ASSESSMENT — PAIN DESCRIPTION - DESCRIPTORS: DESCRIPTORS: SHARP

## 2019-08-19 NOTE — PROGRESS NOTES
distressed and unable to keep much detailed history. She is  being managed conservatively for the rib fracture. We are being consulted for acute kidney injury.     Recent NSAID- no   PPI's- no  high dose vitamin C intake- no  intake of star fruits,herbal supplements, star fruits- No  uncontrolled glucose levels-no  uncontrolled hypertension- no  Hypotension- no  heart issues- no  diarrhea, vomiting- no  contrast studies- no  symptoms of urinary retention- no  exposure to new drugs, sepsis- no  changes in medicine known to affect renal function directly-was listed to be on lisinopril         Interval History:     Has alona    ROS:     Seen with-no family    Unable to obtain ROS due to  Altered mental status/altered but does not recall details-said that she has pain in the rib fracture site         Medication:     Scheduled Meds:   amLODIPine  10 mg Oral Daily    aspirin  81 mg Oral Daily    atorvastatin  40 mg Oral Daily    insulin glargine  10 Units Subcutaneous Daily    metoprolol tartrate  50 mg Oral BID    oxybutynin  2.5 mg Oral BID    sodium chloride flush  10 mL Intravenous 2 times per day    heparin (porcine)  5,000 Units Subcutaneous 3 times per day    ciprofloxacin  400 mg Intravenous Q24H    insulin lispro  0-6 Units Subcutaneous TID WC    insulin lispro  0-3 Units Subcutaneous Nightly    lidocaine  1 patch Transdermal Daily     Continuous Infusions:   sodium chloride 125 mL/hr at 08/19/19 0147    dextrose       PRN Meds:.traMADol, sodium chloride flush, magnesium hydroxide, ondansetron, acetaminophen, glucose, dextrose, glucagon (rDNA), dextrose       Vitals :     Vitals:    08/19/19 0845   BP:    Pulse:    Resp:    Temp:    SpO2: 94%       I & O :       Intake/Output Summary (Last 24 hours) at 8/19/2019 1012  Last data filed at 8/19/2019 0924  Gross per 24 hour   Intake 3085.42 ml   Output 2400 ml   Net 685.42 ml        Physical Examination :     General appearance: Anxious- no, distressed- no, in good spirits- yes  Sitting up on chair  HEENT: Lips- normal, teeth- ok , oral mucosa- moist  Neck : Mass- no, appears symmetrical, JVD- not visible  Respiratory: Respiratory effort-normal, wheeze- no, crackles - no, tenderness in the rib site  Cardiovascular:  Ausculation- No M/R/G, Edema none  Abdomen: visible mass- no, distention- no, scar- no, tenderness- no                            hepatosplenomegaly-  no  Musculoskeletal:  clubbing no,cyanosis- no , digital ischemia- no                           muscle strength- grossly normal , tone - grossly normal  Skin: rashes- no , ulcers- no, induration- no, tightening - no  Psychiatric:  Judgement and insight- normal           AAO X 3  Has sage     LABS:     Recent Labs     08/17/19 2159   WBC 16.0*   HGB 11.5*   HCT 34.6*        Recent Labs     08/17/19 2159 08/18/19  0749   * 130*   K 5.5* 4.7   CL 89* 96*   CO2 23 21   BUN 66* 54*   CREATININE 2.5* 2.0*   GLUCOSE 200* 174*

## 2019-08-19 NOTE — PROGRESS NOTES
Occupational Therapy   Occupational Therapy Initial Assessment/Treatment  Date: 2019   Patient Name: Selma Goodpasture  MRN: [id number]     : 1935    Date of Service: 2019    Discharge Recommendations:  Subacute/Skilled Nursing Facility       Assessment   Performance deficits / Impairments: Decreased functional mobility ; Decreased ADL status; Decreased endurance;Decreased strength;Decreased balance;Decreased safe awareness  After evaluation, pt found to be presenting with the above mentioned occupational performance deficits which are affecting participation in daily living skills. Pt would benefit from continued skilled occupational therapy to address ADLs, functional mobility, and safety while in acute care. Prognosis: Good  Decision Making: Medium Complexity  OT Education: OT Role;Plan of Care;Orientation;IADL Safety;Transfer Training;ADL Adaptive Strategies;Precautions  REQUIRES OT FOLLOW UP: Yes  Activity Tolerance  Activity Tolerance: Patient Tolerated treatment well;Patient limited by pain  Safety Devices  Safety Devices in place: Yes  Type of devices: Gait belt; Chair alarm in place;Call light within reach; Left in chair;Nurse notified           Patient Diagnosis(es): The primary encounter diagnosis was OSCAR (acute kidney injury) ([facility] [address] A diagnosis of Acute cystitis without hematuria was also pertinent to this visit. has a past medical history of DJD (degenerative joint disease), Eczema, Elevated LFTs, HBP (high blood pressure), Hyperlipidemia, NIDDM (non-insulin dependent diabetes mellitus), and Scoliosis. has a past surgical history that includes Cholecystectomy; bladder suspension; Uterine Suspension; joint replacement (11); Tonsillectomy and adenoidectomy; Colonoscopy; and Colonoscopy (3/30/16).            Restrictions  Restrictions/Precautions  Restrictions/Precautions: Up as Tolerated, General Precautions, Contact Precautions, Isolation  Position Activity Restriction  Other position/activity restrictions: 2L O2 NC, Wade, R 8th Rib fx    Subjective   General  Chart Reviewed: Yes  Patient assessed for rehabilitation services?: Yes  Family / Caregiver Present: No  Referring Practitioner: REI Tomas CNP 8/18/19  Diagnosis:  OSCAR, recurrent UTI  Subjective  Subjective: Pt resting in bed upon entry, pleasantly confused, but agreeable to OT evaluation. Patient Currently in Pain: Yes  Pain Assessment  Pain Assessment: 0-10  Pain Level: 6  Pain Type: Acute pain  Pain Location: Rib cage  Pain Orientation: Right  Pain Descriptors: Sharp(w/ movement)  Pain Frequency: Intermittent  Non-Pharmaceutical Pain Intervention(s): Ambulation/Increased Activity; Therapeutic presence  Response to Pain Intervention: Patient Satisfied  Pre Treatment Pain Screening  Intervention List: Patient able to continue with treatment  Vital Signs  Patient Currently in Pain: Yes  Social/Functional History  Social/Functional History  Lives With: Alone  Type of Home: Apartment([facility], senior living condo)  Home Layout: One level, Laundry in basement(one level with basement)  Home Access: Stairs to enter without rails  Entrance Stairs - Number of Steps: 1  Bathroom Shower/Tub: Walk-in shower  Bathroom Toilet: Handicap height  Bathroom Equipment: Built-in shower seat, Hand-held shower, Grab bars in shower, Grab bars around toilet  Home Equipment: 4 wheeled walker, Alert Button  ADL Assistance: Independent  Homemaking Assistance: Independent  Homemaking Responsibilities: Yes  Ambulation Assistance: Independent(MI with [id number])  Transfer Assistance: Independent(MI with 8CN)  Active : Yes  Occupation: Retired  Additional Comments: Pt is a questionable historian       Objective         Balance  Sitting Balance: Supervision  Standing Balance: Contact guard assistance(with RW for UE support)    Functional Mobility  Functional - Mobility Device: Rolling Walker  Activity: To/from bathroom  Assist Level: Contact guard assistance  Functional Mobility Comments: increased time and breaks d/t rib pain    Toilet Transfers  Toilet - Technique: Ambulating(w/ RW)  Equipment Used: Standard toilet(R grab bar)  Toilet Transfer: Contact guard assistance  Toilet Transfers Comments: patient needs frequent cues to stay on task. ADL  LE Dressing: Maximum assistance  Toileting: Supervision        Bed mobility  Supine to Sit: Minimal assistance  Sit to Supine: Unable to assess(up to chair)  Transfers  Sit to stand: Contact guard assistance(up to RW)  Stand to sit: Contact guard assistance     Cognition  Arousal/Alertness: Delayed responses to stimuli  Following Commands: Follows one step commands with repetition; Follows one step commands with increased time  Attention Span: Difficulty attending to directions; Attends with cues to redirect  Memory: Decreased short term memory  Insights: Decreased awareness of deficits  Initiation: Requires cues for some  Sequencing: Requires cues for some     LUE AROM (degrees)  LUE AROM : WFL  RUE AROM (degrees)  RUE AROM : WFL  LUE Strength  Gross LUE Strength: (4-/5 grossly)  RUE Strength  Gross RUE Strength: (4-/5 grossly)      Plan   Plan  Times per week: 3-5x's a week while in acute care             AM-PAC Score        AM-[facility] Inpatient Daily Activity Raw Score: 16 (08/19/19 1757)  -PAC Inpatient ADL T-Scale Score : 35.96 (08/19/19 1757)  ADL Inpatient CMS 0-100% Score: 53.32 (08/19/19 1757)  ADL Inpatient CMS G-Code Modifier : CK (08/19/19 1757)    Goals  Short term goals  Time Frame for Short term goals: 1 week unless otherwise specified 8/26  Short term goal 1: Pt will complete LE dressing with SBA  Short term goal 2: Pt will complete toilet transfesr with SBA by 8/24  Short term goal 3: Pt will complete standing level ADLs with SBA for balance  Patient Goals   Patient goals : \"to get better\"       Therapy Time   Individual Concurrent Group Co-treatment   Time In [address] [place] [address] Road         Time Out 1700

## 2019-08-19 NOTE — PROGRESS NOTES
Chang Lester  NP was informed of distention bladder scan pt with white beige urine and daughter states pt has had retention in past states to place a sage

## 2019-08-19 NOTE — PROGRESS NOTES
prerenal due to decreased by mouth intake as well as GI losses +/- UTI and lisinopril  Workup - BUN/CR 66/2.5, Baseline creatinine not clear ,was 1.1 in March 19  Plan - IV fluids with isotonic saline infusion, urine lites, urine creatinine, CK          - Recheck renal panel in a.m.          - Avoid nephrotoxic medications, hold lisinopril          - Consulted nephrology  - renal u/s ordered    Right rib pain- due to recent rib fracture, no pnx noted, likely due to fall 4 days PTA(was tx'd in ER at the time)  -supportive care    recurrent UTI, previous enterococcus sens to cipro (no sens to Rocephin)    - DC iv rocephin, start cipro q24 (renal dose)  - f/u urine cultures noted <50k mixed xiomara     Essential hypertension - controlled resume home medications and monitor BP, hold ACEi     Type 2 diabetes mellitus - last A1C 9.1, resume lantus, started on SSI with Accu-Cheks   -a1c 8.5 here    Mixed hyperlipidemia - resume statin     Dementia - family contemplating placement in ECF, consulted CM/SW, PT/OT      DVT Prophylaxis: heparin sq  Diet: DIET CARB CONTROL;  Code Status: Full Code    PT/OT Eval Status: ordered 8/18    Dispo - pending renal u/s    Reba Clancy MD

## 2019-08-19 NOTE — PLAN OF CARE
Problem: Falls - Risk of:  Goal: Will remain free from falls  Description  Will remain free from falls  8/19/2019 1122 by Dalton Alonos RN  Outcome: Ongoing     Problem: Pain:  Goal: Pain level will decrease  Description  Pain level will decrease  Outcome: Ongoing

## 2019-08-20 LAB
ALBUMIN SERPL-MCNC: 2.9 G/DL (ref 3.4–5)
ANION GAP SERPL CALCULATED.3IONS-SCNC: 10 MMOL/L (ref 3–16)
BASOPHILS ABSOLUTE: 0.1 K/UL (ref 0–0.2)
BASOPHILS RELATIVE PERCENT: 0.4 %
BUN BLDV-MCNC: 15 MG/DL (ref 7–20)
CALCIUM SERPL-MCNC: 8.7 MG/DL (ref 8.3–10.6)
CHLORIDE BLD-SCNC: 108 MMOL/L (ref 99–110)
CO2: 23 MMOL/L (ref 21–32)
CREAT SERPL-MCNC: 1.1 MG/DL (ref 0.6–1.2)
EOSINOPHILS ABSOLUTE: 0.6 K/UL (ref 0–0.6)
EOSINOPHILS RELATIVE PERCENT: 5.3 %
GFR AFRICAN AMERICAN: 57
GFR NON-AFRICAN AMERICAN: 48
GI BACTERIAL PATHOGENS BY PCR: NORMAL
GLUCOSE BLD-MCNC: 114 MG/DL (ref 70–99)
GLUCOSE BLD-MCNC: 144 MG/DL (ref 70–99)
GLUCOSE BLD-MCNC: 147 MG/DL (ref 70–99)
GLUCOSE BLD-MCNC: 182 MG/DL (ref 70–99)
GLUCOSE BLD-MCNC: 97 MG/DL (ref 70–99)
HCT VFR BLD CALC: 33.5 % (ref 36–48)
HEMOGLOBIN: 10.7 G/DL (ref 12–16)
LYMPHOCYTES ABSOLUTE: 1.3 K/UL (ref 1–5.1)
LYMPHOCYTES RELATIVE PERCENT: 11.8 %
MAGNESIUM: 2 MG/DL (ref 1.8–2.4)
MCH RBC QN AUTO: 27.7 PG (ref 26–34)
MCHC RBC AUTO-ENTMCNC: 32 G/DL (ref 31–36)
MCV RBC AUTO: 86.6 FL (ref 80–100)
MONOCYTES ABSOLUTE: 0.8 K/UL (ref 0–1.3)
MONOCYTES RELATIVE PERCENT: 7.3 %
MYOGLOBIN URINE: <1 MG/L (ref 0–1)
NEUTROPHILS ABSOLUTE: 8.6 K/UL (ref 1.7–7.7)
NEUTROPHILS RELATIVE PERCENT: 75.2 %
PDW BLD-RTO: 14.2 % (ref 12.4–15.4)
PERFORMED ON: ABNORMAL
PERFORMED ON: NORMAL
PHOSPHORUS: 2.7 MG/DL (ref 2.5–4.9)
PLATELET # BLD: 261 K/UL (ref 135–450)
PMV BLD AUTO: 9.5 FL (ref 5–10.5)
POTASSIUM SERPL-SCNC: 4.1 MMOL/L (ref 3.5–5.1)
RBC # BLD: 3.87 M/UL (ref 4–5.2)
SODIUM BLD-SCNC: 141 MMOL/L (ref 136–145)
WBC # BLD: 11.4 K/UL (ref 4–11)

## 2019-08-20 PROCEDURE — 97110 THERAPEUTIC EXERCISES: CPT

## 2019-08-20 PROCEDURE — 6360000002 HC RX W HCPCS: Performed by: NURSE PRACTITIONER

## 2019-08-20 PROCEDURE — 97530 THERAPEUTIC ACTIVITIES: CPT

## 2019-08-20 PROCEDURE — 80069 RENAL FUNCTION PANEL: CPT

## 2019-08-20 PROCEDURE — 2580000003 HC RX 258: Performed by: INTERNAL MEDICINE

## 2019-08-20 PROCEDURE — 2580000003 HC RX 258

## 2019-08-20 PROCEDURE — 94761 N-INVAS EAR/PLS OXIMETRY MLT: CPT

## 2019-08-20 PROCEDURE — 6360000002 HC RX W HCPCS: Performed by: INTERNAL MEDICINE

## 2019-08-20 PROCEDURE — 6370000000 HC RX 637 (ALT 250 FOR IP): Performed by: NURSE PRACTITIONER

## 2019-08-20 PROCEDURE — 97116 GAIT TRAINING THERAPY: CPT

## 2019-08-20 PROCEDURE — 2700000000 HC OXYGEN THERAPY PER DAY

## 2019-08-20 PROCEDURE — 97535 SELF CARE MNGMENT TRAINING: CPT

## 2019-08-20 PROCEDURE — 83735 ASSAY OF MAGNESIUM: CPT

## 2019-08-20 PROCEDURE — 85025 COMPLETE CBC W/AUTO DIFF WBC: CPT

## 2019-08-20 PROCEDURE — 1200000000 HC SEMI PRIVATE

## 2019-08-20 PROCEDURE — 36415 COLL VENOUS BLD VENIPUNCTURE: CPT

## 2019-08-20 PROCEDURE — 6370000000 HC RX 637 (ALT 250 FOR IP): Performed by: INTERNAL MEDICINE

## 2019-08-20 RX ORDER — AMLODIPINE BESYLATE 5 MG/1
5 TABLET ORAL DAILY
Status: DISCONTINUED | OUTPATIENT
Start: 2019-08-21 | End: 2019-08-24 | Stop reason: HOSPADM

## 2019-08-20 RX ORDER — SODIUM CHLORIDE 9 MG/ML
INJECTION, SOLUTION INTRAVENOUS
Status: COMPLETED
Start: 2019-08-20 | End: 2019-08-20

## 2019-08-20 RX ADMIN — Medication 10 ML: at 20:55

## 2019-08-20 RX ADMIN — INSULIN LISPRO 1 UNITS: 100 INJECTION, SOLUTION INTRAVENOUS; SUBCUTANEOUS at 18:13

## 2019-08-20 RX ADMIN — OXYBUTYNIN CHLORIDE 2.5 MG: 5 TABLET ORAL at 09:26

## 2019-08-20 RX ADMIN — METOPROLOL TARTRATE 50 MG: 50 TABLET ORAL at 20:55

## 2019-08-20 RX ADMIN — ASPIRIN 81 MG 81 MG: 81 TABLET ORAL at 09:27

## 2019-08-20 RX ADMIN — HEPARIN SODIUM 5000 UNITS: 5000 INJECTION INTRAVENOUS; SUBCUTANEOUS at 20:55

## 2019-08-20 RX ADMIN — METOPROLOL TARTRATE 50 MG: 50 TABLET ORAL at 09:26

## 2019-08-20 RX ADMIN — ATORVASTATIN CALCIUM 40 MG: 40 TABLET, FILM COATED ORAL at 09:26

## 2019-08-20 RX ADMIN — Medication 10 ML: at 06:20

## 2019-08-20 RX ADMIN — AMLODIPINE BESYLATE 10 MG: 5 TABLET ORAL at 09:26

## 2019-08-20 RX ADMIN — Medication 10 ML: at 09:28

## 2019-08-20 RX ADMIN — HEPARIN SODIUM 5000 UNITS: 5000 INJECTION INTRAVENOUS; SUBCUTANEOUS at 05:46

## 2019-08-20 RX ADMIN — TRAMADOL HYDROCHLORIDE 50 MG: 50 TABLET, FILM COATED ORAL at 17:04

## 2019-08-20 RX ADMIN — CIPROFLOXACIN 400 MG: 2 INJECTION, SOLUTION INTRAVENOUS at 12:50

## 2019-08-20 RX ADMIN — SODIUM CHLORIDE: 900 INJECTION, SOLUTION INTRAVENOUS at 13:15

## 2019-08-20 RX ADMIN — INSULIN LISPRO 1 UNITS: 100 INJECTION, SOLUTION INTRAVENOUS; SUBCUTANEOUS at 13:02

## 2019-08-20 RX ADMIN — OXYBUTYNIN CHLORIDE 2.5 MG: 5 TABLET ORAL at 20:54

## 2019-08-20 RX ADMIN — TRAMADOL HYDROCHLORIDE 50 MG: 50 TABLET, FILM COATED ORAL at 05:29

## 2019-08-20 RX ADMIN — HEPARIN SODIUM 5000 UNITS: 5000 INJECTION INTRAVENOUS; SUBCUTANEOUS at 15:11

## 2019-08-20 ASSESSMENT — PAIN SCALES - WONG BAKER
WONGBAKER_NUMERICALRESPONSE: 4

## 2019-08-20 ASSESSMENT — PAIN DESCRIPTION - PAIN TYPE
TYPE: ACUTE PAIN

## 2019-08-20 ASSESSMENT — PAIN DESCRIPTION - ORIENTATION
ORIENTATION: RIGHT

## 2019-08-20 ASSESSMENT — PAIN DESCRIPTION - LOCATION
LOCATION: RIB CAGE

## 2019-08-20 ASSESSMENT — PAIN SCALES - GENERAL
PAINLEVEL_OUTOF10: 5
PAINLEVEL_OUTOF10: 6
PAINLEVEL_OUTOF10: 4
PAINLEVEL_OUTOF10: 5

## 2019-08-20 ASSESSMENT — PAIN DESCRIPTION - FREQUENCY: FREQUENCY: INTERMITTENT

## 2019-08-20 ASSESSMENT — PAIN DESCRIPTION - DESCRIPTORS: DESCRIPTORS: SHARP

## 2019-08-20 NOTE — PROGRESS NOTES
non-tender, non-distended with normal bowel sounds. Musculoskeletal:  No clubbing, cyanosis or edema bilaterally.  Full range of motion without deformity. Right chest tenderness along the midaxillary line at the eighth rib level  Skin: Skin color, texture normal.  No rashes or lesions.  Decreased skin turgor  Neurologic:  Neurovascularly intact without any focal sensory/motor deficits. Cranial nerves: II-XII intact, grossly non-focal.  Psychiatric:  Alert and oriented, thought content appropriate, normal insight  Capillary Refill: Brisk,< 3 seconds   Peripheral Pulses: +2 palpable, equal bilaterally       Labs:   Recent Labs     08/17/19 2159   WBC 16.0*   HGB 11.5*   HCT 34.6*        Recent Labs     08/17/19 2159 08/18/19  0749   * 130*   K 5.5* 4.7   CL 89* 96*   CO2 23 21   BUN 66* 54*   CREATININE 2.5* 2.0*   CALCIUM 9.4 8.9     No results for input(s): AST, ALT, BILIDIR, BILITOT, ALKPHOS in the last 72 hours. No results for input(s): INR in the last 72 hours. Recent Labs     08/17/19 2159 08/18/19  0749   CKTOTAL  --  69   TROPONINI <0.01  --        Urinalysis:      Lab Results   Component Value Date    NITRU Negative 08/17/2019    WBCUA >100 08/17/2019    BACTERIA 3+ 08/17/2019    RBCUA see below 08/17/2019    BLOODU MODERATE 08/17/2019    SPECGRAV >=1.030 08/17/2019    GLUCOSEU 500 08/17/2019    GLUCOSEU NEGATIVE 04/01/2011       Radiology:  US RENAL COMPLETE   Final Result   Unremarkable ultrasound of the kidneys and urinary bladder. XR CHEST STANDARD (2 VW)   Final Result   No acute abnormality identified.                  Assessment/Plan:    Active Hospital Problems    Diagnosis    HTN (hypertension), benign [I10]     Priority: Medium    Moderate protein-calorie malnutrition (HCC) [E44.0]    OSCAR (acute kidney injury) ([facility] [address] [N17.9]    Vascular dementia with behavior disturbance [F01.51]    Type 2 diabetes mellitus without complication ([facility] [address] [Q25.7]    Mixed hyperlipidemia

## 2019-08-20 NOTE — PLAN OF CARE
Protect patient from fall injury by keeping bed in low position, use of non skin socks and bed alarm system. Keep belongings and call light with reach at all times and following fall protocol. Family request we keep 4 siderails up on bed. Encourage patient to ask for pain medication before pain is above a 5 on the 1/10 pain scale. Medicate before PT or increased activity. Use alternatives such as ice (if ordered) or distraction as often as possible to minimize  need for medication. Continue helping patient with turns. Try to time turns with good pain relief. Patient has had diarrhea, check diaper every 2 hours and use protective barrier. Make sure sage tube is not indenting skin. Float heels off bed and monitor for red areas.

## 2019-08-20 NOTE — PROGRESS NOTES
Physical Therapy  Facility/Department: [facility] C5 - MED SURG/ORTHO  Daily Treatment Note  NAME: Gina Duffy  : 1935  MRN: [id number]    Date of Service: 2019    Discharge Recommendations:  Subacute/Skilled Nursing Facility   PT Equipment Recommendations  Equipment Needed: No    Assessment   Body structures, Functions, Activity limitations: Decreased functional mobility ; Decreased safe awareness;Decreased balance;Decreased cognition; Increased Pain;Decreased strength  Assessment: Pt demonstrates improved performance with gait this session and improved gait pattern. Pt requires Salima to stand from lower chair and CGA from EOB. Pt continues to function below baseline and will benefit from continued skilled PT while in acute care setting to address above deficits. Treatment Diagnosis: Decreased functional mobility  Prognosis: Good  Decision Making: Medium Complexity  PT Education: Goals; Functional Mobility Training;Orientation;PT Role;Transfer Training;Plan of Care;Gait Training;General Safety  Patient Education: Pt requires re-inforcement  Barriers to Learning: Cognition  REQUIRES PT FOLLOW UP: Yes  Activity Tolerance  Activity Tolerance: Patient Tolerated treatment well;Patient limited by fatigue;Patient limited by pain  Activity Tolerance: Pt on RA throughout session, fatigue noted with gait     Patient Diagnosis(es): The primary encounter diagnosis was OSCAR (acute kidney injury) ([facility] [address] A diagnosis of Acute cystitis without hematuria was also pertinent to this visit. has a past medical history of DJD (degenerative joint disease), Eczema, Elevated LFTs, HBP (high blood pressure), Hyperlipidemia, NIDDM (non-insulin dependent diabetes mellitus), and Scoliosis. has a past surgical history that includes Cholecystectomy; bladder suspension; Uterine Suspension; joint replacement (11);  Tonsillectomy and adenoidectomy; Colonoscopy; and Colonoscopy All fall risk precautions in place, Gait belt, Patient at risk for falls, Nurse notified, Call light within reach, Chair alarm in place, Left in chair  Restraints  Initially in place: No     Therapy Time   Individual Concurrent Group Co-treatment   Time In 1416         Time Out 1445         Minutes 29         Timed Code Treatment Minutes: 84 Cece Erickson, PT, DPT

## 2019-08-21 LAB
ALBUMIN SERPL-MCNC: 2.3 G/DL (ref 3.1–4.9)
ALPHA-1-GLOBULIN: 0.3 G/DL (ref 0.2–0.4)
ALPHA-2-GLOBULIN: 1.1 G/DL (ref 0.4–1.1)
BETA GLOBULIN: 0.8 G/DL (ref 0.9–1.6)
GAMMA GLOBULIN: 1.4 G/DL (ref 0.6–1.8)
GLUCOSE BLD-MCNC: 133 MG/DL (ref 70–99)
GLUCOSE BLD-MCNC: 140 MG/DL (ref 70–99)
GLUCOSE BLD-MCNC: 145 MG/DL (ref 70–99)
GLUCOSE BLD-MCNC: 147 MG/DL (ref 70–99)
GLUCOSE BLD-MCNC: 213 MG/DL (ref 70–99)
PERFORMED ON: ABNORMAL
SPE/IFE INTERPRETATION: NORMAL
TOTAL PROTEIN: 5.9 G/DL (ref 6.4–8.2)

## 2019-08-21 PROCEDURE — 2580000003 HC RX 258: Performed by: INTERNAL MEDICINE

## 2019-08-21 PROCEDURE — 6370000000 HC RX 637 (ALT 250 FOR IP): Performed by: INTERNAL MEDICINE

## 2019-08-21 PROCEDURE — 6370000000 HC RX 637 (ALT 250 FOR IP): Performed by: NURSE PRACTITIONER

## 2019-08-21 PROCEDURE — 97535 SELF CARE MNGMENT TRAINING: CPT

## 2019-08-21 PROCEDURE — 1200000000 HC SEMI PRIVATE

## 2019-08-21 PROCEDURE — 2700000000 HC OXYGEN THERAPY PER DAY

## 2019-08-21 PROCEDURE — 94761 N-INVAS EAR/PLS OXIMETRY MLT: CPT

## 2019-08-21 PROCEDURE — 97530 THERAPEUTIC ACTIVITIES: CPT

## 2019-08-21 PROCEDURE — 6360000002 HC RX W HCPCS: Performed by: INTERNAL MEDICINE

## 2019-08-21 PROCEDURE — 6360000002 HC RX W HCPCS: Performed by: NURSE PRACTITIONER

## 2019-08-21 RX ADMIN — Medication 10 ML: at 08:20

## 2019-08-21 RX ADMIN — METOPROLOL TARTRATE 50 MG: 50 TABLET ORAL at 21:39

## 2019-08-21 RX ADMIN — INSULIN LISPRO 1 UNITS: 100 INJECTION, SOLUTION INTRAVENOUS; SUBCUTANEOUS at 23:11

## 2019-08-21 RX ADMIN — ATORVASTATIN CALCIUM 40 MG: 40 TABLET, FILM COATED ORAL at 08:20

## 2019-08-21 RX ADMIN — INSULIN GLARGINE 10 UNITS: 100 INJECTION, SOLUTION SUBCUTANEOUS at 08:52

## 2019-08-21 RX ADMIN — TRAMADOL HYDROCHLORIDE 50 MG: 50 TABLET, FILM COATED ORAL at 01:10

## 2019-08-21 RX ADMIN — HEPARIN SODIUM 5000 UNITS: 5000 INJECTION INTRAVENOUS; SUBCUTANEOUS at 15:51

## 2019-08-21 RX ADMIN — TRAMADOL HYDROCHLORIDE 50 MG: 50 TABLET, FILM COATED ORAL at 21:38

## 2019-08-21 RX ADMIN — TRAMADOL HYDROCHLORIDE 50 MG: 50 TABLET, FILM COATED ORAL at 06:36

## 2019-08-21 RX ADMIN — HEPARIN SODIUM 5000 UNITS: 5000 INJECTION INTRAVENOUS; SUBCUTANEOUS at 21:37

## 2019-08-21 RX ADMIN — HEPARIN SODIUM 5000 UNITS: 5000 INJECTION INTRAVENOUS; SUBCUTANEOUS at 06:23

## 2019-08-21 RX ADMIN — ASPIRIN 81 MG 81 MG: 81 TABLET ORAL at 08:20

## 2019-08-21 RX ADMIN — OXYBUTYNIN CHLORIDE 2.5 MG: 5 TABLET ORAL at 08:20

## 2019-08-21 RX ADMIN — METOPROLOL TARTRATE 50 MG: 50 TABLET ORAL at 08:20

## 2019-08-21 RX ADMIN — Medication 10 ML: at 21:40

## 2019-08-21 RX ADMIN — INSULIN LISPRO 1 UNITS: 100 INJECTION, SOLUTION INTRAVENOUS; SUBCUTANEOUS at 17:55

## 2019-08-21 RX ADMIN — INSULIN LISPRO 2 UNITS: 100 INJECTION, SOLUTION INTRAVENOUS; SUBCUTANEOUS at 12:32

## 2019-08-21 RX ADMIN — AMLODIPINE BESYLATE 5 MG: 5 TABLET ORAL at 08:20

## 2019-08-21 RX ADMIN — OXYBUTYNIN CHLORIDE 2.5 MG: 5 TABLET ORAL at 21:39

## 2019-08-21 RX ADMIN — CIPROFLOXACIN 400 MG: 2 INJECTION, SOLUTION INTRAVENOUS at 11:48

## 2019-08-21 ASSESSMENT — PAIN SCALES - PAIN ASSESSMENT IN ADVANCED DEMENTIA (PAINAD)
CONSOLABILITY: 0
BREATHING: 0
FACIALEXPRESSION: 0
TOTALSCORE: 0
NEGVOCALIZATION: 0
BODYLANGUAGE: 0

## 2019-08-21 ASSESSMENT — PAIN DESCRIPTION - LOCATION
LOCATION: RIB CAGE
LOCATION: RIB CAGE

## 2019-08-21 ASSESSMENT — PAIN SCALES - GENERAL
PAINLEVEL_OUTOF10: 0
PAINLEVEL_OUTOF10: 5
PAINLEVEL_OUTOF10: 0
PAINLEVEL_OUTOF10: 7
PAINLEVEL_OUTOF10: 6

## 2019-08-21 ASSESSMENT — PAIN DESCRIPTION - ORIENTATION
ORIENTATION: RIGHT
ORIENTATION: RIGHT

## 2019-08-21 ASSESSMENT — PAIN DESCRIPTION - PAIN TYPE: TYPE: ACUTE PAIN

## 2019-08-21 ASSESSMENT — PAIN SCALES - WONG BAKER: WONGBAKER_NUMERICALRESPONSE: 2

## 2019-08-21 NOTE — PROGRESS NOTES
conservatively for the rib fracture. We are being consulted for acute kidney injury.     Recent NSAID- no   PPI's- no  high dose vitamin C intake- no  intake of star fruits,herbal supplements, star fruits- No  uncontrolled glucose levels-no  uncontrolled hypertension- no  Hypotension- no  heart issues- no  diarrhea, vomiting- no  contrast studies- no  symptoms of urinary retention- no  exposure to new drugs, sepsis- no  changes in medicine known to affect renal function directly-was listed to be on lisinopril         Interval History:     Cr coming down  Mentation better    ROS:     Seen with-no family    Unable to obtain ROS due to  Altered mental status/altered but does not recall details-said that she has pain in the rib fracture site         Medication:     Scheduled Meds:   amLODIPine  5 mg Oral Daily    aspirin  81 mg Oral Daily    atorvastatin  40 mg Oral Daily    insulin glargine  10 Units Subcutaneous Daily    metoprolol tartrate  50 mg Oral BID    oxybutynin  2.5 mg Oral BID    sodium chloride flush  10 mL Intravenous 2 times per day    heparin (porcine)  5,000 Units Subcutaneous 3 times per day    ciprofloxacin  400 mg Intravenous Q24H    insulin lispro  0-6 Units Subcutaneous TID WC    insulin lispro  0-3 Units Subcutaneous Nightly    lidocaine  1 patch Transdermal Daily     Continuous Infusions:   dextrose       PRN Meds:.traMADol, sodium chloride flush, magnesium hydroxide, ondansetron, acetaminophen, glucose, dextrose, glucagon (rDNA), dextrose       Vitals :     Vitals:    08/21/19 0814   BP:    Pulse: 80   Resp:    Temp: 98 °F (36.7 °C)   SpO2: 91%       I & O :       Intake/Output Summary (Last 24 hours) at 8/21/2019 1105  Last data filed at 8/21/2019 0957  Gross per 24 hour   Intake 420 ml   Output 1525 ml   Net -1105 ml        Physical Examination :     General appearance: Anxious- no, distressed- no, in good spirits- yes  Sitting up on chair  HEENT: Lips- normal, teeth- ok , oral mucosa- moist  Neck : Mass- no, appears symmetrical, JVD- not visible  Respiratory: Respiratory effort-normal, wheeze- no, crackles - no, tenderness in the rib site  Cardiovascular:  Ausculation- No M/R/G, Edema none  Abdomen: visible mass- no, distention- no, scar- no, tenderness- no                            hepatosplenomegaly-  no  Musculoskeletal:  clubbing no,cyanosis- no , digital ischemia- no                           muscle strength- grossly normal , tone - grossly normal  Skin: rashes- no , ulcers- no, induration- no, tightening - no  Psychiatric:  Judgement and insight- normal           AAO X 3  Has alona     LABS:     Recent Labs     08/20/19  1008   WBC 11.4*   HGB 10.7*   HCT 33.5*        Recent Labs     08/20/19  1008      K 4.1      CO2 23   BUN 15   CREATININE 1.1   GLUCOSE 114*   MG 2.00   PHOS 2.7

## 2019-08-21 NOTE — PLAN OF CARE
Problem: Falls - Risk of:  Goal: Will remain free from falls  Description  Will remain free from falls  8/21/2019 0033 by Claude Acevedo RN  Outcome: Ongoing  Note:   Bed in lowest position, wheels locked, 2/4 side rails up, nonskid footwear on. Bed/ chair check alarm in place, call light within reach. Pt instructed to call out when needing assistance. Pt stated understanding. Nurse will continue to monitor.      8/20/2019 1639 by Yanira Garcia RN  Outcome: Ongoing

## 2019-08-21 NOTE — PROGRESS NOTES
throughout  Cardiovascular:  Regular rate and rhythm with normal S1/S2 without murmurs, rubs or gallops. Abdomen: Soft, non-tender, non-distended with normal bowel sounds. Musculoskeletal:  No clubbing, cyanosis or edema bilaterally.  Full range of motion without deformity. Right chest tenderness along the midaxillary line at the eighth rib level  Skin: Skin color, texture normal.  No rashes or lesions.  Decreased skin turgor  Neurologic:  Neurovascularly intact without any focal sensory/motor deficits. Cranial nerves: II-XII intact, grossly non-focal.  Psychiatric:  Alert and oriented, thought content appropriate, normal insight  Capillary Refill: Brisk,< 3 seconds   Peripheral Pulses: +2 palpable, equal bilaterally       Labs:   Recent Labs     08/20/19  1008   WBC 11.4*   HGB 10.7*   HCT 33.5*        Recent Labs     08/20/19  1008      K 4.1      CO2 23   BUN 15   CREATININE 1.1   CALCIUM 8.7   PHOS 2.7     No results for input(s): AST, ALT, BILIDIR, BILITOT, ALKPHOS in the last 72 hours. No results for input(s): INR in the last 72 hours. No results for input(s): Coralyn Honour in the last 72 hours. Urinalysis:      Lab Results   Component Value Date    NITRU Negative 08/17/2019    WBCUA >100 08/17/2019    BACTERIA 3+ 08/17/2019    RBCUA see below 08/17/2019    BLOODU MODERATE 08/17/2019    SPECGRAV >=1.030 08/17/2019    GLUCOSEU 500 08/17/2019    GLUCOSEU NEGATIVE 04/01/2011       Radiology:  US RENAL COMPLETE   Final Result   Unremarkable ultrasound of the kidneys and urinary bladder. XR CHEST STANDARD (2 VW)   Final Result   No acute abnormality identified.                  Assessment/Plan:    Active Hospital Problems    Diagnosis    HTN (hypertension), benign [I10]     Priority: Medium    Moderate protein-calorie malnutrition (HCC) [E44.0]    OSCAR (acute kidney injury) ([facility] [address] [N17.9]    Vascular dementia with behavior disturbance [F01.51]    Type 2 diabetes mellitus

## 2019-08-22 ENCOUNTER — APPOINTMENT (OUTPATIENT)
Dept: GENERAL RADIOLOGY | Age: 84
DRG: 683 | End: 2019-08-22
Payer: MEDICARE

## 2019-08-22 LAB
ANION GAP SERPL CALCULATED.3IONS-SCNC: 10 MMOL/L (ref 3–16)
BASOPHILS ABSOLUTE: 0.1 K/UL (ref 0–0.2)
BASOPHILS RELATIVE PERCENT: 0.6 %
BUN BLDV-MCNC: 12 MG/DL (ref 7–20)
CALCIUM SERPL-MCNC: 8.7 MG/DL (ref 8.3–10.6)
CHLORIDE BLD-SCNC: 104 MMOL/L (ref 99–110)
CO2: 24 MMOL/L (ref 21–32)
CREAT SERPL-MCNC: 1.1 MG/DL (ref 0.6–1.2)
EOSINOPHILS ABSOLUTE: 0.7 K/UL (ref 0–0.6)
EOSINOPHILS RELATIVE PERCENT: 6.4 %
GFR AFRICAN AMERICAN: 57
GFR NON-AFRICAN AMERICAN: 48
GLUCOSE BLD-MCNC: 121 MG/DL (ref 70–99)
GLUCOSE BLD-MCNC: 131 MG/DL (ref 70–99)
GLUCOSE BLD-MCNC: 149 MG/DL (ref 70–99)
GLUCOSE BLD-MCNC: 158 MG/DL (ref 70–99)
GLUCOSE BLD-MCNC: 169 MG/DL (ref 70–99)
HCT VFR BLD CALC: 31.9 % (ref 36–48)
HEMOGLOBIN: 10.5 G/DL (ref 12–16)
LYMPHOCYTES ABSOLUTE: 1.2 K/UL (ref 1–5.1)
LYMPHOCYTES RELATIVE PERCENT: 10.8 %
MCH RBC QN AUTO: 28 PG (ref 26–34)
MCHC RBC AUTO-ENTMCNC: 32.9 G/DL (ref 31–36)
MCV RBC AUTO: 85.2 FL (ref 80–100)
MONOCYTES ABSOLUTE: 0.9 K/UL (ref 0–1.3)
MONOCYTES RELATIVE PERCENT: 7.7 %
NEUTROPHILS ABSOLUTE: 8.4 K/UL (ref 1.7–7.7)
NEUTROPHILS RELATIVE PERCENT: 74.5 %
PDW BLD-RTO: 14.1 % (ref 12.4–15.4)
PERFORMED ON: ABNORMAL
PLATELET # BLD: 249 K/UL (ref 135–450)
PMV BLD AUTO: 9.3 FL (ref 5–10.5)
POTASSIUM SERPL-SCNC: 3.8 MMOL/L (ref 3.5–5.1)
PRO-BNP: 5639 PG/ML (ref 0–449)
RBC # BLD: 3.74 M/UL (ref 4–5.2)
SODIUM BLD-SCNC: 138 MMOL/L (ref 136–145)
WBC # BLD: 11.3 K/UL (ref 4–11)

## 2019-08-22 PROCEDURE — 97530 THERAPEUTIC ACTIVITIES: CPT

## 2019-08-22 PROCEDURE — 85025 COMPLETE CBC W/AUTO DIFF WBC: CPT

## 2019-08-22 PROCEDURE — 97116 GAIT TRAINING THERAPY: CPT

## 2019-08-22 PROCEDURE — 71046 X-RAY EXAM CHEST 2 VIEWS: CPT

## 2019-08-22 PROCEDURE — 36415 COLL VENOUS BLD VENIPUNCTURE: CPT

## 2019-08-22 PROCEDURE — 2580000003 HC RX 258: Performed by: INTERNAL MEDICINE

## 2019-08-22 PROCEDURE — 6370000000 HC RX 637 (ALT 250 FOR IP): Performed by: NURSE PRACTITIONER

## 2019-08-22 PROCEDURE — 51702 INSERT TEMP BLADDER CATH: CPT

## 2019-08-22 PROCEDURE — 1200000000 HC SEMI PRIVATE

## 2019-08-22 PROCEDURE — 6370000000 HC RX 637 (ALT 250 FOR IP): Performed by: INTERNAL MEDICINE

## 2019-08-22 PROCEDURE — 6360000002 HC RX W HCPCS: Performed by: NURSE PRACTITIONER

## 2019-08-22 PROCEDURE — 83880 ASSAY OF NATRIURETIC PEPTIDE: CPT

## 2019-08-22 PROCEDURE — 6360000002 HC RX W HCPCS: Performed by: INTERNAL MEDICINE

## 2019-08-22 PROCEDURE — 80048 BASIC METABOLIC PNL TOTAL CA: CPT

## 2019-08-22 RX ORDER — FUROSEMIDE 20 MG/1
20 TABLET ORAL ONCE
Status: COMPLETED | OUTPATIENT
Start: 2019-08-22 | End: 2019-08-22

## 2019-08-22 RX ADMIN — METOPROLOL TARTRATE 50 MG: 50 TABLET ORAL at 09:11

## 2019-08-22 RX ADMIN — TRAMADOL HYDROCHLORIDE 50 MG: 50 TABLET, FILM COATED ORAL at 19:08

## 2019-08-22 RX ADMIN — TRAMADOL HYDROCHLORIDE 50 MG: 50 TABLET, FILM COATED ORAL at 05:11

## 2019-08-22 RX ADMIN — METOPROLOL TARTRATE 50 MG: 50 TABLET ORAL at 22:01

## 2019-08-22 RX ADMIN — INSULIN LISPRO 1 UNITS: 100 INJECTION, SOLUTION INTRAVENOUS; SUBCUTANEOUS at 23:21

## 2019-08-22 RX ADMIN — HEPARIN SODIUM 5000 UNITS: 5000 INJECTION INTRAVENOUS; SUBCUTANEOUS at 22:01

## 2019-08-22 RX ADMIN — TRAMADOL HYDROCHLORIDE 50 MG: 50 TABLET, FILM COATED ORAL at 23:26

## 2019-08-22 RX ADMIN — Medication 10 ML: at 09:13

## 2019-08-22 RX ADMIN — OXYBUTYNIN CHLORIDE 2.5 MG: 5 TABLET ORAL at 09:11

## 2019-08-22 RX ADMIN — INSULIN LISPRO 1 UNITS: 100 INJECTION, SOLUTION INTRAVENOUS; SUBCUTANEOUS at 13:05

## 2019-08-22 RX ADMIN — FUROSEMIDE 20 MG: 20 TABLET ORAL at 13:05

## 2019-08-22 RX ADMIN — ASPIRIN 81 MG 81 MG: 81 TABLET ORAL at 09:11

## 2019-08-22 RX ADMIN — TRAMADOL HYDROCHLORIDE 50 MG: 50 TABLET, FILM COATED ORAL at 09:11

## 2019-08-22 RX ADMIN — HEPARIN SODIUM 5000 UNITS: 5000 INJECTION INTRAVENOUS; SUBCUTANEOUS at 06:10

## 2019-08-22 RX ADMIN — CIPROFLOXACIN 400 MG: 2 INJECTION, SOLUTION INTRAVENOUS at 13:04

## 2019-08-22 RX ADMIN — AMLODIPINE BESYLATE 5 MG: 5 TABLET ORAL at 09:10

## 2019-08-22 RX ADMIN — HEPARIN SODIUM 5000 UNITS: 5000 INJECTION INTRAVENOUS; SUBCUTANEOUS at 13:05

## 2019-08-22 RX ADMIN — OXYBUTYNIN CHLORIDE 2.5 MG: 5 TABLET ORAL at 22:01

## 2019-08-22 RX ADMIN — ONDANSETRON 4 MG: 2 INJECTION INTRAMUSCULAR; INTRAVENOUS at 08:32

## 2019-08-22 RX ADMIN — ATORVASTATIN CALCIUM 40 MG: 40 TABLET, FILM COATED ORAL at 09:11

## 2019-08-22 ASSESSMENT — PAIN DESCRIPTION - ORIENTATION
ORIENTATION: RIGHT

## 2019-08-22 ASSESSMENT — PAIN SCALES - WONG BAKER
WONGBAKER_NUMERICALRESPONSE: 4
WONGBAKER_NUMERICALRESPONSE: 4
WONGBAKER_NUMERICALRESPONSE: 2
WONGBAKER_NUMERICALRESPONSE: 4
WONGBAKER_NUMERICALRESPONSE: 4

## 2019-08-22 ASSESSMENT — PAIN DESCRIPTION - PAIN TYPE
TYPE: ACUTE PAIN

## 2019-08-22 ASSESSMENT — PAIN DESCRIPTION - DESCRIPTORS
DESCRIPTORS: SHARP

## 2019-08-22 ASSESSMENT — PAIN SCALES - GENERAL
PAINLEVEL_OUTOF10: 7
PAINLEVEL_OUTOF10: 9
PAINLEVEL_OUTOF10: 7
PAINLEVEL_OUTOF10: 7
PAINLEVEL_OUTOF10: 9
PAINLEVEL_OUTOF10: 8
PAINLEVEL_OUTOF10: 7
PAINLEVEL_OUTOF10: 5
PAINLEVEL_OUTOF10: 8

## 2019-08-22 ASSESSMENT — PAIN DESCRIPTION - LOCATION
LOCATION: RIB CAGE
LOCATION: RIB CAGE;BACK
LOCATION: RIB CAGE

## 2019-08-22 NOTE — CARE COORDINATION
DCP spoke with pt daughter Tyrone London via phone to obtain SNF choice. She states that she has only looked at [facility] and is currently looking online for additional choices. DCP discussed that decision will be needed by 1300 as pt may be medically ready for DC today. Elma verbalized understanding. DCP will call at 1330 to obtain final choice.      Anatoliy Bhagat RN
Spoke with pt on this day to inquire about potential facility choice and to ensure she is on the same page. Sw also spoke with pt's daughter Cierra Butt who notes that the final decision is [facility]. Sw left VM for Hannah in admissions with [facility] to inform her. ADDENDUM: [facility] called back and can accept pt when ready. SW will follow and assist with a discharge upon orders.
nHpredict outcome report received and reviewed with patient. Provided patient with copy of report. Addressed all questions/concerns. Report uploaded into patient's record and can be viewed under Media Tab.        Jailene Carr RN  Care Transitions Nurse
disease), lumbar    History of total right hip replacement    Subclinical hypothyroidism    Moderate malnutrition ([address]    Acute encephalopathy    Vascular dementia with behavior disturbance    Delirious    Dementia due to Alzheimer's disease    Dyslipidemia    OSCAR (acute kidney injury) [facility])       Inpatient Assessment  Care Transitions Summary    Care Transitions Inpatient Review  Medication Review  Do you have all of your prescriptions and are they filled?:  Yes   Are you able to afford your medications?:  Yes  How often do you have difficulty taking your medications?:  I always take them as prescribed. Housing Review  Who do you live with?:  Alone  Are you an active caregiver in your home?:  No  Social Support  Do you have a ?:  No  Do you have a [facility]?:  Yes  Joel 80 Name:  [id number] Leann Hensley Nw from [facility]   [facility]  Patient DME:  Straight cane, Jordan Massiel, Shower chair, Other  Other Patient DME:  Life Alert, lift chair, new full power lift chair   Functional Review  Ability to seek help/take action for Emergent/Urgent situations i.e. fire, crime, inclement weather or health crisis. :  Independent  Ability handle personal hygiene needs (bathing/dressing/grooming):  Needs Assistance  Ability to manage medications:  Dependent  Ability to prepare food:  Dependent  Ability to maintain home (clean home, laundry):  Dependent  Ability to drive and/or has transportation:  Dependent  Ability to do shopping:  Dependent  Is patient able to live independently?:  No  Hearing and Vision  Visual Impairment:  Reading glasses  Hearing Impairment:  Hard of hearing  Care Transitions Interventions         Follow Up  Future Appointments   Date Time Provider Janak Muhammad   8/20/2019 10:30 AM ROSELIA SOLOMON [facility]   8/22/2019 10:30 AM REI Smith CNP [facility]       Health Maintenance  Health Maintenance Due   Topic Date Due    Lipid screen  08/27/2019

## 2019-08-22 NOTE — PROGRESS NOTES
Hospitalist Progress Note      PCP: REI Alex - CNP    Date of Admission: 8/17/2019       Chief Complaint:  Rib pain     Hospital Course:       Subjective: pleasantly confused, no complaints except rib pain , daughter not currently at bedside, resting in bed, had ongoing intermittent hypoxia today requiring oxygen, pt denies sob         Medications:  Reviewed    Infusion Medications    dextrose       Scheduled Medications    furosemide  20 mg Oral Once    amLODIPine  5 mg Oral Daily    aspirin  81 mg Oral Daily    atorvastatin  40 mg Oral Daily    insulin glargine  10 Units Subcutaneous Daily    metoprolol tartrate  50 mg Oral BID    oxybutynin  2.5 mg Oral BID    sodium chloride flush  10 mL Intravenous 2 times per day    heparin (porcine)  5,000 Units Subcutaneous 3 times per day    ciprofloxacin  400 mg Intravenous Q24H    insulin lispro  0-6 Units Subcutaneous TID WC    insulin lispro  0-3 Units Subcutaneous Nightly    lidocaine  1 patch Transdermal Daily     PRN Meds: traMADol, sodium chloride flush, magnesium hydroxide, ondansetron, acetaminophen, glucose, dextrose, glucagon (rDNA), dextrose      Intake/Output Summary (Last 24 hours) at 8/22/2019 1227  Last data filed at 8/22/2019 1225  Gross per 24 hour   Intake --   Output 2425 ml   Net -2425 ml       Physical Exam Performed:    BP (!) 153/71   Pulse 70   Temp 97.9 °F (36.6 °C) (Oral)   Resp 16   Ht 5' 5.5\" (1.664 m)   Wt 121 lb 12.8 oz (55.2 kg)   SpO2 94%   BMI 19.96 kg/m²     General appearance:  No apparent distress, appears stated age and cooperative. HEENT:  Normal cephalic, atraumatic without obvious deformity. Pupils equal, round, and reactive to light.  Extra ocular muscles intact. Conjunctivae/corneas clear.  Dry oral mucosa  Neck: Supple, with full range of motion. No jugular venous distention. Trachea midline. Respiratory:  Normal respiratory effort.  Clear to auscultation, bilaterally without Hospital Problems    Diagnosis    HTN (hypertension), benign [I10]     Priority: Medium    Moderate protein-calorie malnutrition (HCC) [E44.0]    OSCAR (acute kidney injury) ([facility] [address] [N17.9]    Vascular dementia with behavior disturbance [F01.51]    Type 2 diabetes mellitus without complication (HCC) [C62.4]    Mixed hyperlipidemia [E78.2]    UTI (urinary tract infection) [N39.0]     Acute renal failure- resolved  Etiology - likely prerenal due to decreased by mouth intake as well as GI losses +/- UTI and lisinopril  Workup - BUN/CR 66/2.5, Baseline creatinine not clear ,was 1.1 in March 19  Plan - IV fluids with isotonic saline infusion, urine lites, urine creatinine, CK          - Rechecked renal panel           - Avoid nephrotoxic medications, held lisinopril          - Consulted nephrology, apprec recs  - renal u/s ordered and unremarkable     Right rib pain- due to recent rib fracture, no pnx noted, likely due to fall 4 days PTA(was tx'd in ER at the time)  -supportive care     recurrent UTI, previous enterococcus sens to cipro (no sens to Rocephin)    - DC iv rocephin, start cipro q24 (renal dose) on 8/18  - f/u urine cultures noted <50k mixed xiomara     Essential hypertension - controlled resume home medications and monitor BP, hold ACEi   -reduced norvasc to 5mg daily     Type 2 diabetes mellitus - last A1C 9.1, resume lantus, started on SSI with Accu-Cheks   -a1c 8.5 here     Mixed hyperlipidemia - resume statin     Dementia - family contemplating placement in ECF, consulted CM/SW, PT/OT        DVT Prophylaxis: heparin sq  Diet: DIET CARB CONTROL;   Dietary Nutrition Supplements: Diabetic Oral Supplement  Code Status: Full Code    PT/OT Eval Status: rec snf    Dispo - trial dose of lasix x 1, hopefully tomorrow if not needing any oxygen    Isaías Marquez MD

## 2019-08-23 LAB
GLUCOSE BLD-MCNC: 118 MG/DL (ref 70–99)
GLUCOSE BLD-MCNC: 128 MG/DL (ref 70–99)
GLUCOSE BLD-MCNC: 138 MG/DL (ref 70–99)
GLUCOSE BLD-MCNC: 274 MG/DL (ref 70–99)
KAPPA, FREE LIGHT CHAINS, SERUM: 59.79 MG/L (ref 3.3–19.4)
KAPPA/LAMBDA RATIO: 1.52 (ref 0.26–1.65)
KAPPA/LAMBDA TEST COMMENT: ABNORMAL
LAMBDA, FREE LIGHT CHAINS, SERUM: 39.29 MG/L (ref 5.71–26.3)
PERFORMED ON: ABNORMAL

## 2019-08-23 PROCEDURE — 6360000002 HC RX W HCPCS: Performed by: INTERNAL MEDICINE

## 2019-08-23 PROCEDURE — 97535 SELF CARE MNGMENT TRAINING: CPT

## 2019-08-23 PROCEDURE — 51798 US URINE CAPACITY MEASURE: CPT

## 2019-08-23 PROCEDURE — 6360000002 HC RX W HCPCS: Performed by: NURSE PRACTITIONER

## 2019-08-23 PROCEDURE — 6370000000 HC RX 637 (ALT 250 FOR IP): Performed by: INTERNAL MEDICINE

## 2019-08-23 PROCEDURE — 97530 THERAPEUTIC ACTIVITIES: CPT

## 2019-08-23 PROCEDURE — 1200000000 HC SEMI PRIVATE

## 2019-08-23 PROCEDURE — 2580000003 HC RX 258: Performed by: INTERNAL MEDICINE

## 2019-08-23 PROCEDURE — 6370000000 HC RX 637 (ALT 250 FOR IP): Performed by: NURSE PRACTITIONER

## 2019-08-23 PROCEDURE — 51702 INSERT TEMP BLADDER CATH: CPT

## 2019-08-23 RX ADMIN — INSULIN LISPRO 3 UNITS: 100 INJECTION, SOLUTION INTRAVENOUS; SUBCUTANEOUS at 12:49

## 2019-08-23 RX ADMIN — ACETAMINOPHEN 650 MG: 325 TABLET ORAL at 13:31

## 2019-08-23 RX ADMIN — METOPROLOL TARTRATE 50 MG: 50 TABLET ORAL at 09:21

## 2019-08-23 RX ADMIN — ONDANSETRON 4 MG: 2 INJECTION INTRAMUSCULAR; INTRAVENOUS at 13:42

## 2019-08-23 RX ADMIN — Medication 10 ML: at 13:42

## 2019-08-23 RX ADMIN — HEPARIN SODIUM 5000 UNITS: 5000 INJECTION INTRAVENOUS; SUBCUTANEOUS at 06:02

## 2019-08-23 RX ADMIN — ASPIRIN 81 MG 81 MG: 81 TABLET ORAL at 09:21

## 2019-08-23 RX ADMIN — AMLODIPINE BESYLATE 5 MG: 5 TABLET ORAL at 09:22

## 2019-08-23 RX ADMIN — METOPROLOL TARTRATE 50 MG: 50 TABLET ORAL at 22:11

## 2019-08-23 RX ADMIN — TRAMADOL HYDROCHLORIDE 50 MG: 50 TABLET, FILM COATED ORAL at 22:07

## 2019-08-23 RX ADMIN — TRAMADOL HYDROCHLORIDE 50 MG: 50 TABLET, FILM COATED ORAL at 11:08

## 2019-08-23 RX ADMIN — CIPROFLOXACIN 400 MG: 2 INJECTION, SOLUTION INTRAVENOUS at 12:57

## 2019-08-23 RX ADMIN — OXYBUTYNIN CHLORIDE 2.5 MG: 5 TABLET ORAL at 09:22

## 2019-08-23 RX ADMIN — ATORVASTATIN CALCIUM 40 MG: 40 TABLET, FILM COATED ORAL at 09:21

## 2019-08-23 RX ADMIN — TRAMADOL HYDROCHLORIDE 50 MG: 50 TABLET, FILM COATED ORAL at 15:21

## 2019-08-23 RX ADMIN — Medication 10 ML: at 22:07

## 2019-08-23 RX ADMIN — OXYBUTYNIN CHLORIDE 2.5 MG: 5 TABLET ORAL at 22:06

## 2019-08-23 RX ADMIN — Medication 10 ML: at 09:22

## 2019-08-23 ASSESSMENT — PAIN SCALES - WONG BAKER
WONGBAKER_NUMERICALRESPONSE: 4

## 2019-08-23 ASSESSMENT — PAIN SCALES - GENERAL
PAINLEVEL_OUTOF10: 7
PAINLEVEL_OUTOF10: 7
PAINLEVEL_OUTOF10: 4
PAINLEVEL_OUTOF10: 5
PAINLEVEL_OUTOF10: 5

## 2019-08-23 NOTE — CONSULTS
Thanks for consulting [facility] Nephrology for the care of Efraín Valadez. Full consult will follow  Please call with questions at-  24 Hrs Answering service [phone]  Perfect serve, or cell phone 7 am - 014 [facility], MD   [facility] nephrology  [facility]
08/22/2019     PT/INR:    Lab Results   Component Value Date    PROTIME 11.5 03/21/2019    INR 1.01 03/21/2019     PTT:    Lab Results   Component Value Date    APTT 27.5 04/01/2011   [APTT    Urinalysis:     Imaging:    EXAMINATION:   RETROPERITONEAL ULTRASOUND OF THE KIDNEYS AND URINARY BLADDER       8/18/2019       COMPARISON:   None       HISTORY:   ORDERING SYSTEM PROVIDED HISTORY: Acute kidney injury   TECHNOLOGIST PROVIDED HISTORY:   Please comment on size and echogenicity.  Thanks       FINDINGS:       Kidneys:       The right kidney measures 9.5 cm in length and the left kidney measures 9.2   cm in length.       Kidneys demonstrate normal cortical echogenicity.  No evidence of   hydronephrosis or intrarenal stones.           Bladder:       The bladder is collapsed and contains a Wade catheter.           Impression   Unremarkable ultrasound of the kidneys and urinary bladder.                 Impression/Plan:   Gross hematuria-resolved now  Will do voiding trial today  Pt can complete outpatient hematuria workup as scheduled    Socorro Chao PA-C

## 2019-08-23 NOTE — PLAN OF CARE
Problem: Falls - Risk of:  Goal: Will remain free from falls  Description  Will remain free from falls  Outcome: Ongoing     Problem: Pain:  Goal: Control of acute pain  Description  Control of acute pain  Outcome: Ongoing     Problem: DAILY CARE  Goal: Daily care needs are met  Outcome: Ongoing     Problem: PAIN  Goal: Patient's pain/discomfort is manageable  Outcome: Ongoing

## 2019-08-23 NOTE — PROGRESS NOTES
Nutrition Assessment    Type and Reason for Visit: Reassess    Nutrition Recommendations:   Continue carb controlled diet and glucerna ONS BID  Encourage PO intake   Monitor nutrition adequacy, pertinent labs, bowel habits, wt changes, and clinical progress      Nutrition Assessment: Pt remains nutritionally compromised AEB consuming less than 50% of meals and taking sips of supplements. She is not very talkative but reponds to not having a good appetite. RD encouraged PO intake and discussed the importance of adequate nutrition intake to promote healing and build strength. Malnutrition Assessment:  · Malnutrition Status: Meets the criteria for moderate malnutrition  · Context: Chronic illness  · Findings of the 6 clinical characteristics of malnutrition (Minimum of 2 out of 6 clinical characteristics is required to make the diagnosis of moderate or severe Protein Calorie Malnutrition based on AND/ASPEN Guidelines):  1. Energy Intake-Less than or equal to 75% of estimated energy requirement, Unable to assess    2. Weight Loss-10% loss or greater, in 6 months  3. Fat Loss-Mild subcutaneous fat loss, Orbital  4. Muscle Loss-Moderate muscle mass loss, Clavicles (pectoralis and deltoids), Scapula (trapezius), Interosseous  5. Fluid Accumulation-No significant fluid accumulation,    6.  Strength-Normal    Nutrition Risk Level:  Moderate    Nutrient Needs:  · Estimated Daily Total Kcal: 1598-5224  · Estimated Daily Protein (g): 44-55  · Estimated Daily Total Fluid (ml/day): 1 ml/kcal or per physician    Nutrition Diagnosis:   · Problem: Inadequate oral intake  · Etiology: related to Insufficient energy/nutrient consumption     Signs and symptoms:  as evidenced by Weight loss greater than or equal to 10% in 6 months, Intake 0-25%, Intake 25-50%, Mild loss of subcutaneous fat, Moderate muscle loss    Objective Information:  · Nutrition-Focused Physical Findings:  8/22  · Wound Type: None  · Current Nutrition

## 2019-08-23 NOTE — PROGRESS NOTES
No  Referring Practitioner: REI Arambula CNP 8/18/19  Diagnosis:  OSCAR, recurrent UTI    Subjective  Subjective: Pt resting in bed, agreeable to OT treatment with encouragement. Vital Signs  Patient Currently in Pain: Denies     Orientation  Orientation  Orientation Level: Oriented to person;Oriented to place; Disoriented to time     Objective    ADL  Grooming: Contact guard assistance;Verbal cueing; Increased time to complete(sink level)  LE Dressing: Minimal assistance; Increased time to complete  Toileting: Minimal assistance; Increased time to complete     Balance  Sitting Balance: Supervision  Standing Balance: Contact guard assistance(with RW)    Functional Mobility  Functional - Mobility Device: Rolling Walker  Activity: To/from bathroom  Assist Level: Contact guard assistance  Functional Mobility Comments: cues for safety throughout    Toilet Transfers  Toilet - Technique: Ambulating(with RW)  Equipment Used: Standard toilet(with use of grab bars)  Toilet Transfer: Contact guard assistance    Bed mobility  Supine to Sit: Minimal assistance(to R with HOB elevated, increased time, and verbal cues)     Transfers  Sit to stand: Contact guard assistance  Stand to sit: Contact guard assistance  Transfer Comments: cues for safe hand placement     Cognition  Overall Cognitive Status: Exceptions  Arousal/Alertness: Delayed responses to stimuli  Following Commands: Follows one step commands with repetition; Follows one step commands with increased time  Attention Span: Difficulty attending to directions; Attends with cues to redirect  Memory: Decreased short term memory;Decreased recall of recent events  Safety Judgement: Decreased awareness of need for safety;Decreased awareness of need for assistance  Insights: Decreased awareness of deficits  Initiation: Requires cues for some  Sequencing: Requires cues for some     Plan   Plan  Times per week: 3-5x's a week while in acute care    AM-PAC Score  AM-PAC Inpatient Daily Activity Raw Score: 15 (08/23/19 1131)  AM-PAC Inpatient ADL T-Scale Score : 34.69 (08/23/19 1131)  ADL Inpatient CMS 0-100% Score: 56.46 (08/23/19 1131)  ADL Inpatient CMS G-Code Modifier : CK (08/23/19 1131)    Goals  Short term goals  Time Frame for Short term goals: 1 week unless otherwise specified 8/26  Short term goal 1: Pt will complete LE dressing with SBA  Short term goal 2: Pt will complete toilet transfer with SBA by 8/24  Short term goal 3: Pt will complete standing level ADLs with SBA for balance  Patient Goals   Patient goals : \"to get better\"     Therapy Time   Individual Concurrent Group Co-treatment   Time In 0818         Time Out 1025         Minutes [address]ado/L

## 2019-08-23 NOTE — PROGRESS NOTES
Hospitalist Progress Note      PCP: Ravi Eller APRN - CNP    Date of Admission: 8/17/2019       Chief Complaint:  Rib pain     Hospital Course:       Subjective: pleasantly confused, no complaints except rib pain , daughter not currently at bedside, resting in bed, had hematuria and urology consulted overnight    Medications:  Reviewed    Infusion Medications    dextrose       Scheduled Medications    amLODIPine  5 mg Oral Daily    aspirin  81 mg Oral Daily    atorvastatin  40 mg Oral Daily    insulin glargine  10 Units Subcutaneous Daily    metoprolol tartrate  50 mg Oral BID    oxybutynin  2.5 mg Oral BID    sodium chloride flush  10 mL Intravenous 2 times per day    heparin (porcine)  5,000 Units Subcutaneous 3 times per day    ciprofloxacin  400 mg Intravenous Q24H    insulin lispro  0-6 Units Subcutaneous TID WC    insulin lispro  0-3 Units Subcutaneous Nightly    lidocaine  1 patch Transdermal Daily     PRN Meds: traMADol, sodium chloride flush, magnesium hydroxide, ondansetron, acetaminophen, glucose, dextrose, glucagon (rDNA), dextrose      Intake/Output Summary (Last 24 hours) at 8/23/2019 1006  Last data filed at 8/23/2019 0814  Gross per 24 hour   Intake --   Output 1600 ml   Net -1600 ml       Physical Exam Performed:    BP (!) 143/63   Pulse 66   Temp 97.4 °F (36.3 °C) (Oral)   Resp 16   Ht 5' 5.5\" (1.664 m)   Wt 121 lb 12.8 oz (55.2 kg)   SpO2 91%   BMI 19.96 kg/m²     General appearance:  No apparent distress, appears stated age and cooperative. HEENT:  Normal cephalic, atraumatic without obvious deformity. Pupils equal, round, and reactive to light.  Extra ocular muscles intact. Conjunctivae/corneas clear.  Dry oral mucosa  Neck: Supple, with full range of motion. No jugular venous distention. Trachea midline. Respiratory:  Normal respiratory effort. Clear to auscultation, bilaterally without Rales/Wheezes/Rhonchi.  Poor sounds throughout  Cardiovascular:  Regular

## 2019-08-24 VITALS
BODY MASS INDEX: 19.58 KG/M2 | DIASTOLIC BLOOD PRESSURE: 65 MMHG | OXYGEN SATURATION: 91 % | RESPIRATION RATE: 16 BRPM | WEIGHT: 121.8 LBS | HEIGHT: 66 IN | HEART RATE: 71 BPM | TEMPERATURE: 98.3 F | SYSTOLIC BLOOD PRESSURE: 118 MMHG

## 2019-08-24 PROBLEM — N17.9 AKI (ACUTE KIDNEY INJURY) (HCC): Status: RESOLVED | Noted: 2019-08-17 | Resolved: 2019-08-24

## 2019-08-24 LAB
ALBUMIN SERPL-MCNC: 3.1 G/DL (ref 3.4–5)
ANION GAP SERPL CALCULATED.3IONS-SCNC: 9 MMOL/L (ref 3–16)
BASOPHILS ABSOLUTE: 0.1 K/UL (ref 0–0.2)
BASOPHILS RELATIVE PERCENT: 0.8 %
BUN BLDV-MCNC: 17 MG/DL (ref 7–20)
CALCIUM SERPL-MCNC: 8.8 MG/DL (ref 8.3–10.6)
CHLORIDE BLD-SCNC: 100 MMOL/L (ref 99–110)
CO2: 27 MMOL/L (ref 21–32)
CREAT SERPL-MCNC: 1.5 MG/DL (ref 0.6–1.2)
EOSINOPHILS ABSOLUTE: 0.6 K/UL (ref 0–0.6)
EOSINOPHILS RELATIVE PERCENT: 6.4 %
GFR AFRICAN AMERICAN: 40
GFR NON-AFRICAN AMERICAN: 33
GLUCOSE BLD-MCNC: 114 MG/DL (ref 70–99)
GLUCOSE BLD-MCNC: 121 MG/DL (ref 70–99)
GLUCOSE BLD-MCNC: 217 MG/DL (ref 70–99)
HCT VFR BLD CALC: 30.2 % (ref 36–48)
HEMOGLOBIN: 9.9 G/DL (ref 12–16)
LYMPHOCYTES ABSOLUTE: 1.4 K/UL (ref 1–5.1)
LYMPHOCYTES RELATIVE PERCENT: 14.4 %
MAGNESIUM: 1.8 MG/DL (ref 1.8–2.4)
MCH RBC QN AUTO: 28.2 PG (ref 26–34)
MCHC RBC AUTO-ENTMCNC: 32.8 G/DL (ref 31–36)
MCV RBC AUTO: 86.1 FL (ref 80–100)
MONOCYTES ABSOLUTE: 1 K/UL (ref 0–1.3)
MONOCYTES RELATIVE PERCENT: 10.8 %
NEUTROPHILS ABSOLUTE: 6.4 K/UL (ref 1.7–7.7)
NEUTROPHILS RELATIVE PERCENT: 67.6 %
PDW BLD-RTO: 14.5 % (ref 12.4–15.4)
PERFORMED ON: ABNORMAL
PERFORMED ON: ABNORMAL
PHOSPHORUS: 3.3 MG/DL (ref 2.5–4.9)
PLATELET # BLD: 241 K/UL (ref 135–450)
PMV BLD AUTO: 9.4 FL (ref 5–10.5)
POTASSIUM SERPL-SCNC: 4.2 MMOL/L (ref 3.5–5.1)
RBC # BLD: 3.5 M/UL (ref 4–5.2)
SODIUM BLD-SCNC: 136 MMOL/L (ref 136–145)
WBC # BLD: 9.5 K/UL (ref 4–11)

## 2019-08-24 PROCEDURE — 85025 COMPLETE CBC W/AUTO DIFF WBC: CPT

## 2019-08-24 PROCEDURE — 6360000002 HC RX W HCPCS: Performed by: NURSE PRACTITIONER

## 2019-08-24 PROCEDURE — 80069 RENAL FUNCTION PANEL: CPT

## 2019-08-24 PROCEDURE — 6370000000 HC RX 637 (ALT 250 FOR IP): Performed by: INTERNAL MEDICINE

## 2019-08-24 PROCEDURE — 2580000003 HC RX 258

## 2019-08-24 PROCEDURE — 6370000000 HC RX 637 (ALT 250 FOR IP): Performed by: NURSE PRACTITIONER

## 2019-08-24 PROCEDURE — 51798 US URINE CAPACITY MEASURE: CPT

## 2019-08-24 PROCEDURE — 83735 ASSAY OF MAGNESIUM: CPT

## 2019-08-24 PROCEDURE — 36415 COLL VENOUS BLD VENIPUNCTURE: CPT

## 2019-08-24 PROCEDURE — 2580000003 HC RX 258: Performed by: INTERNAL MEDICINE

## 2019-08-24 PROCEDURE — 51701 INSERT BLADDER CATHETER: CPT

## 2019-08-24 RX ORDER — LIDOCAINE 4 G/G
1 PATCH TOPICAL DAILY
DISCHARGE
Start: 2019-08-25

## 2019-08-24 RX ORDER — SODIUM CHLORIDE 9 MG/ML
INJECTION, SOLUTION INTRAVENOUS
Status: COMPLETED
Start: 2019-08-24 | End: 2019-08-24

## 2019-08-24 RX ADMIN — TRAMADOL HYDROCHLORIDE 50 MG: 50 TABLET, FILM COATED ORAL at 06:35

## 2019-08-24 RX ADMIN — TRAMADOL HYDROCHLORIDE 50 MG: 50 TABLET, FILM COATED ORAL at 10:58

## 2019-08-24 RX ADMIN — TRAMADOL HYDROCHLORIDE 50 MG: 50 TABLET, FILM COATED ORAL at 02:08

## 2019-08-24 RX ADMIN — TRAMADOL HYDROCHLORIDE 50 MG: 50 TABLET, FILM COATED ORAL at 15:47

## 2019-08-24 RX ADMIN — ASPIRIN 81 MG 81 MG: 81 TABLET ORAL at 09:30

## 2019-08-24 RX ADMIN — OXYBUTYNIN CHLORIDE 2.5 MG: 5 TABLET ORAL at 09:31

## 2019-08-24 RX ADMIN — SODIUM CHLORIDE: 9 INJECTION, SOLUTION INTRAVENOUS at 14:09

## 2019-08-24 RX ADMIN — INSULIN LISPRO 2 UNITS: 100 INJECTION, SOLUTION INTRAVENOUS; SUBCUTANEOUS at 12:24

## 2019-08-24 RX ADMIN — Medication 10 ML: at 09:50

## 2019-08-24 RX ADMIN — ATORVASTATIN CALCIUM 40 MG: 40 TABLET, FILM COATED ORAL at 09:31

## 2019-08-24 RX ADMIN — AMLODIPINE BESYLATE 5 MG: 5 TABLET ORAL at 09:31

## 2019-08-24 RX ADMIN — INSULIN GLARGINE 10 UNITS: 100 INJECTION, SOLUTION SUBCUTANEOUS at 09:56

## 2019-08-24 RX ADMIN — CIPROFLOXACIN 400 MG: 2 INJECTION, SOLUTION INTRAVENOUS at 10:58

## 2019-08-24 ASSESSMENT — PAIN SCALES - GENERAL
PAINLEVEL_OUTOF10: 7
PAINLEVEL_OUTOF10: 5
PAINLEVEL_OUTOF10: 7
PAINLEVEL_OUTOF10: 7

## 2019-08-24 NOTE — PLAN OF CARE
Problem: Falls - Risk of:  Goal: Will remain free from falls  Description  Will remain free from falls  8/23/2019 2332 by Janet Morrow RN  Outcome: Ongoing  8/23/2019 1145 by Carlotta Charles RN  Outcome: Ongoing     Problem: Falls - Risk of:  Goal: Absence of physical injury  Description  Absence of physical injury  Outcome: Ongoing

## 2019-08-24 NOTE — PROGRESS NOTES
Urology Progress Note    Subjective: I am ok. HPI: Patient has been admitted for a rib fracture, she also has voiding issues. She was incontinent last night and a PVR was >500. She is voiding better this am.    P/E  BP (!) 113/58   Pulse 66   Temp 97.7 °F (36.5 °C) (Oral)   Resp 16   Ht 5' 5.5\" (1.664 m)   Wt 121 lb 12.8 oz (55.2 kg)   SpO2 92%   BMI 19.96 kg/m²   Skin: Intact  Respiratory: Breathing without difficulty, stable. GI: No acute changes, stable po intake. : voiding. MSK: No acute changes, stable. Neuro: No acute changes, stable. Labs  Lab Results   Component Value Date    WBC 9.5 08/24/2019    HGB 9.9 08/24/2019    HCT 30.2 08/24/2019    MCV 86.1 08/24/2019     08/24/2019     Lab Results   Component Value Date     08/24/2019    K 4.2 08/24/2019    K 4.7 08/18/2019     08/24/2019    CO2 27 08/24/2019    BUN 17 08/24/2019    CREATININE 1.5 08/24/2019    CALCIUM 8.8 08/24/2019       Assessment/Plan  Overall stable, continue with timed voiding, hold from catheter.     Electronically signed by Leah Romero MD on 8/24/2019 at 2:57 PM

## 2019-08-24 NOTE — PROGRESS NOTES
[facility]    [facility]              [phone]                    Plan :     Renal function stable  Due to age, risk of fall, will do less tight control of BP  BP is reasonable   Hematuria- gross, resolved, seen by urology, plan for outpatient workup     Assessment :     Acute Kidney Injury  Creatinine peaked to 2.5-down to 1.5   Prerenal  Lisinopril on hold  Got iv fluids- now off it  She also appears to have chronic kidney disease with creatinine of 1.6 on 6/19, 1.8 on 5/19-not sure she sees followed by nephrologist  CKD likely due to diabetes and hypertension    UA-8/19-moderate blood, large LE-getting antibiotics  Renal imaging-8/29- normal  2D echo: 5/17-EF 55%, mild thickening of the anterior leaflet of mitral valve with mitral valve prolapse, grade 1 diastolic dysfunction with normal filling pressure    Hematuria  Seen by urology  Resolved  Outpatient follow up    Hyponatremia  Normal normal    Hyperkalemia  Potassium 5.5 on admission now better  Due to OSCAR, lisinopril and hyperglycemia       Hypertension   BP: (113-133)/(58-68)  Pulse:  [66]   BP is stable    UTI  Fall and fracture rib  Type 2 diabetes mellitus-uncontrolled    [facility] would like to thank Yvonne Cooper MD   for opportunity to serve this patient      Please call with questions at-   24 Hrs Answering service [phone] or  7 am- 5 pm via Perfect serve or cell phone  Tiago Loja          CC/reason for consult :     Acute renal failure     HPI :     From consult note-     Leslye Jenny is a 82 y.o. female presented to   the hospital on 8/17/2019 with right hip pain for 4 days. The pain has been progressively getting worse. she has had a fall about 4 days,  When she fractured her right rib. She presented to emergency room and was sent home with pain medicine and spirometer. However because of the worsening pain she came back to the hospital.  She lives by herself.   She is very lethargic and distressed and unable to keep much detailed history. She is  being managed conservatively for the rib fracture. We are being consulted for acute kidney injury.     Recent NSAID- no   PPI's- no  high dose vitamin C intake- no  intake of star fruits,herbal supplements, star fruits- No  uncontrolled glucose levels-no  uncontrolled hypertension- no  Hypotension- no  heart issues- no  diarrhea, vomiting- no  contrast studies- no  symptoms of urinary retention- no  exposure to new drugs, sepsis- no  changes in medicine known to affect renal function directly-was listed to be on lisinopril         Interval History:     Cr stable      ROS:     Seen with-no family    SOB- none  Edema- none  Nausea/vomiting- none  Poor appetite-No  Confusion- no  Urinary complaints- no  Any other complaints- no  All other ROS are reviewed and are Negative           Medication:     Scheduled Meds:   amLODIPine  5 mg Oral Daily    aspirin  81 mg Oral Daily    atorvastatin  40 mg Oral Daily    insulin glargine  10 Units Subcutaneous Daily    metoprolol tartrate  50 mg Oral BID    oxybutynin  2.5 mg Oral BID    sodium chloride flush  10 mL Intravenous 2 times per day    ciprofloxacin  400 mg Intravenous Q24H    insulin lispro  0-6 Units Subcutaneous TID WC    insulin lispro  0-3 Units Subcutaneous Nightly    lidocaine  1 patch Transdermal Daily     Continuous Infusions:   dextrose       PRN Meds:.traMADol, sodium chloride flush, magnesium hydroxide, ondansetron, acetaminophen, glucose, dextrose, glucagon (rDNA), dextrose       Vitals :     Vitals:    08/24/19 1223   BP: (!) 113/58   Pulse:    Resp:    Temp:    SpO2:        I & O :       Intake/Output Summary (Last 24 hours) at 8/24/2019 1442  Last data filed at 8/24/2019 0636  Gross per 24 hour   Intake 500 ml   Output 325 ml   Net 175 ml        Physical Examination :     General appearance: Anxious- no, distressed- no, in good spirits- yes  Sitting up on

## 2019-08-24 NOTE — DISCHARGE SUMMARY
mellitus - last A1C 9.1, resume lantus, started on SSI with Accu-Cheks   -a1c 8.5 here     Mixed hyperlipidemia - resume statin     Dementia - family contemplating placement in ECF, consulted CM/SW, PT/OT      Gross hematuria- noted overnight on 8/22  -urology consulted, ok with dc and f/u as outpt workup  -trial of voiding  - outpatient hematuria workup per Urology    Discharge Medications:    amLODIPine (NORVASC) 10 MG tablet  Take 1 tablet by mouth daily     aspirin 81 MG chewable tablet  Take 1 tablet by mouth daily     atorvastatin (LIPITOR) 40 MG tablet  TAKE ONE TABLET BY MOUTH DAILY     canagliflozin (INVOKANA) 100 MG TABS tablet  Take 1 tablet by mouth daily     insulin glargine (TOUJEO SOLOSTAR) 300 UNIT/ML injection pen  Inject 10 Units into the skin daily     lidocaine 4 % external patch  Place 1 patch onto the skin daily     Multiple Vitamins-Minerals (VISION VITAMINS PO)  Take 1 tablet by mouth daily     oxybutynin (DITROPAN) 5 MG tablet  TAKE ONE TABLET BY MOUTH Two TIMES A DAY         Consults: Nephrology, Urology, Case Management   PT/OT recommends Skilled Nursing Facility    Significant Diagnostic Studies:    PA/lat CXR (8/22) Increased small effusions and bibasilar airspace disease which could represent atelectasis or pneumonia. Renal Ultrasound (8/19) Unremarkable ultrasound of the kidneys and urinary bladder. X-ray Rib Series (8/14) Acute fracture of the right 8th rib, without associated pneumothorax. Treatments:   Supportive care, antibiotic therapy for UTI, intravenous fluids, analgesic, PT/OT, medication adjustment    Disposition:   [facility] [place] [id number]  Follow up with medical director in the next 2-3 days and then [address] within a week of discharge from [facility].       Signed:  ROYA OAKES  8/24/2019, 12:56 PM

## 2019-11-21 ENCOUNTER — CARE COORDINATION (OUTPATIENT)
Dept: CARE COORDINATION | Age: 84
End: 2019-11-21

## 2019-12-05 ENCOUNTER — CARE COORDINATION (OUTPATIENT)
Dept: CARE COORDINATION | Age: 84
End: 2019-12-05

## 2019-12-05 ENCOUNTER — APPOINTMENT (OUTPATIENT)
Dept: CT IMAGING | Age: 84
DRG: 378 | End: 2019-12-05
Payer: MEDICARE

## 2019-12-05 ENCOUNTER — HOSPITAL ENCOUNTER (INPATIENT)
Age: 84
LOS: 3 days | Discharge: NURSING, REHAB OR LONG-TERM CARE | DRG: 378 | End: 2019-12-08
Attending: EMERGENCY MEDICINE | Admitting: INTERNAL MEDICINE
Payer: MEDICARE

## 2019-12-05 DIAGNOSIS — N39.0 URINARY TRACT INFECTION WITH HEMATURIA, SITE UNSPECIFIED: ICD-10-CM

## 2019-12-05 DIAGNOSIS — R31.9 URINARY TRACT INFECTION WITH HEMATURIA, SITE UNSPECIFIED: ICD-10-CM

## 2019-12-05 DIAGNOSIS — D64.9 ANEMIA, UNSPECIFIED TYPE: ICD-10-CM

## 2019-12-05 DIAGNOSIS — N28.9 KIDNEY LESION, NATIVE, RIGHT: ICD-10-CM

## 2019-12-05 DIAGNOSIS — N17.9 AKI (ACUTE KIDNEY INJURY) (HCC): ICD-10-CM

## 2019-12-05 DIAGNOSIS — K92.2 GASTROINTESTINAL HEMORRHAGE, UNSPECIFIED GASTROINTESTINAL HEMORRHAGE TYPE: Primary | ICD-10-CM

## 2019-12-05 DIAGNOSIS — N13.4 HYDROURETER ON LEFT: ICD-10-CM

## 2019-12-05 DIAGNOSIS — N13.30 HYDRONEPHROSIS OF LEFT KIDNEY: ICD-10-CM

## 2019-12-05 PROBLEM — E87.1 HYPONATREMIA: Status: ACTIVE | Noted: 2019-12-05

## 2019-12-05 LAB
A/G RATIO: 0.6 (ref 1.1–2.2)
ABO/RH: NORMAL
ALBUMIN SERPL-MCNC: 3.1 G/DL (ref 3.4–5)
ALP BLD-CCNC: 106 U/L (ref 40–129)
ALT SERPL-CCNC: 8 U/L (ref 10–40)
ANION GAP SERPL CALCULATED.3IONS-SCNC: 16 MMOL/L (ref 3–16)
ANTIBODY SCREEN: NORMAL
AST SERPL-CCNC: 17 U/L (ref 15–37)
BASOPHILS ABSOLUTE: 0.1 K/UL (ref 0–0.2)
BASOPHILS RELATIVE PERCENT: 0.6 %
BILIRUB SERPL-MCNC: 0.3 MG/DL (ref 0–1)
BILIRUBIN URINE: ABNORMAL
BLOOD, URINE: ABNORMAL
BUN BLDV-MCNC: 39 MG/DL (ref 7–20)
CALCIUM SERPL-MCNC: 8.7 MG/DL (ref 8.3–10.6)
CHLORIDE BLD-SCNC: 91 MMOL/L (ref 99–110)
CLARITY: CLEAR
CO2: 24 MMOL/L (ref 21–32)
COLOR: ABNORMAL
CREAT SERPL-MCNC: 2.4 MG/DL (ref 0.6–1.2)
EOSINOPHILS ABSOLUTE: 0.1 K/UL (ref 0–0.6)
EOSINOPHILS RELATIVE PERCENT: 0.8 %
GFR AFRICAN AMERICAN: 23
GFR NON-AFRICAN AMERICAN: 19
GLOBULIN: 4.9 G/DL
GLUCOSE BLD-MCNC: 145 MG/DL (ref 70–99)
GLUCOSE BLD-MCNC: 244 MG/DL (ref 70–99)
GLUCOSE URINE: ABNORMAL MG/DL
HCT VFR BLD CALC: 28.4 % (ref 36–48)
HEMOGLOBIN: 9 G/DL (ref 12–16)
INR BLD: 1.05 (ref 0.86–1.14)
KETONES, URINE: ABNORMAL MG/DL
LEUKOCYTE ESTERASE, URINE: ABNORMAL
LYMPHOCYTES ABSOLUTE: 1.3 K/UL (ref 1–5.1)
LYMPHOCYTES RELATIVE PERCENT: 9.8 %
MCH RBC QN AUTO: 25.7 PG (ref 26–34)
MCHC RBC AUTO-ENTMCNC: 31.9 G/DL (ref 31–36)
MCV RBC AUTO: 80.7 FL (ref 80–100)
MICROSCOPIC EXAMINATION: YES
MONOCYTES ABSOLUTE: 0.8 K/UL (ref 0–1.3)
MONOCYTES RELATIVE PERCENT: 6.4 %
NEUTROPHILS ABSOLUTE: 10.6 K/UL (ref 1.7–7.7)
NEUTROPHILS RELATIVE PERCENT: 82.4 %
NITRITE, URINE: ABNORMAL
OCCULT BLOOD SCREENING: ABNORMAL
PDW BLD-RTO: 14.5 % (ref 12.4–15.4)
PERFORMED ON: ABNORMAL
PH UA: ABNORMAL (ref 5–8)
PLATELET # BLD: 494 K/UL (ref 135–450)
PMV BLD AUTO: 8.8 FL (ref 5–10.5)
POTASSIUM SERPL-SCNC: 4.5 MMOL/L (ref 3.5–5.1)
PROTEIN UA: ABNORMAL MG/DL
PROTHROMBIN TIME: 12.2 SEC (ref 10–13.2)
RBC # BLD: 3.51 M/UL (ref 4–5.2)
RBC UA: ABNORMAL /HPF (ref 0–2)
SODIUM BLD-SCNC: 131 MMOL/L (ref 136–145)
SPECIFIC GRAVITY UA: 1.02 (ref 1–1.03)
TOTAL PROTEIN: 8 G/DL (ref 6.4–8.2)
URINE REFLEX TO CULTURE: YES
URINE TYPE: ABNORMAL
UROBILINOGEN, URINE: ABNORMAL E.U./DL
WBC # BLD: 12.9 K/UL (ref 4–11)
WBC UA: >100 /HPF (ref 0–5)

## 2019-12-05 PROCEDURE — 80053 COMPREHEN METABOLIC PANEL: CPT

## 2019-12-05 PROCEDURE — 74176 CT ABD & PELVIS W/O CONTRAST: CPT

## 2019-12-05 PROCEDURE — 86850 RBC ANTIBODY SCREEN: CPT

## 2019-12-05 PROCEDURE — 1200000000 HC SEMI PRIVATE

## 2019-12-05 PROCEDURE — 99285 EMERGENCY DEPT VISIT HI MDM: CPT

## 2019-12-05 PROCEDURE — 85025 COMPLETE CBC W/AUTO DIFF WBC: CPT

## 2019-12-05 PROCEDURE — 81001 URINALYSIS AUTO W/SCOPE: CPT

## 2019-12-05 PROCEDURE — 83036 HEMOGLOBIN GLYCOSYLATED A1C: CPT

## 2019-12-05 PROCEDURE — 2580000003 HC RX 258: Performed by: INTERNAL MEDICINE

## 2019-12-05 PROCEDURE — 36415 COLL VENOUS BLD VENIPUNCTURE: CPT

## 2019-12-05 PROCEDURE — 85610 PROTHROMBIN TIME: CPT

## 2019-12-05 PROCEDURE — 87086 URINE CULTURE/COLONY COUNT: CPT

## 2019-12-05 PROCEDURE — 6360000002 HC RX W HCPCS: Performed by: PHYSICIAN ASSISTANT

## 2019-12-05 PROCEDURE — 2580000003 HC RX 258: Performed by: PHYSICIAN ASSISTANT

## 2019-12-05 PROCEDURE — G0328 FECAL BLOOD SCRN IMMUNOASSAY: HCPCS

## 2019-12-05 PROCEDURE — 96365 THER/PROPH/DIAG IV INF INIT: CPT

## 2019-12-05 PROCEDURE — 86900 BLOOD TYPING SEROLOGIC ABO: CPT

## 2019-12-05 PROCEDURE — 6370000000 HC RX 637 (ALT 250 FOR IP): Performed by: INTERNAL MEDICINE

## 2019-12-05 PROCEDURE — 86901 BLOOD TYPING SEROLOGIC RH(D): CPT

## 2019-12-05 RX ORDER — DEXTROSE MONOHYDRATE 50 MG/ML
100 INJECTION, SOLUTION INTRAVENOUS PRN
Status: DISCONTINUED | OUTPATIENT
Start: 2019-12-05 | End: 2019-12-08 | Stop reason: HOSPADM

## 2019-12-05 RX ORDER — ONDANSETRON 2 MG/ML
4 INJECTION INTRAMUSCULAR; INTRAVENOUS EVERY 6 HOURS PRN
Status: DISCONTINUED | OUTPATIENT
Start: 2019-12-05 | End: 2019-12-08 | Stop reason: HOSPADM

## 2019-12-05 RX ORDER — SODIUM CHLORIDE 0.9 % (FLUSH) 0.9 %
10 SYRINGE (ML) INJECTION PRN
Status: DISCONTINUED | OUTPATIENT
Start: 2019-12-05 | End: 2019-12-08 | Stop reason: HOSPADM

## 2019-12-05 RX ORDER — ASPIRIN 81 MG/1
81 TABLET, CHEWABLE ORAL DAILY
Status: DISCONTINUED | OUTPATIENT
Start: 2019-12-06 | End: 2019-12-08 | Stop reason: HOSPADM

## 2019-12-05 RX ORDER — NICOTINE POLACRILEX 4 MG
15 LOZENGE BUCCAL PRN
Status: DISCONTINUED | OUTPATIENT
Start: 2019-12-05 | End: 2019-12-08 | Stop reason: HOSPADM

## 2019-12-05 RX ORDER — SERTRALINE HYDROCHLORIDE 25 MG/1
25 TABLET, FILM COATED ORAL DAILY
COMMUNITY

## 2019-12-05 RX ORDER — AMLODIPINE BESYLATE 5 MG/1
10 TABLET ORAL DAILY
Status: DISCONTINUED | OUTPATIENT
Start: 2019-12-06 | End: 2019-12-08 | Stop reason: HOSPADM

## 2019-12-05 RX ORDER — ACETAMINOPHEN 325 MG/1
650 TABLET ORAL EVERY 4 HOURS PRN
Status: DISCONTINUED | OUTPATIENT
Start: 2019-12-05 | End: 2019-12-08 | Stop reason: HOSPADM

## 2019-12-05 RX ORDER — 0.9 % SODIUM CHLORIDE 0.9 %
1000 INTRAVENOUS SOLUTION INTRAVENOUS ONCE
Status: COMPLETED | OUTPATIENT
Start: 2019-12-05 | End: 2019-12-05

## 2019-12-05 RX ORDER — SODIUM CHLORIDE 9 MG/ML
INJECTION, SOLUTION INTRAVENOUS CONTINUOUS
Status: DISPENSED | OUTPATIENT
Start: 2019-12-05 | End: 2019-12-06

## 2019-12-05 RX ORDER — DEXTROSE MONOHYDRATE 25 G/50ML
12.5 INJECTION, SOLUTION INTRAVENOUS PRN
Status: DISCONTINUED | OUTPATIENT
Start: 2019-12-05 | End: 2019-12-08 | Stop reason: HOSPADM

## 2019-12-05 RX ORDER — SODIUM CHLORIDE 0.9 % (FLUSH) 0.9 %
10 SYRINGE (ML) INJECTION EVERY 12 HOURS SCHEDULED
Status: DISCONTINUED | OUTPATIENT
Start: 2019-12-05 | End: 2019-12-08 | Stop reason: HOSPADM

## 2019-12-05 RX ORDER — ATORVASTATIN CALCIUM 40 MG/1
40 TABLET, FILM COATED ORAL DAILY
Status: DISCONTINUED | OUTPATIENT
Start: 2019-12-06 | End: 2019-12-08 | Stop reason: HOSPADM

## 2019-12-05 RX ORDER — OXYBUTYNIN CHLORIDE 5 MG/1
5 TABLET ORAL 2 TIMES DAILY
Status: DISCONTINUED | OUTPATIENT
Start: 2019-12-05 | End: 2019-12-08 | Stop reason: HOSPADM

## 2019-12-05 RX ADMIN — SODIUM CHLORIDE: 9 INJECTION, SOLUTION INTRAVENOUS at 22:38

## 2019-12-05 RX ADMIN — OXYBUTYNIN CHLORIDE 5 MG: 5 TABLET ORAL at 22:38

## 2019-12-05 RX ADMIN — CEFTRIAXONE SODIUM 1 G: 1 INJECTION, POWDER, FOR SOLUTION INTRAMUSCULAR; INTRAVENOUS at 18:37

## 2019-12-05 RX ADMIN — Medication 10 ML: at 22:38

## 2019-12-05 RX ADMIN — SODIUM CHLORIDE 1000 ML: 9 INJECTION, SOLUTION INTRAVENOUS at 17:27

## 2019-12-05 ASSESSMENT — PAIN SCALES - GENERAL: PAINLEVEL_OUTOF10: 0

## 2019-12-05 ASSESSMENT — ENCOUNTER SYMPTOMS
COLOR CHANGE: 0
ABDOMINAL PAIN: 0
EYES NEGATIVE: 1
VOMITING: 0
NAUSEA: 0
BLOOD IN STOOL: 1
SHORTNESS OF BREATH: 0
COUGH: 0
ABDOMINAL DISTENTION: 0
BACK PAIN: 0

## 2019-12-06 LAB
ALBUMIN SERPL-MCNC: 2.3 G/DL (ref 3.4–5)
ANION GAP SERPL CALCULATED.3IONS-SCNC: 8 MMOL/L (ref 3–16)
BUN BLDV-MCNC: 31 MG/DL (ref 7–20)
CALCIUM SERPL-MCNC: 7.8 MG/DL (ref 8.3–10.6)
CHLORIDE BLD-SCNC: 104 MMOL/L (ref 99–110)
CO2: 23 MMOL/L (ref 21–32)
CREAT SERPL-MCNC: 1.6 MG/DL (ref 0.6–1.2)
ESTIMATED AVERAGE GLUCOSE: 174.3 MG/DL
GFR AFRICAN AMERICAN: 37
GFR NON-AFRICAN AMERICAN: 31
GLUCOSE BLD-MCNC: 108 MG/DL (ref 70–99)
GLUCOSE BLD-MCNC: 111 MG/DL (ref 70–99)
GLUCOSE BLD-MCNC: 113 MG/DL (ref 70–99)
GLUCOSE BLD-MCNC: 87 MG/DL (ref 70–99)
GLUCOSE BLD-MCNC: 97 MG/DL (ref 70–99)
HBA1C MFR BLD: 7.7 %
HCT VFR BLD CALC: 22.7 % (ref 36–48)
HEMOGLOBIN: 7.4 G/DL (ref 12–16)
MCH RBC QN AUTO: 26.5 PG (ref 26–34)
MCHC RBC AUTO-ENTMCNC: 32.8 G/DL (ref 31–36)
MCV RBC AUTO: 80.8 FL (ref 80–100)
PDW BLD-RTO: 14.5 % (ref 12.4–15.4)
PERFORMED ON: ABNORMAL
PERFORMED ON: NORMAL
PHOSPHORUS: 2.7 MG/DL (ref 2.5–4.9)
PLATELET # BLD: 323 K/UL (ref 135–450)
PMV BLD AUTO: 8.6 FL (ref 5–10.5)
POTASSIUM SERPL-SCNC: 4.1 MMOL/L (ref 3.5–5.1)
RBC # BLD: 2.81 M/UL (ref 4–5.2)
SODIUM BLD-SCNC: 135 MMOL/L (ref 136–145)
URINE CULTURE, ROUTINE: NORMAL
WBC # BLD: 10 K/UL (ref 4–11)

## 2019-12-06 PROCEDURE — 7100000010 HC PHASE II RECOVERY - FIRST 15 MIN: Performed by: INTERNAL MEDICINE

## 2019-12-06 PROCEDURE — 80069 RENAL FUNCTION PANEL: CPT

## 2019-12-06 PROCEDURE — 0DJ08ZZ INSPECTION OF UPPER INTESTINAL TRACT, VIA NATURAL OR ARTIFICIAL OPENING ENDOSCOPIC: ICD-10-PCS | Performed by: INTERNAL MEDICINE

## 2019-12-06 PROCEDURE — 6360000002 HC RX W HCPCS: Performed by: INTERNAL MEDICINE

## 2019-12-06 PROCEDURE — 1200000000 HC SEMI PRIVATE

## 2019-12-06 PROCEDURE — 2580000003 HC RX 258: Performed by: INTERNAL MEDICINE

## 2019-12-06 PROCEDURE — 2709999900 HC NON-CHARGEABLE SUPPLY: Performed by: INTERNAL MEDICINE

## 2019-12-06 PROCEDURE — 85027 COMPLETE CBC AUTOMATED: CPT

## 2019-12-06 PROCEDURE — 6370000000 HC RX 637 (ALT 250 FOR IP): Performed by: INTERNAL MEDICINE

## 2019-12-06 PROCEDURE — 36415 COLL VENOUS BLD VENIPUNCTURE: CPT

## 2019-12-06 PROCEDURE — 3609017100 HC EGD: Performed by: INTERNAL MEDICINE

## 2019-12-06 PROCEDURE — 99152 MOD SED SAME PHYS/QHP 5/>YRS: CPT | Performed by: INTERNAL MEDICINE

## 2019-12-06 PROCEDURE — 7100000011 HC PHASE II RECOVERY - ADDTL 15 MIN: Performed by: INTERNAL MEDICINE

## 2019-12-06 RX ORDER — MIDAZOLAM HYDROCHLORIDE 5 MG/ML
INJECTION INTRAMUSCULAR; INTRAVENOUS PRN
Status: DISCONTINUED | OUTPATIENT
Start: 2019-12-06 | End: 2019-12-06 | Stop reason: ALTCHOICE

## 2019-12-06 RX ORDER — FENTANYL CITRATE 50 UG/ML
INJECTION, SOLUTION INTRAMUSCULAR; INTRAVENOUS PRN
Status: DISCONTINUED | OUTPATIENT
Start: 2019-12-06 | End: 2019-12-06 | Stop reason: ALTCHOICE

## 2019-12-06 RX ADMIN — Medication 10 ML: at 21:38

## 2019-12-06 RX ADMIN — AMLODIPINE BESYLATE 10 MG: 5 TABLET ORAL at 08:45

## 2019-12-06 RX ADMIN — SODIUM CHLORIDE: 9 INJECTION, SOLUTION INTRAVENOUS at 06:50

## 2019-12-06 RX ADMIN — ATORVASTATIN CALCIUM 40 MG: 40 TABLET, FILM COATED ORAL at 08:45

## 2019-12-06 RX ADMIN — OXYBUTYNIN CHLORIDE 5 MG: 5 TABLET ORAL at 08:45

## 2019-12-06 RX ADMIN — Medication 10 ML: at 08:46

## 2019-12-06 RX ADMIN — OXYBUTYNIN CHLORIDE 5 MG: 5 TABLET ORAL at 21:38

## 2019-12-06 RX ADMIN — CEFTRIAXONE SODIUM 1 G: 1 INJECTION, POWDER, FOR SOLUTION INTRAMUSCULAR; INTRAVENOUS at 08:46

## 2019-12-06 RX ADMIN — ASPIRIN 81 MG 81 MG: 81 TABLET ORAL at 08:45

## 2019-12-06 RX ADMIN — SERTRALINE HYDROCHLORIDE 25 MG: 50 TABLET ORAL at 08:45

## 2019-12-06 ASSESSMENT — PAIN SCALES - GENERAL
PAINLEVEL_OUTOF10: 0

## 2019-12-07 LAB
ALBUMIN SERPL-MCNC: 2.4 G/DL (ref 3.4–5)
ANION GAP SERPL CALCULATED.3IONS-SCNC: 12 MMOL/L (ref 3–16)
BUN BLDV-MCNC: 22 MG/DL (ref 7–20)
CALCIUM SERPL-MCNC: 8.2 MG/DL (ref 8.3–10.6)
CHLORIDE BLD-SCNC: 104 MMOL/L (ref 99–110)
CO2: 23 MMOL/L (ref 21–32)
CREAT SERPL-MCNC: 1.4 MG/DL (ref 0.6–1.2)
GFR AFRICAN AMERICAN: 43
GFR NON-AFRICAN AMERICAN: 36
GLUCOSE BLD-MCNC: 110 MG/DL (ref 70–99)
GLUCOSE BLD-MCNC: 128 MG/DL (ref 70–99)
GLUCOSE BLD-MCNC: 136 MG/DL (ref 70–99)
GLUCOSE BLD-MCNC: 171 MG/DL (ref 70–99)
GLUCOSE BLD-MCNC: 209 MG/DL (ref 70–99)
HCT VFR BLD CALC: 26.2 % (ref 36–48)
HEMOGLOBIN: 8.3 G/DL (ref 12–16)
MCH RBC QN AUTO: 26.1 PG (ref 26–34)
MCHC RBC AUTO-ENTMCNC: 31.7 G/DL (ref 31–36)
MCV RBC AUTO: 82.3 FL (ref 80–100)
PDW BLD-RTO: 15.3 % (ref 12.4–15.4)
PERFORMED ON: ABNORMAL
PHOSPHORUS: 2.8 MG/DL (ref 2.5–4.9)
PLATELET # BLD: 367 K/UL (ref 135–450)
PMV BLD AUTO: 8.8 FL (ref 5–10.5)
POTASSIUM SERPL-SCNC: 4.7 MMOL/L (ref 3.5–5.1)
RBC # BLD: 3.19 M/UL (ref 4–5.2)
SODIUM BLD-SCNC: 139 MMOL/L (ref 136–145)
WBC # BLD: 10.9 K/UL (ref 4–11)

## 2019-12-07 PROCEDURE — 6360000002 HC RX W HCPCS: Performed by: INTERNAL MEDICINE

## 2019-12-07 PROCEDURE — 80069 RENAL FUNCTION PANEL: CPT

## 2019-12-07 PROCEDURE — 85027 COMPLETE CBC AUTOMATED: CPT

## 2019-12-07 PROCEDURE — 1200000000 HC SEMI PRIVATE

## 2019-12-07 PROCEDURE — 2580000003 HC RX 258: Performed by: INTERNAL MEDICINE

## 2019-12-07 PROCEDURE — 6370000000 HC RX 637 (ALT 250 FOR IP): Performed by: INTERNAL MEDICINE

## 2019-12-07 RX ADMIN — SERTRALINE HYDROCHLORIDE 25 MG: 50 TABLET ORAL at 08:21

## 2019-12-07 RX ADMIN — INSULIN LISPRO 1 UNITS: 100 INJECTION, SOLUTION INTRAVENOUS; SUBCUTANEOUS at 21:00

## 2019-12-07 RX ADMIN — CEFTRIAXONE SODIUM 1 G: 1 INJECTION, POWDER, FOR SOLUTION INTRAMUSCULAR; INTRAVENOUS at 08:21

## 2019-12-07 RX ADMIN — OXYBUTYNIN CHLORIDE 5 MG: 5 TABLET ORAL at 20:58

## 2019-12-07 RX ADMIN — INSULIN LISPRO 2 UNITS: 100 INJECTION, SOLUTION INTRAVENOUS; SUBCUTANEOUS at 12:10

## 2019-12-07 RX ADMIN — Medication 10 ML: at 08:22

## 2019-12-07 RX ADMIN — ATORVASTATIN CALCIUM 40 MG: 40 TABLET, FILM COATED ORAL at 08:21

## 2019-12-07 RX ADMIN — ASPIRIN 81 MG 81 MG: 81 TABLET ORAL at 08:21

## 2019-12-07 RX ADMIN — Medication 10 ML: at 20:59

## 2019-12-07 RX ADMIN — OXYBUTYNIN CHLORIDE 5 MG: 5 TABLET ORAL at 08:21

## 2019-12-07 RX ADMIN — AMLODIPINE BESYLATE 10 MG: 5 TABLET ORAL at 08:21

## 2019-12-07 ASSESSMENT — PAIN SCALES - GENERAL: PAINLEVEL_OUTOF10: 0

## 2019-12-08 VITALS
OXYGEN SATURATION: 90 % | WEIGHT: 113.6 LBS | TEMPERATURE: 98.2 F | RESPIRATION RATE: 18 BRPM | HEIGHT: 66 IN | DIASTOLIC BLOOD PRESSURE: 66 MMHG | SYSTOLIC BLOOD PRESSURE: 130 MMHG | HEART RATE: 99 BPM | BODY MASS INDEX: 18.26 KG/M2

## 2019-12-08 LAB
ANION GAP SERPL CALCULATED.3IONS-SCNC: 10 MMOL/L (ref 3–16)
BUN BLDV-MCNC: 19 MG/DL (ref 7–20)
CALCIUM SERPL-MCNC: 8.2 MG/DL (ref 8.3–10.6)
CHLORIDE BLD-SCNC: 103 MMOL/L (ref 99–110)
CO2: 23 MMOL/L (ref 21–32)
CREAT SERPL-MCNC: 1.5 MG/DL (ref 0.6–1.2)
GFR AFRICAN AMERICAN: 40
GFR NON-AFRICAN AMERICAN: 33
GLUCOSE BLD-MCNC: 118 MG/DL (ref 70–99)
GLUCOSE BLD-MCNC: 121 MG/DL (ref 70–99)
GLUCOSE BLD-MCNC: 129 MG/DL (ref 70–99)
GLUCOSE BLD-MCNC: 174 MG/DL (ref 70–99)
GLUCOSE BLD-MCNC: 238 MG/DL (ref 70–99)
HCT VFR BLD CALC: 25.6 % (ref 36–48)
HEMOGLOBIN: 8.1 G/DL (ref 12–16)
MCH RBC QN AUTO: 25.8 PG (ref 26–34)
MCHC RBC AUTO-ENTMCNC: 31.6 G/DL (ref 31–36)
MCV RBC AUTO: 81.5 FL (ref 80–100)
PDW BLD-RTO: 15.1 % (ref 12.4–15.4)
PERFORMED ON: ABNORMAL
PLATELET # BLD: 345 K/UL (ref 135–450)
PMV BLD AUTO: 8.6 FL (ref 5–10.5)
POTASSIUM REFLEX MAGNESIUM: 4.2 MMOL/L (ref 3.5–5.1)
RBC # BLD: 3.15 M/UL (ref 4–5.2)
SODIUM BLD-SCNC: 136 MMOL/L (ref 136–145)
WBC # BLD: 9.5 K/UL (ref 4–11)

## 2019-12-08 PROCEDURE — 6370000000 HC RX 637 (ALT 250 FOR IP): Performed by: INTERNAL MEDICINE

## 2019-12-08 PROCEDURE — 6360000002 HC RX W HCPCS: Performed by: INTERNAL MEDICINE

## 2019-12-08 PROCEDURE — 80048 BASIC METABOLIC PNL TOTAL CA: CPT

## 2019-12-08 PROCEDURE — 97162 PT EVAL MOD COMPLEX 30 MIN: CPT

## 2019-12-08 PROCEDURE — 85027 COMPLETE CBC AUTOMATED: CPT

## 2019-12-08 PROCEDURE — 2580000003 HC RX 258: Performed by: INTERNAL MEDICINE

## 2019-12-08 PROCEDURE — 97116 GAIT TRAINING THERAPY: CPT

## 2019-12-08 PROCEDURE — 97530 THERAPEUTIC ACTIVITIES: CPT

## 2019-12-08 PROCEDURE — 36415 COLL VENOUS BLD VENIPUNCTURE: CPT

## 2019-12-08 RX ORDER — CIPROFLOXACIN 250 MG/1
250 TABLET, FILM COATED ORAL 2 TIMES DAILY
Qty: 14 TABLET | Refills: 0 | Status: SHIPPED | OUTPATIENT
Start: 2019-12-08 | End: 2019-12-15

## 2019-12-08 RX ADMIN — AMLODIPINE BESYLATE 10 MG: 5 TABLET ORAL at 08:42

## 2019-12-08 RX ADMIN — CEFTRIAXONE SODIUM 1 G: 1 INJECTION, POWDER, FOR SOLUTION INTRAMUSCULAR; INTRAVENOUS at 08:33

## 2019-12-08 RX ADMIN — ATORVASTATIN CALCIUM 40 MG: 40 TABLET, FILM COATED ORAL at 08:42

## 2019-12-08 RX ADMIN — SERTRALINE HYDROCHLORIDE 25 MG: 50 TABLET ORAL at 08:42

## 2019-12-08 RX ADMIN — ASPIRIN 81 MG 81 MG: 81 TABLET ORAL at 08:42

## 2019-12-08 RX ADMIN — OXYBUTYNIN CHLORIDE 5 MG: 5 TABLET ORAL at 08:42

## 2019-12-08 ASSESSMENT — PAIN SCALES - GENERAL
PAINLEVEL_OUTOF10: 0

## 2019-12-09 ENCOUNTER — CARE COORDINATION (OUTPATIENT)
Dept: CARE COORDINATION | Age: 84
End: 2019-12-09

## 2019-12-31 ENCOUNTER — CARE COORDINATION (OUTPATIENT)
Dept: CARE COORDINATION | Age: 84
End: 2019-12-31

## 2020-01-02 ENCOUNTER — CARE COORDINATION (OUTPATIENT)
Dept: CASE MANAGEMENT | Age: 85
End: 2020-01-02

## 2020-01-06 ENCOUNTER — HOSPITAL ENCOUNTER (INPATIENT)
Age: 85
LOS: 1 days | Discharge: NURSING, REHAB OR LONG-TERM CARE | DRG: 682 | End: 2020-01-10
Attending: EMERGENCY MEDICINE | Admitting: INTERNAL MEDICINE
Payer: MEDICARE

## 2020-01-06 ENCOUNTER — APPOINTMENT (OUTPATIENT)
Dept: CT IMAGING | Age: 85
DRG: 682 | End: 2020-01-06
Payer: MEDICARE

## 2020-01-06 ENCOUNTER — APPOINTMENT (OUTPATIENT)
Dept: GENERAL RADIOLOGY | Age: 85
DRG: 682 | End: 2020-01-06
Payer: MEDICARE

## 2020-01-06 PROBLEM — N17.9 AKI (ACUTE KIDNEY INJURY) (HCC): Status: ACTIVE | Noted: 2020-01-06

## 2020-01-06 LAB
A/G RATIO: 0.5 (ref 1.1–2.2)
ALBUMIN SERPL-MCNC: 2.7 G/DL (ref 3.4–5)
ALP BLD-CCNC: 146 U/L (ref 40–129)
ALT SERPL-CCNC: 16 U/L (ref 10–40)
ANION GAP SERPL CALCULATED.3IONS-SCNC: 18 MMOL/L (ref 3–16)
AST SERPL-CCNC: 34 U/L (ref 15–37)
BASOPHILS ABSOLUTE: 0.1 K/UL (ref 0–0.2)
BASOPHILS RELATIVE PERCENT: 0.7 %
BILIRUB SERPL-MCNC: 0.3 MG/DL (ref 0–1)
BUN BLDV-MCNC: 40 MG/DL (ref 7–20)
CALCIUM SERPL-MCNC: 8.4 MG/DL (ref 8.3–10.6)
CHLORIDE BLD-SCNC: 92 MMOL/L (ref 99–110)
CO2: 17 MMOL/L (ref 21–32)
CREAT SERPL-MCNC: 2.6 MG/DL (ref 0.6–1.2)
EOSINOPHILS ABSOLUTE: 0.1 K/UL (ref 0–0.6)
EOSINOPHILS RELATIVE PERCENT: 0.3 %
GFR AFRICAN AMERICAN: 21
GFR NON-AFRICAN AMERICAN: 18
GLOBULIN: 5.7 G/DL
GLUCOSE BLD-MCNC: 264 MG/DL (ref 70–99)
HCT VFR BLD CALC: 33.6 % (ref 36–48)
HEMOGLOBIN: 10.4 G/DL (ref 12–16)
LACTIC ACID, SEPSIS: 2.9 MMOL/L (ref 0.4–1.9)
LYMPHOCYTES ABSOLUTE: 0.5 K/UL (ref 1–5.1)
LYMPHOCYTES RELATIVE PERCENT: 3.2 %
MCH RBC QN AUTO: 24.4 PG (ref 26–34)
MCHC RBC AUTO-ENTMCNC: 31 G/DL (ref 31–36)
MCV RBC AUTO: 78.9 FL (ref 80–100)
MONOCYTES ABSOLUTE: 0.3 K/UL (ref 0–1.3)
MONOCYTES RELATIVE PERCENT: 1.9 %
NEUTROPHILS ABSOLUTE: 14 K/UL (ref 1.7–7.7)
NEUTROPHILS RELATIVE PERCENT: 93.9 %
PDW BLD-RTO: 16.1 % (ref 12.4–15.4)
PLATELET # BLD: 484 K/UL (ref 135–450)
PMV BLD AUTO: 9.1 FL (ref 5–10.5)
POTASSIUM SERPL-SCNC: 5.1 MMOL/L (ref 3.5–5.1)
RBC # BLD: 4.26 M/UL (ref 4–5.2)
SODIUM BLD-SCNC: 127 MMOL/L (ref 136–145)
TOTAL PROTEIN: 8.4 G/DL (ref 6.4–8.2)
TROPONIN: 0.02 NG/ML
WBC # BLD: 14.9 K/UL (ref 4–11)

## 2020-01-06 PROCEDURE — 81001 URINALYSIS AUTO W/SCOPE: CPT

## 2020-01-06 PROCEDURE — 96367 TX/PROPH/DG ADDL SEQ IV INF: CPT

## 2020-01-06 PROCEDURE — 87040 BLOOD CULTURE FOR BACTERIA: CPT

## 2020-01-06 PROCEDURE — 85025 COMPLETE CBC W/AUTO DIFF WBC: CPT

## 2020-01-06 PROCEDURE — 93005 ELECTROCARDIOGRAM TRACING: CPT | Performed by: EMERGENCY MEDICINE

## 2020-01-06 PROCEDURE — 71250 CT THORAX DX C-: CPT

## 2020-01-06 PROCEDURE — 2580000003 HC RX 258: Performed by: EMERGENCY MEDICINE

## 2020-01-06 PROCEDURE — 80053 COMPREHEN METABOLIC PANEL: CPT

## 2020-01-06 PROCEDURE — 83605 ASSAY OF LACTIC ACID: CPT

## 2020-01-06 PROCEDURE — 71045 X-RAY EXAM CHEST 1 VIEW: CPT

## 2020-01-06 PROCEDURE — 87086 URINE CULTURE/COLONY COUNT: CPT

## 2020-01-06 PROCEDURE — 99291 CRITICAL CARE FIRST HOUR: CPT

## 2020-01-06 PROCEDURE — 96365 THER/PROPH/DIAG IV INF INIT: CPT

## 2020-01-06 PROCEDURE — 96366 THER/PROPH/DIAG IV INF ADDON: CPT

## 2020-01-06 PROCEDURE — G0378 HOSPITAL OBSERVATION PER HR: HCPCS

## 2020-01-06 PROCEDURE — 84484 ASSAY OF TROPONIN QUANT: CPT

## 2020-01-06 PROCEDURE — 6370000000 HC RX 637 (ALT 250 FOR IP): Performed by: EMERGENCY MEDICINE

## 2020-01-06 PROCEDURE — 96368 THER/DIAG CONCURRENT INF: CPT

## 2020-01-06 PROCEDURE — 6360000002 HC RX W HCPCS: Performed by: EMERGENCY MEDICINE

## 2020-01-06 RX ORDER — SODIUM CHLORIDE 0.9 % (FLUSH) 0.9 %
10 SYRINGE (ML) INJECTION PRN
Status: DISCONTINUED | OUTPATIENT
Start: 2020-01-06 | End: 2020-01-10 | Stop reason: HOSPADM

## 2020-01-06 RX ORDER — 0.9 % SODIUM CHLORIDE 0.9 %
500 INTRAVENOUS SOLUTION INTRAVENOUS ONCE
Status: COMPLETED | OUTPATIENT
Start: 2020-01-06 | End: 2020-01-06

## 2020-01-06 RX ORDER — SODIUM CHLORIDE 0.9 % (FLUSH) 0.9 %
10 SYRINGE (ML) INJECTION EVERY 12 HOURS SCHEDULED
Status: DISCONTINUED | OUTPATIENT
Start: 2020-01-06 | End: 2020-01-10 | Stop reason: HOSPADM

## 2020-01-06 RX ORDER — ASPIRIN 81 MG/1
324 TABLET, CHEWABLE ORAL ONCE
Status: COMPLETED | OUTPATIENT
Start: 2020-01-06 | End: 2020-01-06

## 2020-01-06 RX ORDER — INSULIN GLARGINE 100 [IU]/ML
10 INJECTION, SOLUTION SUBCUTANEOUS NIGHTLY
Status: CANCELLED | OUTPATIENT
Start: 2020-01-06

## 2020-01-06 RX ADMIN — SODIUM CHLORIDE 500 ML: 9 INJECTION, SOLUTION INTRAVENOUS at 22:54

## 2020-01-06 RX ADMIN — VANCOMYCIN HYDROCHLORIDE 1250 MG: 10 INJECTION, POWDER, LYOPHILIZED, FOR SOLUTION INTRAVENOUS at 22:54

## 2020-01-06 RX ADMIN — Medication 10 ML: at 22:55

## 2020-01-06 RX ADMIN — CEFEPIME HYDROCHLORIDE 2 G: 2 INJECTION, POWDER, FOR SOLUTION INTRAVENOUS at 22:54

## 2020-01-06 RX ADMIN — ASPIRIN 81 MG 324 MG: 81 TABLET ORAL at 22:21

## 2020-01-07 PROBLEM — E43 SEVERE MALNUTRITION (HCC): Chronic | Status: ACTIVE | Noted: 2020-01-07

## 2020-01-07 PROBLEM — N30.01 ACUTE CYSTITIS WITH HEMATURIA: Status: ACTIVE | Noted: 2020-01-07

## 2020-01-07 LAB
ANION GAP SERPL CALCULATED.3IONS-SCNC: 15 MMOL/L (ref 3–16)
BASOPHILS ABSOLUTE: 0 K/UL (ref 0–0.2)
BASOPHILS RELATIVE PERCENT: 0.3 %
BILIRUBIN URINE: NEGATIVE
BLOOD, URINE: ABNORMAL
BUN BLDV-MCNC: 29 MG/DL (ref 7–20)
CALCIUM SERPL-MCNC: 8.1 MG/DL (ref 8.3–10.6)
CHLORIDE BLD-SCNC: 93 MMOL/L (ref 99–110)
CLARITY: ABNORMAL
CO2: 22 MMOL/L (ref 21–32)
COLOR: YELLOW
COMMENT UA: ABNORMAL
CREAT SERPL-MCNC: 1.8 MG/DL (ref 0.6–1.2)
EKG ATRIAL RATE: 127 BPM
EKG DIAGNOSIS: NORMAL
EKG P AXIS: 67 DEGREES
EKG P-R INTERVAL: 128 MS
EKG Q-T INTERVAL: 320 MS
EKG QRS DURATION: 68 MS
EKG QTC CALCULATION (BAZETT): 465 MS
EKG R AXIS: 30 DEGREES
EKG T AXIS: 76 DEGREES
EKG VENTRICULAR RATE: 127 BPM
EOSINOPHILS ABSOLUTE: 0 K/UL (ref 0–0.6)
EOSINOPHILS RELATIVE PERCENT: 0.3 %
GFR AFRICAN AMERICAN: 33
GFR NON-AFRICAN AMERICAN: 27
GLUCOSE BLD-MCNC: 138 MG/DL (ref 70–99)
GLUCOSE BLD-MCNC: 155 MG/DL (ref 70–99)
GLUCOSE BLD-MCNC: 174 MG/DL (ref 70–99)
GLUCOSE BLD-MCNC: 185 MG/DL (ref 70–99)
GLUCOSE BLD-MCNC: 219 MG/DL (ref 70–99)
GLUCOSE URINE: NEGATIVE MG/DL
HCT VFR BLD CALC: 29.3 % (ref 36–48)
HEMOGLOBIN: 9.2 G/DL (ref 12–16)
KETONES, URINE: ABNORMAL MG/DL
LACTIC ACID, SEPSIS: 2.9 MMOL/L (ref 0.4–1.9)
LACTIC ACID: 2.7 MMOL/L (ref 0.4–2)
LEUKOCYTE ESTERASE, URINE: ABNORMAL
LYMPHOCYTES ABSOLUTE: 0.7 K/UL (ref 1–5.1)
LYMPHOCYTES RELATIVE PERCENT: 4.7 %
MAGNESIUM: 1.6 MG/DL (ref 1.8–2.4)
MCH RBC QN AUTO: 24.3 PG (ref 26–34)
MCHC RBC AUTO-ENTMCNC: 31.4 G/DL (ref 31–36)
MCV RBC AUTO: 77.6 FL (ref 80–100)
MICROSCOPIC EXAMINATION: YES
MONOCYTES ABSOLUTE: 0.3 K/UL (ref 0–1.3)
MONOCYTES RELATIVE PERCENT: 2.2 %
NEUTROPHILS ABSOLUTE: 12.8 K/UL (ref 1.7–7.7)
NEUTROPHILS RELATIVE PERCENT: 92.5 %
NITRITE, URINE: NEGATIVE
PDW BLD-RTO: 15.4 % (ref 12.4–15.4)
PERFORMED ON: ABNORMAL
PH UA: 6 (ref 5–8)
PLATELET # BLD: 422 K/UL (ref 135–450)
PMV BLD AUTO: 8.2 FL (ref 5–10.5)
POTASSIUM SERPL-SCNC: 4 MMOL/L (ref 3.5–5.1)
PROCALCITONIN: 0.33 NG/ML (ref 0–0.15)
PROTEIN UA: 100 MG/DL
RBC # BLD: 3.78 M/UL (ref 4–5.2)
RBC UA: ABNORMAL /HPF (ref 0–2)
SODIUM BLD-SCNC: 130 MMOL/L (ref 136–145)
SPECIFIC GRAVITY UA: 1.02 (ref 1–1.03)
TROPONIN: <0.01 NG/ML
TSH SERPL DL<=0.05 MIU/L-ACNC: 3.71 UIU/ML (ref 0.27–4.2)
URINE REFLEX TO CULTURE: YES
URINE TYPE: ABNORMAL
UROBILINOGEN, URINE: 0.2 E.U./DL
WBC # BLD: 13.9 K/UL (ref 4–11)
WBC UA: >100 /HPF (ref 0–5)

## 2020-01-07 PROCEDURE — 96367 TX/PROPH/DG ADDL SEQ IV INF: CPT

## 2020-01-07 PROCEDURE — 83605 ASSAY OF LACTIC ACID: CPT

## 2020-01-07 PROCEDURE — 6360000002 HC RX W HCPCS: Performed by: INTERNAL MEDICINE

## 2020-01-07 PROCEDURE — 84145 PROCALCITONIN (PCT): CPT

## 2020-01-07 PROCEDURE — G0378 HOSPITAL OBSERVATION PER HR: HCPCS

## 2020-01-07 PROCEDURE — 84443 ASSAY THYROID STIM HORMONE: CPT

## 2020-01-07 PROCEDURE — 36415 COLL VENOUS BLD VENIPUNCTURE: CPT

## 2020-01-07 PROCEDURE — 85025 COMPLETE CBC W/AUTO DIFF WBC: CPT

## 2020-01-07 PROCEDURE — 6370000000 HC RX 637 (ALT 250 FOR IP): Performed by: NURSE PRACTITIONER

## 2020-01-07 PROCEDURE — 83735 ASSAY OF MAGNESIUM: CPT

## 2020-01-07 PROCEDURE — 84484 ASSAY OF TROPONIN QUANT: CPT

## 2020-01-07 PROCEDURE — 2580000003 HC RX 258: Performed by: INTERNAL MEDICINE

## 2020-01-07 PROCEDURE — 80048 BASIC METABOLIC PNL TOTAL CA: CPT

## 2020-01-07 PROCEDURE — 83036 HEMOGLOBIN GLYCOSYLATED A1C: CPT

## 2020-01-07 PROCEDURE — 2580000003 HC RX 258: Performed by: EMERGENCY MEDICINE

## 2020-01-07 PROCEDURE — 96366 THER/PROPH/DIAG IV INF ADDON: CPT

## 2020-01-07 PROCEDURE — 6370000000 HC RX 637 (ALT 250 FOR IP): Performed by: INTERNAL MEDICINE

## 2020-01-07 RX ORDER — PANTOPRAZOLE SODIUM 40 MG/1
40 TABLET, DELAYED RELEASE ORAL
Status: DISCONTINUED | OUTPATIENT
Start: 2020-01-07 | End: 2020-01-08

## 2020-01-07 RX ORDER — PROMETHAZINE HYDROCHLORIDE 25 MG/1
12.5 TABLET ORAL EVERY 4 HOURS PRN
Status: DISCONTINUED | OUTPATIENT
Start: 2020-01-07 | End: 2020-01-10 | Stop reason: HOSPADM

## 2020-01-07 RX ORDER — LACTOBACILLUS RHAMNOSUS GG 10B CELL
1 CAPSULE ORAL
Status: DISCONTINUED | OUTPATIENT
Start: 2020-01-07 | End: 2020-01-10 | Stop reason: HOSPADM

## 2020-01-07 RX ORDER — NICOTINE POLACRILEX 4 MG
15 LOZENGE BUCCAL PRN
Status: DISCONTINUED | OUTPATIENT
Start: 2020-01-07 | End: 2020-01-10 | Stop reason: HOSPADM

## 2020-01-07 RX ORDER — ATORVASTATIN CALCIUM 40 MG/1
40 TABLET, FILM COATED ORAL NIGHTLY
Status: DISCONTINUED | OUTPATIENT
Start: 2020-01-07 | End: 2020-01-10 | Stop reason: HOSPADM

## 2020-01-07 RX ORDER — ACETAMINOPHEN 325 MG/1
650 TABLET ORAL EVERY 4 HOURS PRN
Status: DISCONTINUED | OUTPATIENT
Start: 2020-01-07 | End: 2020-01-10 | Stop reason: HOSPADM

## 2020-01-07 RX ORDER — ONDANSETRON 2 MG/ML
4 INJECTION INTRAMUSCULAR; INTRAVENOUS EVERY 4 HOURS PRN
Status: DISCONTINUED | OUTPATIENT
Start: 2020-01-07 | End: 2020-01-10 | Stop reason: HOSPADM

## 2020-01-07 RX ORDER — MIRTAZAPINE 15 MG/1
7.5 TABLET, FILM COATED ORAL NIGHTLY
COMMUNITY

## 2020-01-07 RX ORDER — AMLODIPINE BESYLATE 5 MG/1
10 TABLET ORAL DAILY
Status: DISCONTINUED | OUTPATIENT
Start: 2020-01-07 | End: 2020-01-10 | Stop reason: HOSPADM

## 2020-01-07 RX ORDER — SODIUM CHLORIDE, SODIUM LACTATE, POTASSIUM CHLORIDE, AND CALCIUM CHLORIDE .6; .31; .03; .02 G/100ML; G/100ML; G/100ML; G/100ML
1000 INJECTION, SOLUTION INTRAVENOUS ONCE
Status: COMPLETED | OUTPATIENT
Start: 2020-01-07 | End: 2020-01-07

## 2020-01-07 RX ORDER — ACETAMINOPHEN 650 MG/1
650 SUPPOSITORY RECTAL EVERY 4 HOURS PRN
Status: DISCONTINUED | OUTPATIENT
Start: 2020-01-07 | End: 2020-01-10 | Stop reason: HOSPADM

## 2020-01-07 RX ORDER — ONDANSETRON 4 MG/1
4 TABLET, ORALLY DISINTEGRATING ORAL EVERY 4 HOURS PRN
Status: DISCONTINUED | OUTPATIENT
Start: 2020-01-07 | End: 2020-01-10 | Stop reason: HOSPADM

## 2020-01-07 RX ORDER — PANTOPRAZOLE SODIUM 40 MG/1
40 TABLET, DELAYED RELEASE ORAL DAILY
COMMUNITY

## 2020-01-07 RX ORDER — MIRTAZAPINE 15 MG/1
7.5 TABLET, FILM COATED ORAL NIGHTLY
Status: DISCONTINUED | OUTPATIENT
Start: 2020-01-07 | End: 2020-01-10 | Stop reason: HOSPADM

## 2020-01-07 RX ORDER — ACETAMINOPHEN 160 MG/5ML
650 SOLUTION ORAL EVERY 4 HOURS PRN
Status: DISCONTINUED | OUTPATIENT
Start: 2020-01-07 | End: 2020-01-10 | Stop reason: HOSPADM

## 2020-01-07 RX ORDER — ONDANSETRON 4 MG/1
4 TABLET, FILM COATED ORAL EVERY 8 HOURS PRN
COMMUNITY

## 2020-01-07 RX ORDER — MEGESTROL ACETATE 40 MG/ML
200 SUSPENSION ORAL DAILY
Status: DISCONTINUED | OUTPATIENT
Start: 2020-01-07 | End: 2020-01-07

## 2020-01-07 RX ORDER — DRONABINOL 2.5 MG/1
2.5 CAPSULE ORAL 2 TIMES DAILY
Status: DISCONTINUED | OUTPATIENT
Start: 2020-01-07 | End: 2020-01-10 | Stop reason: HOSPADM

## 2020-01-07 RX ORDER — DEXTROSE MONOHYDRATE 25 G/50ML
12.5 INJECTION, SOLUTION INTRAVENOUS PRN
Status: DISCONTINUED | OUTPATIENT
Start: 2020-01-07 | End: 2020-01-10 | Stop reason: HOSPADM

## 2020-01-07 RX ORDER — DEXTROSE MONOHYDRATE 50 MG/ML
100 INJECTION, SOLUTION INTRAVENOUS PRN
Status: DISCONTINUED | OUTPATIENT
Start: 2020-01-07 | End: 2020-01-10 | Stop reason: HOSPADM

## 2020-01-07 RX ORDER — PROMETHAZINE HYDROCHLORIDE 6.25 MG/5ML
12.5 SYRUP ORAL EVERY 4 HOURS PRN
Status: DISCONTINUED | OUTPATIENT
Start: 2020-01-07 | End: 2020-01-10 | Stop reason: HOSPADM

## 2020-01-07 RX ORDER — AMLODIPINE BESYLATE 10 MG/1
10 TABLET ORAL DAILY
Status: ON HOLD | COMMUNITY
End: 2020-01-10 | Stop reason: HOSPADM

## 2020-01-07 RX ORDER — PROMETHAZINE HYDROCHLORIDE 25 MG/ML
12.5 INJECTION, SOLUTION INTRAMUSCULAR; INTRAVENOUS EVERY 4 HOURS PRN
Status: DISCONTINUED | OUTPATIENT
Start: 2020-01-07 | End: 2020-01-10 | Stop reason: HOSPADM

## 2020-01-07 RX ORDER — HEPARIN SODIUM 5000 [USP'U]/ML
5000 INJECTION, SOLUTION INTRAVENOUS; SUBCUTANEOUS EVERY 8 HOURS SCHEDULED
Status: DISCONTINUED | OUTPATIENT
Start: 2020-01-08 | End: 2020-01-10 | Stop reason: HOSPADM

## 2020-01-07 RX ORDER — SODIUM CHLORIDE 0.9 % (FLUSH) 0.9 %
10 SYRINGE (ML) INJECTION PRN
Status: DISCONTINUED | OUTPATIENT
Start: 2020-01-07 | End: 2020-01-10 | Stop reason: HOSPADM

## 2020-01-07 RX ORDER — CALCIUM CARBONATE 200(500)MG
1000 TABLET,CHEWABLE ORAL 3 TIMES DAILY PRN
Status: DISCONTINUED | OUTPATIENT
Start: 2020-01-07 | End: 2020-01-10 | Stop reason: HOSPADM

## 2020-01-07 RX ADMIN — SERTRALINE HYDROCHLORIDE 25 MG: 50 TABLET ORAL at 10:53

## 2020-01-07 RX ADMIN — ATORVASTATIN CALCIUM 40 MG: 40 TABLET, FILM COATED ORAL at 22:12

## 2020-01-07 RX ADMIN — PANTOPRAZOLE SODIUM 40 MG: 40 TABLET, DELAYED RELEASE ORAL at 05:31

## 2020-01-07 RX ADMIN — Medication 10 ML: at 22:15

## 2020-01-07 RX ADMIN — CEFTRIAXONE SODIUM 1 G: 1 INJECTION, POWDER, FOR SOLUTION INTRAMUSCULAR; INTRAVENOUS at 10:53

## 2020-01-07 RX ADMIN — INSULIN LISPRO 1 UNITS: 100 INJECTION, SOLUTION INTRAVENOUS; SUBCUTANEOUS at 22:13

## 2020-01-07 RX ADMIN — INSULIN LISPRO 2 UNITS: 100 INJECTION, SOLUTION INTRAVENOUS; SUBCUTANEOUS at 13:33

## 2020-01-07 RX ADMIN — SODIUM CHLORIDE, POTASSIUM CHLORIDE, SODIUM LACTATE AND CALCIUM CHLORIDE 1000 ML: 600; 310; 30; 20 INJECTION, SOLUTION INTRAVENOUS at 01:15

## 2020-01-07 RX ADMIN — DRONABINOL 2.5 MG: 2.5 CAPSULE ORAL at 17:54

## 2020-01-07 RX ADMIN — MIRTAZAPINE 7.5 MG: 15 TABLET, FILM COATED ORAL at 22:13

## 2020-01-07 RX ADMIN — SODIUM CHLORIDE, POTASSIUM CHLORIDE, SODIUM LACTATE AND CALCIUM CHLORIDE 1000 ML: 600; 310; 30; 20 INJECTION, SOLUTION INTRAVENOUS at 05:30

## 2020-01-07 RX ADMIN — Medication 10 ML: at 10:55

## 2020-01-07 ASSESSMENT — PAIN SCALES - GENERAL
PAINLEVEL_OUTOF10: 0
PAINLEVEL_OUTOF10: 2
PAINLEVEL_OUTOF10: 0
PAINLEVEL_OUTOF10: 2
PAINLEVEL_OUTOF10: 0
PAINLEVEL_OUTOF10: 2

## 2020-01-07 ASSESSMENT — PAIN SCALES - PAIN ASSESSMENT IN ADVANCED DEMENTIA (PAINAD)
NEGVOCALIZATION: 0
CONSOLABILITY: 0
BREATHING: 0
FACIALEXPRESSION: 0
TOTALSCORE: 0
BODYLANGUAGE: 0

## 2020-01-07 ASSESSMENT — PAIN DESCRIPTION - LOCATION
LOCATION: BACK
LOCATION: ABDOMEN
LOCATION: ABDOMEN

## 2020-01-07 ASSESSMENT — PAIN DESCRIPTION - PAIN TYPE: TYPE: CHRONIC PAIN

## 2020-01-07 NOTE — H&P
Hospital Medicine History & Physical      Patient:  Sarah Ahmadi  :   1935  MRN:   [id number]  Date of Service: 20    CHIEF COMPLAINT:  Poor appetite, chest pain    HISTORY OF PRESENT ILLNESS:    Sarah Ahmadi is a 82 y.o. female. She presents from her ECF for several reasons. She has advanced dementia and is able to provide only limited historical details. She is accompanied by her son & daughter. They relate her appetite and oral intake have been on the decline over the past 3 or so months. She has lost roughly 20 lbs from  to . She was roughly 140 lbs at the beginning of . Over the past 3-4 days she has had essentially nothing to eat and minimal amounts to drink. At some point today she expressed that she was having chest pain which led to an ambulance being called. She denies chest pain currently. She recalls feeling pain over the lower sternum off to the right. She now complains of lower abdominal pain. She has been unable to produce a urine specimen. Review of Systems:  All pertinent positives and negatives are as noted in the HPI section. All other systems were reviewed and are negative. Past Medical History:   Diagnosis Date    DJD (degenerative joint disease)     Eczema     Elevated LFTs     HBP (high blood pressure)     Hyperlipidemia     NIDDM (non-insulin dependent diabetes mellitus)     Scoliosis        Past Surgical History:   Procedure Laterality Date    BLADDER SUSPENSION      CHOLECYSTECTOMY      COLONOSCOPY      COLONOSCOPY  3/30/16    tics    JOINT REPLACEMENT  11    rigth total hip replacement with cell saver and platelet gel.  TONSILLECTOMY AND ADENOIDECTOMY      UPPER GASTROINTESTINAL ENDOSCOPY N/A 2019    EGD performed by Whitney Dillard MD at [facility]           Prior to Admission medications    Medication Sig Start Date End Date Taking?  Authorizing Provider   sertraline (ZOLOFT) 25 MG tablet Take 25 mg by mouth daily    Historical Provider, MD   lidocaine 4 % external patch Place 1 patch onto the skin daily 8/25/19   Baljeet Devi MD   Multiple Vitamins-Minerals (VISION VITAMINS PO) Take 1 tablet by mouth daily    Historical Provider, MD   insulin glargine (TOUJEO SOLOSTAR) 300 UNIT/ML injection pen Inject 10 Units into the skin daily  Patient taking differently: Inject 12 Units into the skin daily  6/27/19   REI Bob CNP   amLODIPine (NORVASC) 10 MG tablet Take 1 tablet by mouth daily 5/16/19   Rasheeda Steinberg MD   oxybutynin (DITROPAN) 5 MG tablet TAKE ONE TABLET BY MOUTH Two TIMES A DAY 5/8/19   REI Bob CNP   canagliflozin [facility]) 100 MG TABS tablet Take 1 tablet by mouth daily 5/8/19   REI Bob CNP   atorvastatin (LIPITOR) 40 MG tablet TAKE ONE TABLET BY MOUTH DAILY 5/8/19   REI Bob CNP       Allergies:   Metformin and related; Darvocet [propoxyphene n-acetaminophen]; and Propoxyphene    Social:   reports that she has never smoked. She has never used smokeless tobacco.   reports previous alcohol use. Social History     Substance and Sexual Activity   Drug Use No       Family History   Problem Relation Age of Onset    Cancer Mother     Heart Disease Father     Cancer Sister        PHYSICAL EXAM:  I performed this physical examination. Vitals:  Patient Vitals for the past 24 hrs:   BP Temp Temp src Pulse Resp SpO2 Height Weight   01/06/20 2235 (!) 136/55 -- -- 102 24 97 % -- --   01/06/20 2054 (!) 137/50 -- -- 108 18 98 % -- --   01/06/20 2008 137/69 97.6 °F (36.4 °C) Oral 116 18 96 % 5' 5\" (1.651 m) 113 lb (51.3 kg)     No intake or output data in the 24 hours ending 01/06/20 2259    Vent Settings:     / / /     GEN:  Appearance:  Elderly female . Flat affect. Slow responses. Level of Consciousness:  Alert. Orientation:  Self only    HEENT: Sclera anicteric.  no conjunctival chemosis. moist mucus membranes.     no specific or diagnostic oral lesions. NECK:  no signs of meningismus. Jugular veins not distended. Carotid pulses  2+.  no cervical lymphadenopathy. no thyromegaly. CV:  regular rhythm. normal S1 & S2.    no murmur. no rub.  no gallop. PULM:  Chest excursion is symmetric. Breath sounds are vesicular. Crackles are absent. Wheezes are absent. Rubs are absent. AB:  Abdominal shape is scaphoid. Bowel sounds are active. Generally soft to palpation. suprapubic tenderness is present. no involuntary guarding. no rebound guarding. Negative falcon's    EXTR:  Skin is warm. Capillary refill brisk. no specific or pathognomic rash. no clubbing. no pitting edema. no active wound or ulcer. LABS:  Lab Results   Component Value Date    WBC 14.9 (H) 01/06/2020    HGB 10.4 (L) 01/06/2020    HCT 33.6 (L) 01/06/2020    MCV 78.9 (L) 01/06/2020     (H) 01/06/2020     Lab Results   Component Value Date    CREATININE 2.6 (H) 01/06/2020    BUN 40 (H) 01/06/2020     (L) 01/06/2020    K 5.1 01/06/2020    CL 92 (L) 01/06/2020    CO2 17 (L) 01/06/2020     Lab Results   Component Value Date    ALT 16 01/06/2020    AST 34 01/06/2020    ALKPHOS 146 (H) 01/06/2020    BILITOT 0.3 01/06/2020     Lab Results   Component Value Date    CKTOTAL 69 08/18/2019    TROPONINI 0.02 (H) 01/06/2020     No results for input(s): PHART, QTP7GPH, PO2ART in the last 72 hours. IMAGING:  Ct Chest Wo Contrast    Result Date: 1/6/2020  EXAMINATION: CT OF THE CHEST WITHOUT CONTRAST 1/6/2020 6:28 pm TECHNIQUE: CT of the chest was performed without the administration of intravenous contrast. Multiplanar reformatted images are provided for review. Dose modulation, iterative reconstruction, and/or weight based adjustment of the mA/kV was utilized to reduce the radiation dose to as low as reasonably achievable. COMPARISON: None.  HISTORY: ORDERING SYSTEM PROVIDED HISTORY: chest pain, tachycardia, cough

## 2020-01-07 NOTE — PROGRESS NOTES
Hospitalist Progress Note      PCP: Jeanine Calderon MD    Date of Admission: 1/6/2020    Chief Complaint:   Chief Complaint   Patient presents with    Chest Pain     mid sternal cp starting this afternoon; from the [facility] Hx: Dementia; given 1 nitro by EMS; denies pain now. Hospital Course: Jeevan Boyd is a 82 y.o. female. She presents from her F for several reasons. She has advanced dementia and is able to provide only limited historical details. She is accompanied by her son & daughter. They relate her appetite and oral intake have been on the decline over the past 3 or so months. She has lost roughly 20 lbs from 11/11 to 12/30. She was roughly 140 lbs at the beginning of 2019. Over the past 3-4 days she has had essentially nothing to eat and minimal amounts to drink. At some point today she expressed that she was having chest pain which led to an ambulance being called. She denies chest pain currently. She recalls feeling pain over the lower sternum off to the right. She now complains of lower abdominal pain. She has been unable to produce a urine specimen. Subjective:   Pt in bed. Her son and daughter are present bedside. They voiced concern that [facility] pursued a consult here in the hospital , without their consent. They have asked them to be removed from her case. The patient answers some yes/no questions, mainly at the direction of her son. He is amenable to discussions with palliative care. She is off O2. Pending ST eval. No sig complaints other than lack of appetite. They are requesting appetite stimulant. Discussed side effects. They wish to pursue. DC plan:  ECF, memory care. Do not wish to return to the [facility]. No new medical concerns  Bedside rounding with nursing completed.     Medications:  Reviewed    Infusion Medications    dextrose       Scheduled Medications    amLODIPine  10 mg Oral Daily    atorvastatin  40 mg

## 2020-01-07 NOTE — PROGRESS NOTES
Sent secure message to Jillian Meza NP via perfect serve Was pt to have referral for speech eval?  Do not see one ordered.   Thank you

## 2020-01-07 NOTE — CONSULTS
Nutrition Assessment    Type and Reason for Visit: Initial, Consult    Nutrition Recommendations:   Liberalize diet to general per protocol  Add ensure and magic cups BID  Monitor POC (hospice and palliative consults placed)    Nutrition Assessment: Consult for nutrition assessment for myles score. Nutritionally compromised AEB unintentional wt loss, very poor appetite, possible aspiration PNA on CT. History of severe dementia and pt non communicative today. Spoke with family x2. They report progressive decline over past month with regards to pts nutrition status. Family asking for diet liberalization and agree with trial of supplements although they state pt usually does not consume them. Family also state they do not want a long term feeding tube if it came to that. Noted, hospice consult. Will liberalize diet per protocol and continue to monitor. Malnutrition Assessment:  · Malnutrition Status: Meets the criteria for severe malnutrition  · Context: Chronic illness  · Findings of the 6 clinical characteristics of malnutrition (Minimum of 2 out of 6 clinical characteristics is required to make the diagnosis of moderate or severe Protein Calorie Malnutrition based on AND/ASPEN Guidelines):  1. Energy Intake-Less than or equal to 75% of estimated energy requirement, Greater than or equal to 3 months    2. Weight Loss-20% loss or greater, in 6 months  3. Fat Loss-Severe subcutaneous fat loss, Triceps, Orbital  4. Muscle Loss-Severe muscle mass loss, Interosseous, Temples (temporalis muscle), Clavicles (pectoralis and deltoids)  5. Fluid Accumulation-Unable to assess,    6.   Strength-Not measured    Nutrition Risk Level: High    Nutrient Needs:  · Estimated Daily Total Kcal: 0251-1918  · Estimated Daily Protein (g): 47-56 gm   · Estimated Daily Total Fluid (ml/day): 1 mL/kcal     Nutrition Diagnosis:   · Problem: Severe malnutrition  · Etiology: related to Insufficient energy/nutrient consumption  Signs and symptoms:  as evidenced by Weight loss, Diet history of poor intake    Objective Information:  · Nutrition-Focused Physical Findings: see malnutrition assessment   · Wound Type: None  · Current Nutrition Therapies:  · Oral Diet Orders: Carb Control 4 Carbs/Meal   · Oral Diet intake: 0%, 1-25%  · Anthropometric Measures:  · Ht: 5' 6\" (167.6 cm)   · Current Body Wt: 102 lb (46.3 kg)  · Usual Body Wt: 130 lb (59 kg)  · % Weight Change:  ,  -28 lbs. 22% wt loss x past 5 months  · Ideal Body Wt: 130 lb (59 kg)   · BMI Classification: BMI <18.5 Underweight    Nutrition Interventions:   Start ONS, Modify current diet  Continued Inpatient Monitoring    Nutrition Evaluation:   · Evaluation: Goals set   · Goals: Tolerate current diet     · Monitoring: Nutrition Progression, Meal Intake, Supplement Intake      Electronically signed by Rafa Alfonso RD, LD on 1/7/20 at 2:41 PM    Contact Number: [phone]

## 2020-01-07 NOTE — ED NOTES
Liban Glow at bedside; notified of elevated Lactic Acid; notified of need of fluids; see orders.       Aba Padilla RN  01/07/20 2957

## 2020-01-07 NOTE — ED PROVIDER NOTES
[facility]  ED PROVIDER NOTE    Patient Identification  Pt Name: Kishore Dickey  MRN: [id number]  Nurygfjorge 1935  Date of evaluation: 1/6/2020  Provider: Sonia Nielsen MD  PCP: Sadia Bradley MD    Chief Complaint  Chest Pain (mid sternal cp starting this afternoon; from the [facility]IC Hx: Dementia; given 1 nitro by EMS; denies pain now. )      HPI  History provided by patient   This is a 82 y.o. female who presents to the ED for chest pain. Began today. History also obtained from family at bedside. Patient has history of dementia and does not seem to want to answer open-ended questions. She has some nausea. No leg pain. No shortness of breath. Has been having unchanged cough for over a month. Has had decreased appetite and is not drinking much on the way of fluid. Denies dysuria. Laura Wiseman ROS  10 systems reviewed, pertinent positives/negatives per HPI otherwise noted to be negative. I have reviewed the following nursing documentation:  Allergies: Metformin and related; Darvocet [propoxyphene n-acetaminophen]; and Propoxyphene    Past medical history:   Past Medical History:   Diagnosis Date    DJD (degenerative joint disease)     Eczema     Elevated LFTs     HBP (high blood pressure)     Hyperlipidemia     NIDDM (non-insulin dependent diabetes mellitus)     Scoliosis      Past surgical history:   Past Surgical History:   Procedure Laterality Date    BLADDER SUSPENSION      CHOLECYSTECTOMY      COLONOSCOPY      COLONOSCOPY  3/30/16    tics    JOINT REPLACEMENT  4/12/11    rigth total hip replacement with cell saver and platelet gel.     TONSILLECTOMY AND ADENOIDECTOMY      UPPER GASTROINTESTINAL ENDOSCOPY N/A 12/6/2019    EGD performed by Tami Abraham MD at [address] [place] [address] medications:   Previous Medications    AMLODIPINE (NORVASC) 10 MG TABLET    Take 1 tablet by mouth daily    ATORVASTATIN (LIPITOR) 40 MG TABLET filling defects   identified within the left lower lobe and lingular airways, which may   represent mucous plugging versus aspiration         XR CHEST PORTABLE   Final Result   Left lower lobe airspace opacity could represent scarring, atelectasis or   pneumonia             Labs  Results for orders placed or performed during the hospital encounter of 01/06/20   CBC Auto Differential   Result Value Ref Range    WBC 14.9 (H) 4.0 - 11.0 K/uL    RBC 4.26 4.00 - 5.20 M/uL    Hemoglobin 10.4 (L) 12.0 - 16.0 g/dL    Hematocrit 33.6 (L) 36.0 - 48.0 %    MCV 78.9 (L) 80.0 - 100.0 fL    MCH 24.4 (L) 26.0 - 34.0 pg    MCHC 31.0 31.0 - 36.0 g/dL    RDW 16.1 (H) 12.4 - 15.4 %    Platelets 195 (H) 108 - 450 K/uL    MPV 9.1 5.0 - 10.5 fL    Neutrophils % 93.9 %    Lymphocytes % 3.2 %    Monocytes % 1.9 %    Eosinophils % 0.3 %    Basophils % 0.7 %    Neutrophils Absolute 14.0 (H) 1.7 - 7.7 K/uL    Lymphocytes Absolute 0.5 (L) 1.0 - 5.1 K/uL    Monocytes Absolute 0.3 0.0 - 1.3 K/uL    Eosinophils Absolute 0.1 0.0 - 0.6 K/uL    Basophils Absolute 0.1 0.0 - 0.2 K/uL   Comprehensive Metabolic Panel   Result Value Ref Range    Sodium 127 (L) 136 - 145 mmol/L    Potassium 5.1 3.5 - 5.1 mmol/L    Chloride 92 (L) 99 - 110 mmol/L    CO2 17 (L) 21 - 32 mmol/L    Anion Gap 18 (H) 3 - 16    Glucose 264 (H) 70 - 99 mg/dL    BUN 40 (H) 7 - 20 mg/dL    CREATININE 2.6 (H) 0.6 - 1.2 mg/dL    GFR Non-African American 18 (A) >60    GFR  21 (A) >60    Calcium 8.4 8.3 - 10.6 mg/dL    Total Protein 8.4 (H) 6.4 - 8.2 g/dL    Alb 2.7 (L) 3.4 - 5.0 g/dL    Albumin/Globulin Ratio 0.5 (L) 1.1 - 2.2    Total Bilirubin 0.3 0.0 - 1.0 mg/dL    Alkaline Phosphatase 146 (H) 40 - 129 U/L    ALT 16 10 - 40 U/L    AST 34 15 - 37 U/L    Globulin 5.7 g/dL   Troponin   Result Value Ref Range    Troponin 0.02 (H) <0.01 ng/mL       Screenings           MDM and ED Course  Patient is an 82-year-old woman with past medical history of dementia who presents MD  01/06/20 6817

## 2020-01-07 NOTE — ED NOTES
Bed: 18  Expected date:   Expected time:   Means of arrival:   Comments:  CP/Dementia     Neil Nguyen RN  01/06/20 2000

## 2020-01-08 ENCOUNTER — APPOINTMENT (OUTPATIENT)
Dept: GENERAL RADIOLOGY | Age: 85
DRG: 682 | End: 2020-01-08
Payer: MEDICARE

## 2020-01-08 LAB
ANION GAP SERPL CALCULATED.3IONS-SCNC: 15 MMOL/L (ref 3–16)
BASOPHILS ABSOLUTE: 0 K/UL (ref 0–0.2)
BASOPHILS RELATIVE PERCENT: 0.4 %
BUN BLDV-MCNC: 23 MG/DL (ref 7–20)
CALCIUM SERPL-MCNC: 8.3 MG/DL (ref 8.3–10.6)
CHLORIDE BLD-SCNC: 96 MMOL/L (ref 99–110)
CO2: 21 MMOL/L (ref 21–32)
CREAT SERPL-MCNC: 1.5 MG/DL (ref 0.6–1.2)
EOSINOPHILS ABSOLUTE: 0.1 K/UL (ref 0–0.6)
EOSINOPHILS RELATIVE PERCENT: 1.2 %
ESTIMATED AVERAGE GLUCOSE: 165.7 MG/DL
GFR AFRICAN AMERICAN: 40
GFR NON-AFRICAN AMERICAN: 33
GLUCOSE BLD-MCNC: 148 MG/DL (ref 70–99)
GLUCOSE BLD-MCNC: 149 MG/DL (ref 70–99)
GLUCOSE BLD-MCNC: 150 MG/DL (ref 70–99)
GLUCOSE BLD-MCNC: 151 MG/DL (ref 70–99)
GLUCOSE BLD-MCNC: 174 MG/DL (ref 70–99)
HBA1C MFR BLD: 7.4 %
HCT VFR BLD CALC: 29.3 % (ref 36–48)
HEMOGLOBIN: 9.1 G/DL (ref 12–16)
LYMPHOCYTES ABSOLUTE: 0.7 K/UL (ref 1–5.1)
LYMPHOCYTES RELATIVE PERCENT: 6.4 %
MAGNESIUM: 1.7 MG/DL (ref 1.8–2.4)
MCH RBC QN AUTO: 24.3 PG (ref 26–34)
MCHC RBC AUTO-ENTMCNC: 31 G/DL (ref 31–36)
MCV RBC AUTO: 78.5 FL (ref 80–100)
MONOCYTES ABSOLUTE: 0.4 K/UL (ref 0–1.3)
MONOCYTES RELATIVE PERCENT: 3.2 %
NEUTROPHILS ABSOLUTE: 10 K/UL (ref 1.7–7.7)
NEUTROPHILS RELATIVE PERCENT: 88.8 %
PDW BLD-RTO: 15.6 % (ref 12.4–15.4)
PERFORMED ON: ABNORMAL
PLATELET # BLD: 299 K/UL (ref 135–450)
PMV BLD AUTO: 8.9 FL (ref 5–10.5)
POTASSIUM SERPL-SCNC: 4.4 MMOL/L (ref 3.5–5.1)
RBC # BLD: 3.73 M/UL (ref 4–5.2)
SODIUM BLD-SCNC: 132 MMOL/L (ref 136–145)
URINE CULTURE, ROUTINE: NORMAL
WBC # BLD: 11.2 K/UL (ref 4–11)

## 2020-01-08 PROCEDURE — G0378 HOSPITAL OBSERVATION PER HR: HCPCS

## 2020-01-08 PROCEDURE — 6360000002 HC RX W HCPCS: Performed by: NURSE PRACTITIONER

## 2020-01-08 PROCEDURE — 96376 TX/PRO/DX INJ SAME DRUG ADON: CPT

## 2020-01-08 PROCEDURE — 6370000000 HC RX 637 (ALT 250 FOR IP): Performed by: NURSE PRACTITIONER

## 2020-01-08 PROCEDURE — 6370000000 HC RX 637 (ALT 250 FOR IP): Performed by: INTERNAL MEDICINE

## 2020-01-08 PROCEDURE — C9113 INJ PANTOPRAZOLE SODIUM, VIA: HCPCS | Performed by: NURSE PRACTITIONER

## 2020-01-08 PROCEDURE — 36415 COLL VENOUS BLD VENIPUNCTURE: CPT

## 2020-01-08 PROCEDURE — 92610 EVALUATE SWALLOWING FUNCTION: CPT

## 2020-01-08 PROCEDURE — 96372 THER/PROPH/DIAG INJ SC/IM: CPT

## 2020-01-08 PROCEDURE — 96375 TX/PRO/DX INJ NEW DRUG ADDON: CPT

## 2020-01-08 PROCEDURE — 80048 BASIC METABOLIC PNL TOTAL CA: CPT

## 2020-01-08 PROCEDURE — 2580000003 HC RX 258: Performed by: INTERNAL MEDICINE

## 2020-01-08 PROCEDURE — 92526 ORAL FUNCTION THERAPY: CPT

## 2020-01-08 PROCEDURE — 85025 COMPLETE CBC W/AUTO DIFF WBC: CPT

## 2020-01-08 PROCEDURE — 6360000002 HC RX W HCPCS: Performed by: INTERNAL MEDICINE

## 2020-01-08 PROCEDURE — 71045 X-RAY EXAM CHEST 1 VIEW: CPT

## 2020-01-08 PROCEDURE — 2580000003 HC RX 258: Performed by: NURSE PRACTITIONER

## 2020-01-08 PROCEDURE — 2580000003 HC RX 258: Performed by: EMERGENCY MEDICINE

## 2020-01-08 PROCEDURE — 83735 ASSAY OF MAGNESIUM: CPT

## 2020-01-08 RX ORDER — SODIUM CHLORIDE 9 MG/ML
INJECTION, SOLUTION INTRAVENOUS CONTINUOUS
Status: DISCONTINUED | OUTPATIENT
Start: 2020-01-08 | End: 2020-01-09

## 2020-01-08 RX ORDER — PANTOPRAZOLE SODIUM 40 MG/10ML
40 INJECTION, POWDER, LYOPHILIZED, FOR SOLUTION INTRAVENOUS DAILY
Status: DISCONTINUED | OUTPATIENT
Start: 2020-01-08 | End: 2020-01-10 | Stop reason: HOSPADM

## 2020-01-08 RX ADMIN — INSULIN LISPRO 1 UNITS: 100 INJECTION, SOLUTION INTRAVENOUS; SUBCUTANEOUS at 21:34

## 2020-01-08 RX ADMIN — SODIUM CHLORIDE: 9 INJECTION, SOLUTION INTRAVENOUS at 22:19

## 2020-01-08 RX ADMIN — PANTOPRAZOLE SODIUM 40 MG: 40 TABLET, DELAYED RELEASE ORAL at 06:23

## 2020-01-08 RX ADMIN — HEPARIN SODIUM 5000 UNITS: 5000 INJECTION INTRAVENOUS; SUBCUTANEOUS at 06:22

## 2020-01-08 RX ADMIN — PANTOPRAZOLE SODIUM 40 MG: 40 INJECTION, POWDER, FOR SOLUTION INTRAVENOUS at 11:27

## 2020-01-08 RX ADMIN — DRONABINOL 2.5 MG: 2.5 CAPSULE ORAL at 21:19

## 2020-01-08 RX ADMIN — INSULIN LISPRO 1 UNITS: 100 INJECTION, SOLUTION INTRAVENOUS; SUBCUTANEOUS at 17:49

## 2020-01-08 RX ADMIN — CEFTRIAXONE SODIUM 1 G: 1 INJECTION, POWDER, FOR SOLUTION INTRAMUSCULAR; INTRAVENOUS at 09:05

## 2020-01-08 RX ADMIN — HEPARIN SODIUM 5000 UNITS: 5000 INJECTION INTRAVENOUS; SUBCUTANEOUS at 14:44

## 2020-01-08 RX ADMIN — Medication 10 ML: at 21:20

## 2020-01-08 RX ADMIN — MIRTAZAPINE 7.5 MG: 15 TABLET, FILM COATED ORAL at 21:19

## 2020-01-08 RX ADMIN — HEPARIN SODIUM 5000 UNITS: 5000 INJECTION INTRAVENOUS; SUBCUTANEOUS at 21:21

## 2020-01-08 RX ADMIN — ATORVASTATIN CALCIUM 40 MG: 40 TABLET, FILM COATED ORAL at 21:19

## 2020-01-08 RX ADMIN — Medication 10 ML: at 09:04

## 2020-01-08 ASSESSMENT — PAIN SCALES - GENERAL
PAINLEVEL_OUTOF10: 0

## 2020-01-08 NOTE — PROGRESS NOTES
Hospitalist Progress Note      PCP: Zoë Sears MD    Date of Admission: 1/6/2020    Chief Complaint:   Chief Complaint   Patient presents with    Chest Pain     mid sternal cp starting this afternoon; from the [facility] Hx: Dementia; given 1 nitro by EMS; denies pain now. Hospital Course: Julia Albert is a 82 y.o. female. She presents from her ECF for several reasons. She has advanced dementia and is able to provide only limited historical details. She is accompanied by her son & daughter. They relate her appetite and oral intake have been on the decline over the past 3 or so months. She has lost roughly 20 lbs from 11/11 to 12/30. She was roughly 140 lbs at the beginning of 2019. Over the past 3-4 days she has had essentially nothing to eat and minimal amounts to drink. At some point today she expressed that she was having chest pain which led to an ambulance being called. She denies chest pain currently. She recalls feeling pain over the lower sternum off to the right. She now complains of lower abdominal pain. She has been unable to produce a urine specimen. Subjective:   Pt in bed. Her son is present bedside. She did not pass her swallow eval today. Her son feels that it was because she was tired, and he was not present. He requested a repeat eval while he is bedside.   (see ACP note for further discussion)    DC plan:  ECF, memory care. Do not wish to return to the [facility]. No additional medical concerns  Bedside rounding with nursing completed.     Medications:  Reviewed    Infusion Medications    dextrose       Scheduled Medications    pantoprazole  40 mg Intravenous Daily    [Held by provider] amLODIPine  10 mg Oral Daily    atorvastatin  40 mg Oral Nightly    sertraline  25 mg Oral Daily    cefTRIAXone (ROCEPHIN) IV  1 g Intravenous Q24H    insulin lispro  0-6 Units Subcutaneous TID WC    insulin lispro  0-3 Units Subcutaneous Nightly    mirtazapine  7.5 mg Oral Nightly    lactobacillus  1 capsule Oral Daily with breakfast    dronabinol  2.5 mg Oral BID    heparin (porcine)  5,000 Units Subcutaneous 3 times per day    sodium chloride flush  10 mL Intravenous 2 times per day     PRN Meds: sodium chloride flush, glucose, dextrose, glucagon (rDNA), dextrose, acetaminophen, acetaminophen, acetaminophen, ondansetron, ondansetron, promethazine, promethazine, promethazine, melatonin ER, calcium carbonate, sodium chloride flush      Intake/Output Summary (Last 24 hours) at 1/8/2020 1832  Last data filed at 1/8/2020 1448  Gross per 24 hour   Intake 120 ml   Output 0 ml   Net 120 ml       Physical Exam Performed:    /62   Pulse 98   Temp 98.3 °F (36.8 °C) (Oral)   Resp 16   Ht 5' 6\" (1.676 m)   Wt 101 lb 14.4 oz (46.2 kg)   SpO2 94%   BMI 16.45 kg/m²     General appearance: Elderly female lying in bed, No apparent distress, appears stated age   HEENT: Pupils equal, round, and reactive to light. Conjunctivae/corneas clear. Neck: Supple, with full range of motion. No jugular venous distention. Trachea midline. Respiratory:  Normal respiratory effort. Clear to auscultation, bilaterally without Rales/Wheezes/Rhonchi. Cardiovascular: Regular rate and rhythm with normal S1/S2 without murmurs, rubs or gallops. Abdomen: Soft, non-tender, non-distended with normal bowel sounds. Musculoskeletal: No clubbing, cyanosis or edema bilaterally. Full range of motion without deformity. Skin: Skin color, poor texture, turgor normal.  No rashes or lesions. Neurologic:  Neurovascularly intact without any focal sensory/motor deficits.  Cranial nerves: II-XII intact, grossly non-focal.  Psychiatric: LAUREEN   Capillary Refill: Brisk,< 3 seconds   Peripheral Pulses: +2 palpable, equal bilaterally       Labs:   Recent Labs     01/06/20 2053 01/07/20  0955 01/08/20  0932   WBC 14.9* 13.9* 11.2*   HGB 10.4* 9.2* 9.1*   HCT 33.6* 29.3* 29.3*   * 422 299 Recent Labs     01/06/20 2053 01/07/20  0955 01/08/20  0932   * 130* 132*   K 5.1 4.0 4.4   CL 92* 93* 96*   CO2 17* 22 21   BUN 40* 29* 23*   CREATININE 2.6* 1.8* 1.5*   CALCIUM 8.4 8.1* 8.3     Recent Labs     01/06/20 2053   AST 34   ALT 16   BILITOT 0.3   ALKPHOS 146*     No results for input(s): INR in the last 72 hours. Recent Labs     01/06/20 2053 01/07/20  0014   TROPONINI 0.02* <0.01       Urinalysis:      Lab Results   Component Value Date    NITRU Negative 01/06/2020    WBCUA >100 01/06/2020    BACTERIA 3+ 08/17/2019    RBCUA see below 01/06/2020    BLOODU LARGE 01/06/2020    SPECGRAV 1.025 01/06/2020    GLUCOSEU Negative 01/06/2020    GLUCOSEU NEGATIVE 04/01/2011       Radiology:  XR CHEST 1 VW   Final Result   1. No acute abnormality. CT CHEST WO CONTRAST   Final Result   Mild bronchiectasis within the lungs bilaterally. There filling defects   identified within the left lower lobe and lingular airways, which may   represent mucous plugging versus aspiration         XR CHEST PORTABLE   Final Result   Left lower lobe airspace opacity could represent scarring, atelectasis or   pneumonia                 Assessment/Plan:    Active Hospital Problems    Diagnosis Date Noted    Acute cystitis with hematuria [N30.01] 01/07/2020    Severe malnutrition ([address] [E43] 01/07/2020    OSCAR (acute kidney injury) ([address] [N17.9] 01/06/2020    Hyponatremia [E87.1] 12/05/2019    Vascular dementia with behavior disturbance ([address] [F01.51] 03/26/2019    Type 2 diabetes mellitus without complication ([address] [B19.3] 01/27/2016     1. OSCAR superimposed on CKD 2/2 profound dehydration, malnutrition  Presenting Cr was 2.6, now 1.5 (baseline appears to be ~1.5)  Her poor intake could be 2/2 worsening vascular dementia or pneumonia. Continue gentle hydration. Repeat AM labs. 2.? Pneumonia 2/2 aspiration, present on arrival  Elevated pct, with evidence of filling defects on CT, c/w mucus plugging vs asp. Given reports of coughing after feeds and pills, aspiration is a  Possibility. Re-Consult ST for eval. Palliative consulted. Monitor CBC    3.UTI, persistent  Urine Cx pending, though her cx never seem to grow out. Will follow. Continue Rocephin, day 2    4. Vascular dementia  Appears to be worsening. Family had inquired about hospice services, but were not considering partnering. They have questions about her status, what is decline vs acute. We discussed the benefit of palliative consult. They are agreeable. Continue Marinol at the families request.     DVT Prophylaxis: heparin SQ   Diet: DIET GENERAL;  Dietary Nutrition Supplements: Standard High Calorie Oral Supplement, Frozen Oral Supplement  Code Status: DNR-CC    PT/OT Eval Status: active and ongoing      Dispo - unclear. 1-2 days.      Melinda Gonzalez, APRN - CNP

## 2020-01-08 NOTE — CONSULTS
Inpatient consult to Palliative Care  Consult performed by: Garrett Sampson, RN  Consult ordered by: Leah Hernandez, APRN - CNP  Reason for consult: [address]            Palliative Care Initial Note  Palliative Care Admit date:  1/8/2020    Advance Directives:  Reviewed the AD's in this EMR and pt designated her son, Zane Bob, as her healthcare proxy. Pt has a DNR-CC code status. Plan of care/goals:   Exhaustive mtg @ BS w/ pt (briefly) and son/dtr. They had opportunity for d/w SLP prior to writer's visit and son is encouraging pt to agree to [facility] though pt has indicated that she does not wish to have that test.  She has her lunch tray in front of her and took only bites before falling asleep. Pts dtr expressed to son that she feels pt is \"tired\" and has \"given up. \"      We spoke @ length as to the natural progression of dementia and eventual anticipated compromise to swallowing or desire not to eat (pt exhibiting both). We also discussed, if they are not ready to shift the care goals to comfort, pt needing to have a reliable source for nutrition and, if SLP rec's NPO, would they elect a PEG. Son was clear and he referenced pts Living Will, that no artificial nutrition is desired. Son also believes that pt would not desire to have her food textures modifiedor her liquids thickened. Hence, writer discussed option of having hospice support. Social/Spiritual:  Pt has been a resident @ the [facility], first in their AL section then she tried SNF but was moved down to the nsg section because, according to the dtr, \"mom refused to eat and receive care. \"  They shared \"that's the point when the (faciltiy) began to talk about hospice. \"  Son has suggested moving pt to a new facility and d/c pt to the new facility, yet he has no choices in mind. Writer questioned if it might not be worth son having a d/w [facility] to air his grievances/concerns given pt has lived there for at least six months and has become familiar w/ staff?

## 2020-01-08 NOTE — PROGRESS NOTES
Sent secure message to Hung Villaseñor NP via perfect serve Per Speech recommendation pt to be NPO. Oral medication held this AM.  Do you want to change pt to NPO?  Do you want to see if speech is able to come back for eval?  Please advise   Thank you

## 2020-01-08 NOTE — PROGRESS NOTES
Speech Language Pathology  Facility/Department: [facility] A1 REMOTE TELEMETRY  Dysphagia Daily Treatment / Re-Evaluation Note      Recommendations:  Solid Consistency: NPO pending MBSS  Liquid Consistency: NPO pending MBSS  Medication: NPO exception of crushable meds crushed in puree   *However, after extensive discussion with pt and pt's family -- pt declines MBSS (family also encouraged pt). Per ST recs, recommend NPO with completion of MBSS. SLP reviewed safest and least restrictive diet would be Puree and honey-thick liquids. However, after discussion and education with pt and pt's family, pt wishes Regular solid diet with thin liquids despite aspiration risk. RN and NP aware. ST to continue to follow.       NAME: Julia Albert  : 1935  MRN: [id number]    Patient Diagnosis(es):   Patient Active Problem List    Diagnosis Date Noted    DM (diabetes mellitus) ([address] 2011     Priority: High    Status post hip replacement 2011     Priority: High    HTN (hypertension), benign      Priority: Medium    Acute cystitis with hematuria 2020    Severe malnutrition ([address] 2020    OSCAR (acute kidney injury) ([address] 2020    GI bleed 2019    Anemia 2019    Hyponatremia 2019    ARF (acute renal failure) ([address] 2019    Moderate protein-calorie malnutrition ([address] 2019    Vascular dementia with behavior disturbance ([address] 2019    Delirious     Dementia due to Alzheimer's disease ([address]     Dyslipidemia     Acute encephalopathy 2019    Moderate malnutrition ([address] 2019    Subclinical hypothyroidism 2018    Primary osteoarthritis of left hip 2017    DDD (degenerative disc disease), lumbar 2017    History of total right hip replacement 2017    Essential hypertension 2016    Type 2 diabetes mellitus without complication ([address] 9120    Hyperlipidemia 2016    Ganglion cyst 10/02/2013    Scoliosis     Elevated LFTs     HTN (hypertension) 04/13/2011    Osteoarthritis 04/13/2011     Allergies: Allergies   Allergen Reactions    Metformin And Related Other (See Comments)     diarrhea     Darvocet [Propoxyphene N-Acetaminophen] Nausea And Vomiting    Propoxyphene Nausea And Vomiting     Subjective: Pt seen upright in bed, initially asleep. Pt's son and daughter at bedside -- encouraging pt to participate as well. RN OK'd SLP entry and called SLP earlier this date to request re-evaluation. Pain: No c/o pain during session    Current Diet: DIET GENERAL;  Dietary Nutrition Supplements: Standard High Calorie Oral Supplement, Frozen Oral Supplement    Diet Tolerance:  Patient NOT tolerating current diet level without signs/symptoms of penetration / aspiration. P.O. Trials: Thin       Nectar / Mildly Thick   x x5 tsp, x2 small cup sip (assisted by SLP)   Honey / Moderately Thick   x x3 tsp, x2 small cup sip (assisted by SLP)   Pudding / Extremely Thick       Puree   x x4 1/2 tsp puree   Solid   x x1 small bite guillermo cracker     Dysphagia Treatment and Impressions:  Pt currently on RA. She demonstrated adequate XAVIER for PO trials (required initial encouragement from SLP and family at bedside to participate / maintain adequate XAVIER). Pt observed with nectar-thick (tsp, cup), honey-thick (tsp, cup), puree, and x1 trial of regular solid. Pt frequently holding bolus in oral cavity, requiring max cues to swallow from SLP and family. Per pt's son, \"she's just being stubborn\". Intermittent delayed and immediate, dry throat clear noted post-swallow with all PO consistencies -- difficult whether to discern if this is r/t PO intake or baseline. Per pt's family, the pt \"always does that\". Pt's RR consistent at 24/min before, during, and after PO trials. Pt is considered at a higher risk of aspiration with all PO intake d/t deconditioning, concern for aspiration upon admission.   Pt would benefit min / 1 unit     Signature:  Wallace Godinez M.S. [facility]  Speech-language pathologist  [phone]

## 2020-01-08 NOTE — PROGRESS NOTES
End of shift report given to Jm Epstein RN at bedside. Patient alert and oriented. Bed in lowest position with wheels locked. Call light within reach. No further needs at this time.

## 2020-01-08 NOTE — PROGRESS NOTES
in puree   Recommendations: Dysphagia treatment;NPO  Therapeutic Interventions: Diet tolerance monitoring;Patient/Family education    Compensatory Swallowing Strategies   Frequent oral care    Treatment/Goals  Short-term Goals  Timeframe for Short-term Goals: 5 days (1/13/20)   Long-term Goals  Timeframe for Long-term Goals: 7 days (1/15/20)   Dysphagia Goals: The patient will tolerate repeat BSE when able. General  Chart Reviewed: Yes  Comments: \"What is the concern\"  Subjective  Subjective: Pt asleep upon arrival, able to adequate awaken for PO trials, however, appeared tired. Behavior/Cognition: Alert; Cooperative; Requires cueing  Respiratory Status: Room air  Breath Sounds: Clear  O2 Device: None (Room air)  Communication Observation: Functional  Follows Directions: Simple  Dentition: Adequate  Patient Positioning: Upright in bed  Baseline Vocal Quality: Normal  Volitional Cough: Strong  Prior Dysphagia History: No prior SLP services per chart review. Per MD note, family reports pt has had decline in appetite over the last 3 months. Consistencies Administered: Thin - teaspoon;Nectar - teaspoon       Vision/Hearing  Hearing  Hearing: Within functional limits    Oral Motor Deficits  Oral/Motor  Oral Motor: Within functional limits    Oral Phase Dysfunction  Oral Phase  Oral Phase: Exceptions  Oral Phase Dysfunction  Suspected Premature Bolus Loss: Thin - teaspoon;Nectar - teaspoon  Oral Phase  Oral Phase - Comment: Mild oral phase deficits with suspected premature bolus loss of thin liquids and nectar thick liquids via tsp. Indicators of Pharyngeal Phase Dysfunction   Pharyngeal Phase  Pharyngeal Phase: Exceptions  Indicators of Pharyngeal Phase Dysfunction  Delayed Swallow: All  Decreased Laryngeal Elevation: All  Throat Clearing - Delayed: Nectar - teaspoon  Cough - Immediate:  Thin - teaspoon  Pharyngeal Phase   Pharyngeal: Mod pharyngeal phase deficits with reduced laryngeal elevation upon

## 2020-01-09 ENCOUNTER — APPOINTMENT (OUTPATIENT)
Dept: GENERAL RADIOLOGY | Age: 85
DRG: 682 | End: 2020-01-09
Payer: MEDICARE

## 2020-01-09 LAB
ANION GAP SERPL CALCULATED.3IONS-SCNC: 14 MMOL/L (ref 3–16)
BASOPHILS ABSOLUTE: 0.1 K/UL (ref 0–0.2)
BASOPHILS RELATIVE PERCENT: 0.6 %
BUN BLDV-MCNC: 23 MG/DL (ref 7–20)
CALCIUM SERPL-MCNC: 7.9 MG/DL (ref 8.3–10.6)
CHLORIDE BLD-SCNC: 101 MMOL/L (ref 99–110)
CO2: 20 MMOL/L (ref 21–32)
CREAT SERPL-MCNC: 1.4 MG/DL (ref 0.6–1.2)
EOSINOPHILS ABSOLUTE: 0.4 K/UL (ref 0–0.6)
EOSINOPHILS RELATIVE PERCENT: 4.3 %
GFR AFRICAN AMERICAN: 43
GFR NON-AFRICAN AMERICAN: 36
GLUCOSE BLD-MCNC: 132 MG/DL (ref 70–99)
GLUCOSE BLD-MCNC: 137 MG/DL (ref 70–99)
GLUCOSE BLD-MCNC: 143 MG/DL (ref 70–99)
GLUCOSE BLD-MCNC: 198 MG/DL (ref 70–99)
GLUCOSE BLD-MCNC: 237 MG/DL (ref 70–99)
HCT VFR BLD CALC: 26.9 % (ref 36–48)
HEMOGLOBIN: 8.4 G/DL (ref 12–16)
LYMPHOCYTES ABSOLUTE: 0.8 K/UL (ref 1–5.1)
LYMPHOCYTES RELATIVE PERCENT: 9.4 %
MAGNESIUM: 1.5 MG/DL (ref 1.8–2.4)
MCH RBC QN AUTO: 24.4 PG (ref 26–34)
MCHC RBC AUTO-ENTMCNC: 31.3 G/DL (ref 31–36)
MCV RBC AUTO: 78 FL (ref 80–100)
MONOCYTES ABSOLUTE: 0.5 K/UL (ref 0–1.3)
MONOCYTES RELATIVE PERCENT: 5.4 %
NEUTROPHILS ABSOLUTE: 7 K/UL (ref 1.7–7.7)
NEUTROPHILS RELATIVE PERCENT: 80.3 %
PDW BLD-RTO: 15.4 % (ref 12.4–15.4)
PERFORMED ON: ABNORMAL
PLATELET # BLD: 218 K/UL (ref 135–450)
PMV BLD AUTO: 8.7 FL (ref 5–10.5)
POTASSIUM SERPL-SCNC: 4.1 MMOL/L (ref 3.5–5.1)
RBC # BLD: 3.45 M/UL (ref 4–5.2)
SODIUM BLD-SCNC: 135 MMOL/L (ref 136–145)
WBC # BLD: 8.7 K/UL (ref 4–11)

## 2020-01-09 PROCEDURE — 6370000000 HC RX 637 (ALT 250 FOR IP): Performed by: INTERNAL MEDICINE

## 2020-01-09 PROCEDURE — 6360000002 HC RX W HCPCS: Performed by: INTERNAL MEDICINE

## 2020-01-09 PROCEDURE — 36415 COLL VENOUS BLD VENIPUNCTURE: CPT

## 2020-01-09 PROCEDURE — 80048 BASIC METABOLIC PNL TOTAL CA: CPT

## 2020-01-09 PROCEDURE — 96376 TX/PRO/DX INJ SAME DRUG ADON: CPT

## 2020-01-09 PROCEDURE — 83735 ASSAY OF MAGNESIUM: CPT

## 2020-01-09 PROCEDURE — 74230 X-RAY XM SWLNG FUNCJ C+: CPT

## 2020-01-09 PROCEDURE — 6370000000 HC RX 637 (ALT 250 FOR IP): Performed by: NURSE PRACTITIONER

## 2020-01-09 PROCEDURE — C9113 INJ PANTOPRAZOLE SODIUM, VIA: HCPCS | Performed by: NURSE PRACTITIONER

## 2020-01-09 PROCEDURE — 6360000002 HC RX W HCPCS: Performed by: NURSE PRACTITIONER

## 2020-01-09 PROCEDURE — 92611 MOTION FLUOROSCOPY/SWALLOW: CPT

## 2020-01-09 PROCEDURE — G0378 HOSPITAL OBSERVATION PER HR: HCPCS

## 2020-01-09 PROCEDURE — 92526 ORAL FUNCTION THERAPY: CPT

## 2020-01-09 PROCEDURE — 2580000003 HC RX 258: Performed by: INTERNAL MEDICINE

## 2020-01-09 PROCEDURE — 2580000003 HC RX 258: Performed by: EMERGENCY MEDICINE

## 2020-01-09 PROCEDURE — 85025 COMPLETE CBC W/AUTO DIFF WBC: CPT

## 2020-01-09 PROCEDURE — 2580000003 HC RX 258: Performed by: NURSE PRACTITIONER

## 2020-01-09 PROCEDURE — 96372 THER/PROPH/DIAG INJ SC/IM: CPT

## 2020-01-09 RX ADMIN — PANTOPRAZOLE SODIUM 40 MG: 40 INJECTION, POWDER, FOR SOLUTION INTRAVENOUS at 08:42

## 2020-01-09 RX ADMIN — Medication 1 CAPSULE: at 08:43

## 2020-01-09 RX ADMIN — INSULIN LISPRO 1 UNITS: 100 INJECTION, SOLUTION INTRAVENOUS; SUBCUTANEOUS at 21:48

## 2020-01-09 RX ADMIN — HEPARIN SODIUM 5000 UNITS: 5000 INJECTION INTRAVENOUS; SUBCUTANEOUS at 05:39

## 2020-01-09 RX ADMIN — ATORVASTATIN CALCIUM 40 MG: 40 TABLET, FILM COATED ORAL at 20:11

## 2020-01-09 RX ADMIN — MIRTAZAPINE 7.5 MG: 15 TABLET, FILM COATED ORAL at 20:11

## 2020-01-09 RX ADMIN — DRONABINOL 2.5 MG: 2.5 CAPSULE ORAL at 20:11

## 2020-01-09 RX ADMIN — Medication 10 ML: at 08:42

## 2020-01-09 RX ADMIN — DRONABINOL 2.5 MG: 2.5 CAPSULE ORAL at 09:44

## 2020-01-09 RX ADMIN — Medication 10 ML: at 20:17

## 2020-01-09 RX ADMIN — CEFTRIAXONE SODIUM 1 G: 1 INJECTION, POWDER, FOR SOLUTION INTRAMUSCULAR; INTRAVENOUS at 08:43

## 2020-01-09 RX ADMIN — HEPARIN SODIUM 5000 UNITS: 5000 INJECTION INTRAVENOUS; SUBCUTANEOUS at 14:47

## 2020-01-09 RX ADMIN — SODIUM CHLORIDE: 9 INJECTION, SOLUTION INTRAVENOUS at 06:19

## 2020-01-09 RX ADMIN — SERTRALINE HYDROCHLORIDE 25 MG: 50 TABLET ORAL at 08:42

## 2020-01-09 ASSESSMENT — PAIN SCALES - WONG BAKER: WONGBAKER_NUMERICALRESPONSE: 0

## 2020-01-09 ASSESSMENT — PAIN SCALES - GENERAL
PAINLEVEL_OUTOF10: 0
PAINLEVEL_OUTOF10: 0

## 2020-01-09 NOTE — PROGRESS NOTES
Patient resting in bed. Family at bedside. RN received report from previous RN. Patient in stable condition. Per previous shift RN, patient is not eating and the recommended diet \"dysphagia soft, bite size and honey thick liquids\" was reportedly refused by family. Family and patient denies any needs. RN will continue to monitor.

## 2020-01-09 NOTE — PROGRESS NOTES
RN paged MD    Family at the bedside, stating that you had told them you'd be returning to the bedside to speak with them. They have multiple questions. Can you come to the bedside? Please and thank you.

## 2020-01-09 NOTE — PROCEDURES
is [facility] with the exception of prolonged mastication of regular solid food trials without oral residuals post swallow. Moderate pharyngeal phase dysphagia characterized by silent tracheal aspiration of thin liquids during swallow, and deep laryngeal penetration of nectar thick liquids during swallow. Pt follows cues to cough following aspiration of thin liquids, and cough clears aspirated material. Pt will not follow commands to cough following laryngeal penetration of nectar thick liquids. There is no laryngeal penetration nor aspiration of honey thick liquids nor the solid food trials given this date. There is moderate vallecular pooling post swallow after nectar thick liquid trials, and mild pooling in valleculae post swallow with honey thick liquid trials. Pt will not execute repeat swallow with cues from SLP. Due to confusion, pt was unable to follow any commands for compensatory strategies at time of MBS. Upper Esophageal Screen: Appears WFL with pt sitting upright in MBS chair throughout study. No impediment of bolus flow noted. Dysphagia Outcome Severity Scale: Level 3: Moderate dysphagia- Total assisstance, supervision or strategies. Two or more diet consistencies restricted  Penetration-Aspiration Scale (PAS): 8 - Material Enters the airway, passes below the vocal folds, and no effort is made to eject    Recommended Diet:  Solid consistency: Dysphagia Soft and Bite-Sized (Dysphagia III)  Liquid consistency: Moderately Thick (Honey)  Liquid administration via: Cup    Medication administration: Meds in puree(crushed as able)    Safe Swallow Protocol:  Supervision: 1:1  Compensatory Swallowing Strategies: Small bites/sips; Alternate solids and liquids;Eat/Feed slowly; No straws;Remain upright for 30-45 minutes after meals;Upright as possible for all oral intake          Recommendations/Treatment  Requires SLP Intervention: Yes                  Education: Images and recommendations were reviewed with pt,

## 2020-01-09 NOTE — PROGRESS NOTES
End of shift report given to Alirio Rodriguez RN at bedside. Patient alert and oriented. Bed in lowest position with wheels locked. Call light within reach. No further needs at this time.

## 2020-01-10 VITALS
HEIGHT: 66 IN | RESPIRATION RATE: 16 BRPM | TEMPERATURE: 98.5 F | OXYGEN SATURATION: 95 % | HEART RATE: 109 BPM | SYSTOLIC BLOOD PRESSURE: 100 MMHG | BODY MASS INDEX: 16.05 KG/M2 | WEIGHT: 99.87 LBS | DIASTOLIC BLOOD PRESSURE: 60 MMHG

## 2020-01-10 LAB
BLOOD CULTURE, ROUTINE: NORMAL
CULTURE, BLOOD 2: NORMAL
GLUCOSE BLD-MCNC: 116 MG/DL (ref 70–99)
GLUCOSE BLD-MCNC: 126 MG/DL (ref 70–99)
PERFORMED ON: ABNORMAL
PERFORMED ON: ABNORMAL

## 2020-01-10 PROCEDURE — 6360000002 HC RX W HCPCS: Performed by: NURSE PRACTITIONER

## 2020-01-10 PROCEDURE — 6370000000 HC RX 637 (ALT 250 FOR IP): Performed by: NURSE PRACTITIONER

## 2020-01-10 PROCEDURE — C9113 INJ PANTOPRAZOLE SODIUM, VIA: HCPCS | Performed by: NURSE PRACTITIONER

## 2020-01-10 PROCEDURE — 96372 THER/PROPH/DIAG INJ SC/IM: CPT

## 2020-01-10 PROCEDURE — 1200000000 HC SEMI PRIVATE

## 2020-01-10 PROCEDURE — 2580000003 HC RX 258: Performed by: EMERGENCY MEDICINE

## 2020-01-10 PROCEDURE — G0378 HOSPITAL OBSERVATION PER HR: HCPCS

## 2020-01-10 PROCEDURE — 96376 TX/PRO/DX INJ SAME DRUG ADON: CPT

## 2020-01-10 PROCEDURE — 6370000000 HC RX 637 (ALT 250 FOR IP): Performed by: INTERNAL MEDICINE

## 2020-01-10 RX ORDER — ATORVASTATIN CALCIUM 40 MG/1
40 TABLET, FILM COATED ORAL NIGHTLY
Qty: 30 TABLET | Refills: 3
Start: 2020-01-10

## 2020-01-10 RX ORDER — DRONABINOL 2.5 MG/1
2.5 CAPSULE ORAL 2 TIMES DAILY
Qty: 60 CAPSULE | Refills: 0 | Status: SHIPPED | OUTPATIENT
Start: 2020-01-10 | End: 2020-02-09

## 2020-01-10 RX ADMIN — Medication 1 CAPSULE: at 09:15

## 2020-01-10 RX ADMIN — HEPARIN SODIUM 5000 UNITS: 5000 INJECTION INTRAVENOUS; SUBCUTANEOUS at 06:30

## 2020-01-10 RX ADMIN — DRONABINOL 2.5 MG: 2.5 CAPSULE ORAL at 09:15

## 2020-01-10 RX ADMIN — Medication 10 ML: at 09:16

## 2020-01-10 RX ADMIN — SERTRALINE HYDROCHLORIDE 25 MG: 50 TABLET ORAL at 09:15

## 2020-01-10 RX ADMIN — PANTOPRAZOLE SODIUM 40 MG: 40 INJECTION, POWDER, FOR SOLUTION INTRAVENOUS at 09:15

## 2020-01-10 ASSESSMENT — PAIN SCALES - PAIN ASSESSMENT IN ADVANCED DEMENTIA (PAINAD)
BODYLANGUAGE: 0
CONSOLABILITY: 0
CONSOLABILITY: 0
TOTALSCORE: 0
BREATHING: 0
BREATHING: 0
CONSOLABILITY: 0
NEGVOCALIZATION: 0
FACIALEXPRESSION: 0
FACIALEXPRESSION: 0
TOTALSCORE: 0
NEGVOCALIZATION: 0
BODYLANGUAGE: 0
NEGVOCALIZATION: 0
BREATHING: 0
BODYLANGUAGE: 0
TOTALSCORE: 0
FACIALEXPRESSION: 0

## 2020-01-10 NOTE — DISCHARGE INSTR - COC
formulation 07/11/2006       Active Problems:  Patient Active Problem List   Diagnosis Code    Status post hip replacement Z96.649    DM [facility] E11.9    HTN (hypertension) I10    Osteoarthritis M19.90    Scoliosis M41.9    Elevated LFTs R94.5    HTN (hypertension), benign I10    Ganglion cyst M67.40    Essential hypertension I10    Type 2 diabetes mellitus without complication ([facility] I33.2    Hyperlipidemia E78.5    Primary osteoarthritis of left hip M16.12    DDD (degenerative disc disease), lumbar M51.36    History of total right hip replacement Z96.641    Subclinical hypothyroidism E03.9    Moderate malnutrition (HCC) E44.0    Acute encephalopathy G93.40    Vascular dementia with behavior disturbance (HCC) F01.51    Delirious R41.0    Dementia due to Alzheimer's disease ([facility] G30.9, F02.80    Dyslipidemia E78.5    Moderate protein-calorie malnutrition (HCC) E44.0    GI bleed K92.2    Anemia D64.9    Hyponatremia E87.1    ARF (acute renal failure) ([facility] N17.9    OSCAR (acute kidney injury) ([facility] N17.9    Acute cystitis with hematuria N30.01    Severe malnutrition (HCC) E43       Isolation/Infection:   Isolation          No Isolation        Patient Infection Status     None to display          Nurse Assessment:  Last Vital Signs: /63   Pulse 108   Temp 97.9 °F (36.6 °C) (Axillary)   Resp 14   Ht 5' 6\" (1.676 m)   Wt 99 lb 13.9 oz (45.3 kg)   SpO2 94%   BMI 16.12 kg/m²     Last documented pain score (0-10 scale): Pain Level: 0  Last Weight:   Wt Readings from Last 1 Encounters:   01/09/20 99 lb 13.9 oz (45.3 kg)     Mental Status:  disoriented and alert    IV Access:  - None    Nursing Mobility/ADLs:  Walking   Dependent  Transfer  Dependent  Bathing  Dependent  Dressing  Dependent  Toileting  Dependent  Feeding  Assisted  Med Admin  Assisted  Med Delivery   whole    Wound Care Documentation and Therapy:  Wound 01/07/20 Buttocks Left (Active)   Wound Pressure Stage  2 1/9/2020 12:20 AM   Dressing Status Clean;Dry; Intact 1/9/2020  8:38 AM   Dressing Changed Other (Comment) 1/9/2020 12:20 AM   Dressing/Treatment Foam 1/9/2020  8:38 AM   Wound Cleansed Other (Comment) 1/9/2020 12:20 AM   Wound Length (cm) 0.3 cm 1/9/2020 12:20 AM   Wound Width (cm) 0.2 cm 1/9/2020 12:20 AM   Wound Depth (cm) 0.1 cm 1/9/2020 12:20 AM   Wound Surface Area (cm^2) 0.06 cm^2 1/9/2020 12:20 AM   Wound Volume (cm^3) 0.01 cm^3 1/9/2020 12:20 AM   Drainage Amount None 1/8/2020  9:15 AM   Number of days: 3        Elimination:  Continence:   · Bowel: No  · Bladder: No  Urinary Catheter: None   Colostomy/Ileostomy/Ileal Conduit: No       Date of Last BM: 1/10/20    Intake/Output Summary (Last 24 hours) at 1/10/2020 1022  Last data filed at 1/10/2020 0522  Gross per 24 hour   Intake 0 ml   Output 0 ml   Net 0 ml     I/O last 3 completed shifts: In: 120 [P.O.:120]  Out: 0     Safety Concerns: At Risk for Falls    Impairments/Disabilities:      Stage II L buttocks    Nutrition Therapy:  Current Nutrition Therapy:   - Oral Diet:  General    Routes of Feeding: Oral  Liquids: Thin Liquids  Daily Fluid Restriction: no  Last Modified Barium Swallow with Video (Video Swallowing Test): 1/10/20    Treatments at the Time of Hospital Discharge:   Respiratory Treatments:   Oxygen Therapy:  is not on home oxygen therapy.   Ventilator:    - No ventilator support    Rehab Therapies: Speech/Language Therapy  Weight Bearing Status/Restrictions: No weight bearing restirctions  Other Medical Equipment (for information only, NOT a DME order):  hospital bed  Other Treatments:     Patient's personal belongings (please select all that are sent with patient):  Glasses    RN SIGNATURE:  Electronically signed by Yong Amato RN on 1/10/20 at 12:24 PM    CASE MANAGEMENT/SOCIAL WORK SECTION    Inpatient Status Date: 1/10/2020, OBS prior    Readmission Risk Assessment Score:  Readmission Risk              Risk of Unplanned

## 2020-01-10 NOTE — CONSULTS
Consult for possible stage 2 pressure injury on buttocks. No pressure injury noted. Patient has fungal rash (macropapular raised areas with satellite lesions)  with a small open area from incontinence associated skin damage. Patient was incontinent of urine upon evaluation. Right and left buttocks:          Recommend Clean skin with warm water or incontinent wipes after each episode. Apply Phytoplex fungal ointment bid and prn. Patient is being discharged to hospice today.

## 2020-01-10 NOTE — PROGRESS NOTES
4 Eyes Skin Assessment     The patient is being assess for   Skin breakdown    I agree that 2 RN's have performed a thorough Head to Toe Skin Assessment on the patient. ALL assessment sites listed below have been assessed. Areas assessed by both nurses:   []   Head, Face, and Ears   []   Shoulders, Back, and Chest, Abdomen  []   Arms, Elbows, and Hands   [x]   Coccyx, Sacrum, and Ischium  []   Legs, Feet, and Heels    Wound care RNNila at bedside. Skin breakdown is NOT pressure injury. Fungal rash per wound care. Antifungal cream applied. **SHARE this note so that the co-signing nurse is able to place an eSignature**    Co-signer eSignature: {Esignature:[id number]}    Does the Patient have Skin Breakdown?   Yes LDA WOUND CARE was Initiated documentation include the Marycarmen-wound, Wound Assessment, Measurements, Dressing Treatment, Drainage, and Color\",          Vinod Prevention initiated:  Yes   Wound Care Orders initiated:  Yes      [id number]  nurse consulted for Pressure Injury (Stage 3,4, Unstageable, DTI, NWPT, Complex wounds)and New or Established Ostomies:  No      Primary Nurse eSignature: Electronically signed by Jessica Blake RN on 1/10/20 at 12:26 PM

## 2020-01-10 NOTE — PROGRESS NOTES
Patient assessment complete and charted. VSS. AO to self & place. Bed locked and in lowest position. Non-skid socks in place. Call light within reach. Bed alarm on. Patient states no further needs at this time. Will continue to monitor.

## 2020-01-10 NOTE — PLAN OF CARE
Patient has pressure wounds, meplex applied and q2h turns initiated.
Problem: Falls - Risk of:  Goal: Will remain free from falls  Description  Will remain free from falls  Outcome: Ongoing     Problem: Risk for Impaired Skin Integrity  Goal: Tissue integrity - skin and mucous membranes  Description  Structural intactness and normal physiological function of skin and  mucous membranes.   1/8/2020 2312 by Dennys See RN  Outcome: Ongoing     Problem: Pain:  Goal: Control of acute pain  Description  Control of acute pain  Outcome: Ongoing     Problem: Nutrition  Goal: Optimal nutrition therapy  Outcome: Ongoing
Problem: Falls - Risk of:  Goal: Will remain free from falls  Description  Will remain free from falls  Outcome: Ongoing  Goal: Absence of physical injury  Description  Absence of physical injury  Outcome: Ongoing     Problem: Risk for Impaired Skin Integrity  Goal: Tissue integrity - skin and mucous membranes  Description  Structural intactness and normal physiological function of skin and  mucous membranes.   Outcome: Ongoing
Problem: Risk for Impaired Skin Integrity  Goal: Tissue integrity - skin and mucous membranes  Description  Structural intactness and normal physiological function of skin and  mucous membranes. 1/7/2020 1725 by Sera Perry RN  Outcome: Ongoing   Pt to have skin remain in tact and free of infection. Pt is repositioned every two hours, check and changed with moisture barrier added to skin.   Will continue to monitor
Problem: Risk for Impaired Skin Integrity  Goal: Tissue integrity - skin and mucous membranes  Description  Structural intactness and normal physiological function of skin and  mucous membranes. Outcome: Ongoing   Pt to maintain skin integrity. Pt to reposition every two hours with pillows with check and change. Zinc and mepilex applied. Pressure ulcer evaluated.   Will continue to monitor
care goals to comfort, pt needing to have a reliable source for nutrition and, if SLP rec's NPO, would they elect a PEG. Son was clear and he referenced pts Living Will, that no artificial nutrition is desired. Son also believes that pt would not desire to have her food textures modifiedor her liquids thickened. Hence, writer discussed option of having hospice support. Code status: DNRCC    Time Spent:     Total time spent face-to-face in education and discussion: 16 minutes.     Blanka Bach

## 2020-01-15 NOTE — DISCHARGE SUMMARY
evidence of filling defects on CT, c/w mucus plugging vs asp. Given reports of coughing after feeds and pills, aspiration is a  Possibility. Failed MBS. Family does not wish to change her diet. 3.UTI, ruleed out  Urine Cx pending, though her cx never seem to grow out. Will follow.        4. Vascular dementia  Appears to be worsening. Family had inquired about hospice services, but were not considering partnering. They have questions about her status, what is decline vs acute. We discussed the benefit of palliative consult. They are agreeable. Continue Marinol at the families request.    5. Moderate malnutrition, BMI 16.2 2/2 #4  Physical Exam Performed:     /60   Pulse 109   Temp 98.5 °F (36.9 °C) (Oral)   Resp 16   Ht 5' 6\" (1.676 m)   Wt 99 lb 13.9 oz (45.3 kg)   SpO2 95%   BMI 16.12 kg/m²       General appearance: thin frail female No apparent distress, appears stated age  [de-identified]:  Normal cephalic, atraumatic without obvious deformity. Pupils equal, round, and reactive to light. Extra ocular muscles intact. Conjunctivae/corneas clear. Neck: Supple, with full range of motion. No jugular venous distention. Trachea midline. Respiratory:  Normal respiratory effort. Clear to auscultation, bilaterally without Rales/Wheezes/Rhonchi. Cardiovascular:  Regular rate and rhythm with normal S1/S2 without murmurs, rubs or gallops. Abdomen: Soft, non-tender, non-distended with normal bowel sounds. Musculoskeletal:  No clubbing, cyanosis or edema bilaterally. Full range of motion without deformity. Skin: Skin color, texture, turgor normal.  No rashes or lesions. Neurologic:  Neurovascularly intact without any focal sensory/motor deficits. Cranial nerves: II-XII intact, grossly non-focal.  Psychiatric:  Alert and oriented to self, thought content appropriate, normal insight  Capillary Refill: Brisk,< 3 seconds   Peripheral Pulses: +2 palpable, equal bilaterally       Labs:  For convenience and continuity at follow-up the following most recent labs are provided:      CBC:    Lab Results   Component Value Date    WBC 8.7 01/09/2020    HGB 8.4 01/09/2020    HCT 26.9 01/09/2020     01/09/2020       Renal:    Lab Results   Component Value Date     01/09/2020    K 4.1 01/09/2020    K 4.2 12/08/2019     01/09/2020    CO2 20 01/09/2020    BUN 23 01/09/2020    CREATININE 1.4 01/09/2020    CALCIUM 7.9 01/09/2020    PHOS 2.8 12/07/2019         Significant Diagnostic Studies    Radiology:   FL MODIFIED BARIUM SWALLOW W VIDEO   Final Result   Aspiration with thin liquid. Laryngeal penetration with nectar consistency. Please see separate speech pathology report for full discussion of findings   and recommendations. XR CHEST 1 VW   Final Result   1. No acute abnormality. CT CHEST WO CONTRAST   Final Result   Mild bronchiectasis within the lungs bilaterally. There filling defects   identified within the left lower lobe and lingular airways, which may   represent mucous plugging versus aspiration         XR CHEST PORTABLE   Final Result   Left lower lobe airspace opacity could represent scarring, atelectasis or   pneumonia                Consults:     IP CONSULT TO HOSPITALIST  IP CONSULT TO DIETITIAN  IP CONSULT TO HOSPICE  IP CONSULT TO DIETITIAN  IP CONSULT TO PALLIATIVE CARE    Disposition:  ECF     Condition at Discharge: Stable    Discharge Instructions/Follow-up:  No future appointments. Code Status:  [facility]    Activity: activity as tolerated    Diet: per families wishes      Discharge Medications:     Discharge Medication List as of 1/10/2020 12:24 PM           Details   dronabinol (MARINOL) 2.5 MG capsule Take 1 capsule by mouth 2 times daily for 30 days. , Disp-60 capsule, R-0Print              Details   atorvastatin (LIPITOR) 40 MG tablet Take 1 tablet by mouth nightly, Disp-30 tablet, R-3NO PRINT              Details   pantoprazole (PROTONIX) 40 MG tablet Take 40 mg by mouth dailyHistorical Med      mirtazapine (REMERON) 15 MG tablet Take 7.5 mg by mouth nightlyHistorical Med      ondansetron (ZOFRAN) 4 MG tablet Take 4 mg by mouth every 8 hours as needed for Nausea or VomitingHistorical Med      ACIDOPHILUS LACTOBACILLUS PO Take 1 tablet by mouth daily Chewable. Historical Med      sertraline (ZOLOFT) 25 MG tablet Take 25 mg by mouth dailyHistorical Med      lidocaine 4 % external patch Place 1 patch onto the skin daily, Transdermal, DAILY Starting Sun 8/25/2019, DC to SNF      Multiple Vitamins-Minerals (VISION VITAMINS PO) Take 1 tablet by mouth dailyHistorical Med      insulin glargine (TOUJEO SOLOSTAR) 300 UNIT/ML injection pen Inject 10 Units into the skin daily, Disp-2 pen, R-3Normal             Time Spent on discharge is more than 1 hour in the examination, evaluation, counseling and review of medications and discharge plan. Signed:    REI Llyod - CNP   1/15/2020      Thank you Libby dukes MD for the opportunity to be involved in this patient's care. If you have any questions or concerns please feel free to contact me at [phone].

## 2020-01-16 ENCOUNTER — CARE COORDINATION (OUTPATIENT)
Dept: CASE MANAGEMENT | Age: 85
End: 2020-01-16

## 2020-02-19 NOTE — PROGRESS NOTES
ABBY received notice that peer to peer is being offered for SNF placement.  ABBY called [facility] [phone] to set up peer to peer.  Dr. Burnett from [facility] to call Dr. Caldwell tomorrow, 2/20/2020 at 1:00 PM for peer to peer.   Historical Provider, MD   insulin glargine (TOUJEO SOLOSTAR) 300 UNIT/ML injection pen Inject 10 Units into the skin daily  Patient taking differently: Inject 12 Units into the skin daily  6/27/19  Yes REI Rashid CNP   Insulin Pen Needle (PEN NEEDLES 3/16\") 31G X 5 MM MISC 1 each by Does not apply route daily 6/27/19  Yes REI Rashid CNP   blood glucose test strips (CONTOUR TEST) strip USE ONE STRIP TO TEST DAILY.  Dx E11.9 6/26/19  Yes Daksha Gomez MD   amLODIPine (NORVASC) 10 MG tablet Take 1 tablet by mouth daily 5/16/19  Yes Daksha Gomez MD   oxybutynin (DITROPAN) 5 MG tablet TAKE ONE TABLET BY MOUTH Two TIMES A DAY 5/8/19  Yes REI Rashid CNP   metoprolol tartrate (LOPRESSOR) 50 MG tablet Take 1 tablet by mouth 2 times daily 5/8/19  Yes REI Rashid CNP   canagliflozin [facility]) 100 MG TABS tablet Take 1 tablet by mouth daily 5/8/19  Yes REI Rashid CNP   atorvastatin (LIPITOR) 40 MG tablet TAKE ONE TABLET BY MOUTH DAILY 5/8/19  Yes REI Rashid CNP   aspirin 81 MG chewable tablet Take 1 tablet by mouth daily 3/28/19  Yes REI Michel CNP   Lift Chair MISC by Does not apply route Power left chair 3/19/19  Yes MD TERENCE RobertsonOGER LANCETS ULTRATHIN 30G MISC USE ONE LANCET TO TEST BLOOD SUGAR LEVELS ONCE DAILY, Dx: E11.9 8/1/18  Yes Daksha Gomez MD   MICROLET LANCETS [facility] [facility] 1 each by Does not apply route daily Dx E11.92 6/26/17  Yes Daksha Gomez MD   Glucos-Chond-Hyal Ac-Ca Fructo ([address]) Take by mouth   Yes Historical Provider, MD   Coenzyme Q10 (CO Q 10 PO) Take 1 tablet by mouth daily   Yes Historical Provider, MD PENA LANCETS ULTRATHIN 30G MISC USE TO TEST BLOOD SUGAR LEVELS ONCE DAILY  Dx E11.9 2/26/16  Yes Leann Jimenez MD   fluconazole (DIFLUCAN) 100 MG tablet Take 2 tablets on day 1 and then 1 tab daily thereafter 7/22/19   REI Landaverde CNP   triamcinolone (KENALOG) 0.1 %

## 2020-03-25 PROBLEM — N17.9 ARF (ACUTE RENAL FAILURE) (HCC): Status: RESOLVED | Noted: 2019-12-05 | Resolved: 2020-03-24

## 2020-11-03 PROBLEM — I10 HTN (HYPERTENSION), BENIGN: Status: RESOLVED | Noted: 2020-11-03 | Resolved: 2020-11-03

## 2024-02-15 NOTE — PLAN OF CARE
Problem: Falls - Risk of:  Goal: Will remain free from falls  Description  Will remain free from falls  Outcome: Ongoing  Pt. Has non-skid socks on, call light in reach, side rails up x-2, bed alarm on (posey bed alarm in place), patient instructed to use call light with concerns and questions. Pt. Alert and oriented x-4. Pt. Able to turn self side to side in bed as tolerated. Pt. X-1 assist with front-wheeled rolling walker. Will continue to monitor.     Goal: Absence of physical injury  Description  Absence of physical injury  Outcome: Ongoing 15-Feb-2024 22:15

## (undated) DEVICE — ENDO CARRY-ON PROCEDURE KIT INCLUDES SUCTION TUBING, LUBRICANT, GAUZE, BIOHAZARD STICKER, TRANSPORT PAD AND INTERCEPT BEDSIDE KIT.: Brand: ENDO CARRY-ON PROCEDURE KIT

## (undated) DEVICE — CONMED SCOPE SAVER BITE BLOCK, 20X27 MM: Brand: SCOPE SAVER

## (undated) DEVICE — CANNULA NSL AD L7FT DIV O2 CO2 W/ M LUERLOCK TRMPT CONN

## (undated) DEVICE — Z DISCONTINUED USE 2276105 GOWN PROTCT UNIV CHST W28IN L49IN SL 24IN BLU SPUNBOND FLM